# Patient Record
Sex: MALE | Race: BLACK OR AFRICAN AMERICAN | Employment: UNEMPLOYED | RURAL
[De-identification: names, ages, dates, MRNs, and addresses within clinical notes are randomized per-mention and may not be internally consistent; named-entity substitution may affect disease eponyms.]

---

## 2019-10-01 ENCOUNTER — OFFICE VISIT (OUTPATIENT)
Dept: INTERNAL MEDICINE CLINIC | Age: 49
End: 2019-10-01

## 2019-10-01 VITALS
TEMPERATURE: 98.2 F | HEIGHT: 71 IN | OXYGEN SATURATION: 98 % | DIASTOLIC BLOOD PRESSURE: 87 MMHG | SYSTOLIC BLOOD PRESSURE: 124 MMHG | RESPIRATION RATE: 16 BRPM | WEIGHT: 244 LBS | HEART RATE: 83 BPM | BODY MASS INDEX: 34.16 KG/M2

## 2019-10-01 DIAGNOSIS — I10 ESSENTIAL HYPERTENSION: Primary | ICD-10-CM

## 2019-10-01 DIAGNOSIS — F32.1 MODERATE MAJOR DEPRESSION (HCC): ICD-10-CM

## 2019-10-01 DIAGNOSIS — E78.00 HYPERCHOLESTEREMIA: ICD-10-CM

## 2019-10-01 DIAGNOSIS — M10.00 IDIOPATHIC GOUT, UNSPECIFIED CHRONICITY, UNSPECIFIED SITE: ICD-10-CM

## 2019-10-01 DIAGNOSIS — L20.82 FLEXURAL ECZEMA: ICD-10-CM

## 2019-10-01 PROBLEM — M10.9 GOUT: Status: ACTIVE | Noted: 2019-10-01

## 2019-10-01 PROBLEM — L30.9 ECZEMA: Status: ACTIVE | Noted: 2019-10-01

## 2019-10-01 RX ORDER — ALLOPURINOL 300 MG/1
300 TABLET ORAL DAILY
Qty: 30 TAB | Refills: 5 | Status: SHIPPED | OUTPATIENT
Start: 2019-10-01 | End: 2020-01-13 | Stop reason: SDUPTHER

## 2019-10-01 RX ORDER — SILDENAFIL 100 MG/1
100 TABLET, FILM COATED ORAL AS NEEDED
Qty: 3 TAB | Refills: 5 | Status: SHIPPED | OUTPATIENT
Start: 2019-10-01 | End: 2020-04-14 | Stop reason: SDUPTHER

## 2019-10-01 RX ORDER — ALLOPURINOL 300 MG/1
TABLET ORAL DAILY
COMMUNITY
End: 2019-10-01 | Stop reason: SDUPTHER

## 2019-10-01 RX ORDER — LISINOPRIL 10 MG/1
10 TABLET ORAL DAILY
Qty: 30 TAB | Refills: 5 | Status: SHIPPED | OUTPATIENT
Start: 2019-10-01 | End: 2020-03-03 | Stop reason: SDUPTHER

## 2019-10-01 RX ORDER — PRAVASTATIN SODIUM 40 MG/1
40 TABLET ORAL
Qty: 30 TAB | Refills: 5 | Status: SHIPPED | OUTPATIENT
Start: 2019-10-01 | End: 2020-03-03 | Stop reason: SDUPTHER

## 2019-10-01 RX ORDER — COLCHICINE 0.6 MG/1
0.6 TABLET ORAL DAILY
Qty: 30 TAB | Refills: 5 | Status: SHIPPED | OUTPATIENT
Start: 2019-10-01 | End: 2020-01-13 | Stop reason: SDUPTHER

## 2019-10-01 RX ORDER — TRIAMCINOLONE ACETONIDE 1 MG/G
CREAM TOPICAL 2 TIMES DAILY
Qty: 45 G | Refills: 2 | Status: SHIPPED | OUTPATIENT
Start: 2019-10-01 | End: 2020-03-10

## 2019-10-01 RX ORDER — ROSUVASTATIN CALCIUM 40 MG/1
40 TABLET, COATED ORAL
COMMUNITY
End: 2019-10-01 | Stop reason: SDUPTHER

## 2019-10-01 RX ORDER — TRIAMCINOLONE ACETONIDE 5 MG/G
CREAM TOPICAL 2 TIMES DAILY
COMMUNITY
End: 2019-10-01 | Stop reason: SDUPTHER

## 2019-10-01 RX ORDER — SERTRALINE HYDROCHLORIDE 50 MG/1
50 TABLET, FILM COATED ORAL DAILY
Qty: 30 TAB | Refills: 5 | Status: SHIPPED | OUTPATIENT
Start: 2019-10-01 | End: 2020-03-03 | Stop reason: SDUPTHER

## 2019-10-01 RX ORDER — LISINOPRIL 10 MG/1
TABLET ORAL DAILY
COMMUNITY
End: 2019-10-01 | Stop reason: SDUPTHER

## 2019-10-01 RX ORDER — COLCHICINE 0.6 MG/1
0.6 TABLET ORAL 2 TIMES DAILY
COMMUNITY
End: 2019-10-01 | Stop reason: SDUPTHER

## 2019-10-01 NOTE — PROGRESS NOTES
New patient to establish care - psychologist Dr Joe Leonardo [phone] - wants patient to start Zoloft and Wellbutrin SR - hypertension and cholesterol  Teto Dunn LPN  [id number]/0/9326  91:09 AM  128 Nubia Mitchell LPN  39/6/2283  18:64 AM

## 2019-10-01 NOTE — PROGRESS NOTES
Subjective:     Naveen Foley is a 53 y.o. male who presents for follow up of hypertension and hyperlipidemia. New concerns: seeing psych for depression and PTSD, Zahra Camacho. Never been on AD before. Patient is new to this clinic. Comes in to establish care. Previous care was with . Reason for the change is: medicaid ins.  C/o ED  Phq9=19    Current Outpatient Medications   Medication Sig Dispense Refill    allopurinol (ZYLOPRIM) 300 mg tablet Take  by mouth daily.  lisinopril (PRINIVIL, ZESTRIL) 10 mg tablet Take  by mouth daily.  colchicine (COLCRYS) 0.6 mg tablet Take 0.6 mg by mouth two (2) times a day.  rosuvastatin (CRESTOR) 40 mg tablet Take 40 mg by mouth nightly.  triamcinolone (ARISTOCORT) 0.5 % topical cream Apply  to affected area two (2) times a day. use thin layer       No Known Allergies    Diet and Lifestyle: smoker 0.5 pack per day    Cardiovascular ROS: taking medications as instructed, no medication side effects noted, no TIA's, no chest pain on exertion, no dyspnea on exertion, noting swelling of ankles. Hx gout bothers him occ    Review of Systems, additional:  Pertinent items are noted in HPI. Patient Active Problem List    Diagnosis Date Noted    Essential hypertension 10/01/2019    Hypercholesteremia 10/01/2019    Eczema 10/01/2019    Gout 10/01/2019    Moderate major depression ([address] 10/01/2019     Past Medical History:   Diagnosis Date    Depression     Hypercholesterolemia     Hypertension     Psychotic disorder ([facility] [address]      No past surgical history on file. No family history on file. Social History     Tobacco Use    Smoking status: Current Every Day Smoker     Packs/day: 0.50     Types: Cigarettes    Smokeless tobacco: Never Used   Substance Use Topics    Alcohol use: Not Currently     Frequency: Never                 Objective:     Physical exam significant for the following:      WNL    Visit Vitals  /87 (BP 1 Location: Right arm, BP Patient Position: At rest)   Pulse 83   Temp 98.2 °F (36.8 °C) (Oral)   Resp 16   Ht 5' 11\" (1.803 m)   Wt 244 lb (110.7 kg)   SpO2 98%   BMI 34.03 kg/m²     Appearance: alert, well appearing, and in no distress. General exam: CVS exam BP noted to be well controlled today in office, S1, S2 normal, no gallop, no murmur, chest clear, no JVD, no HSM, no edema. .   Assessment/Plan:     hypertension well controlled, stable, hyperlipidemia stable. ICD-10-CM ICD-9-CM    1. Essential hypertension Z40 237.6 METABOLIC PANEL, COMPREHENSIVE      CBC W/O DIFF   2. Hypercholesteremia E78.00 272.0 LIPID PANEL   3. Flexural eczema L20.82 691.8    4. Idiopathic gout, unspecified chronicity, unspecified site M10.00 274.9    5. Moderate major depression (HCC) F32.1 296.22 TSH 3RD GENERATION     Orders Placed This Encounter    LIPID PANEL     Standing Status:   Future     Standing Expiration Date:   4/2/2020    TSH 3RD GENERATION     Standing Status:   Future     Standing Expiration Date:   4/7/0295    METABOLIC PANEL, COMPREHENSIVE     Standing Status:   Future     Standing Expiration Date:   4/2/2020    CBC W/O DIFF     Standing Status:   Future     Standing Expiration Date:   4/2/2020    DISCONTD: allopurinol (ZYLOPRIM) 300 mg tablet     Sig: Take  by mouth daily.  DISCONTD: lisinopril (PRINIVIL, ZESTRIL) 10 mg tablet     Sig: Take  by mouth daily.  DISCONTD: colchicine (COLCRYS) 0.6 mg tablet     Sig: Take 0.6 mg by mouth two (2) times a day.  DISCONTD: rosuvastatin (CRESTOR) 40 mg tablet     Sig: Take 40 mg by mouth nightly.  DISCONTD: triamcinolone (ARISTOCORT) 0.5 % topical cream     Sig: Apply  to affected area two (2) times a day. use thin layer    sertraline (ZOLOFT) 50 mg tablet     Sig: Take 1 Tab by mouth daily. Start half tablet daily, usually in the evening, every few days increase as tolerated,mood.      Dispense:  30 Tab     Refill:  5    triamcinolone acetonide (KENALOG) 0.1 % topical cream     Sig: Apply  to affected area two (2) times a day. use thin layer     Dispense:  45 g     Refill:  2    colchicine (COLCRYS) 0.6 mg tablet     Sig: Take 1 Tab by mouth daily. Dispense:  30 Tab     Refill:  5    allopurinol (ZYLOPRIM) 300 mg tablet     Sig: Take 1 Tab by mouth daily. Dispense:  30 Tab     Refill:  5    lisinopril (PRINIVIL, ZESTRIL) 10 mg tablet     Sig: Take 1 Tab by mouth daily. Dispense:  30 Tab     Refill:  5    pravastatin (PRAVACHOL) 40 mg tablet     Sig: Take 1 Tab by mouth nightly. Dispense:  30 Tab     Refill:  5    sildenafil citrate (VIAGRA) 100 mg tablet     Sig: Take 1 Tab by mouth as needed (sex). Dispense:  3 Tab     Refill:  5     Discussed possible side affects, precautions, and drug interactions and possible benefits of the medication(s). See patient instructions, went over them personally with the patient. Emphasized compliance. Questions answered. Patient states that they understand the plan of action and will call if there are any issues or misunderstandings. Follow-up and Dispositions    · Return in about 6 weeks (around 11/12/2019) for routine follow up.

## 2019-10-29 ENCOUNTER — TELEPHONE (OUTPATIENT)
Dept: INTERNAL MEDICINE CLINIC | Age: 49
End: 2019-10-29

## 2019-10-29 NOTE — TELEPHONE ENCOUNTER
10/29/19 Attempted PA  For Colchicine 0.6 mg tablet with PA rep Purnima FUENTES.she state tablets not on formulary,capsules on formulary without PA. [facility] pharmacy called spoke with Mirta Bunch state he will convert to capsules. Please note Thanks

## 2019-11-22 ENCOUNTER — OFFICE VISIT (OUTPATIENT)
Dept: INTERNAL MEDICINE CLINIC | Age: 49
End: 2019-11-22

## 2019-11-22 VITALS
RESPIRATION RATE: 16 BRPM | HEIGHT: 71 IN | OXYGEN SATURATION: 97 % | HEART RATE: 94 BPM | BODY MASS INDEX: 33.88 KG/M2 | DIASTOLIC BLOOD PRESSURE: 82 MMHG | SYSTOLIC BLOOD PRESSURE: 122 MMHG | WEIGHT: 242 LBS | TEMPERATURE: 98.2 F

## 2019-11-22 DIAGNOSIS — R07.9 CHEST PAIN, UNSPECIFIED TYPE: ICD-10-CM

## 2019-11-22 DIAGNOSIS — I10 ESSENTIAL HYPERTENSION: ICD-10-CM

## 2019-11-22 DIAGNOSIS — M10.00 IDIOPATHIC GOUT, UNSPECIFIED CHRONICITY, UNSPECIFIED SITE: ICD-10-CM

## 2019-11-22 DIAGNOSIS — F32.1 MODERATE MAJOR DEPRESSION (HCC): Primary | ICD-10-CM

## 2019-11-22 RX ORDER — BUPROPION HYDROCHLORIDE 100 MG/1
100 TABLET ORAL 3 TIMES DAILY
Qty: 90 TAB | Refills: 2 | Status: SHIPPED | OUTPATIENT
Start: 2019-11-22 | End: 2020-02-10

## 2019-11-22 RX ORDER — GUAIFENESIN 100 MG/5ML
81 LIQUID (ML) ORAL DAILY
COMMUNITY
End: 2022-10-20 | Stop reason: ALTCHOICE

## 2019-11-22 RX ORDER — NAPROXEN 500 MG/1
500 TABLET ORAL 2 TIMES DAILY WITH MEALS
Qty: 60 TAB | Refills: 0 | Status: SHIPPED | OUTPATIENT
Start: 2019-11-22 | End: 2019-12-12 | Stop reason: ALTCHOICE

## 2019-11-22 RX ORDER — ZINC GLUCONATE 10 MG
LOZENGE ORAL
COMMUNITY

## 2019-11-22 NOTE — LETTER
[facility] introduces Touch[employer] patient portal. Now you can access parts of your medical record, email your doctor's office, and request medication refills online. 1. In your internet browser, go to www."[employer]" 
2. Click on the First Time User? Click Here link in the Sign In box. You will see the New Member Sign Up page. 3. Enter your Touch[employer] Access Code exactly as it appears below. You will not need to use this code after youve completed the sign-up process. If you do not sign up before the expiration date, you must request a new code. · TouchTen Access Code:[email or web address] · Expires: 1/6/2020 10:46 AM 
 
4. Enter the last four digits of your Social Security Number (xxxx) and Date of Birth (mm/dd/yyyy) as indicated and click Submit. You will be taken to the next sign-up page. 5. Create a TouchTen ID. This will be your TouchTen login ID and cannot be changed, so think of one that is secure and easy to remember. 6. Create a Touch[employer] password. You can change your password at any time. 7. Enter your Password Reset Question and Answer. This can be used at a later time if you forget your password. 8. Enter your e-mail address. You will receive e-mail notification when new information is available in [address] 9. Click Sign Up. You can now view and download portions of your medical record. 10. Click the Download Summary menu link to download a portable copy of your medical information. If you have questions, please visit the Frequently Asked Questions section of the Touch[employer] website. Remember, TouchTen is NOT to be used for urgent needs. For medical emergencies, dial 911. Now available from your iPhone and Android!

## 2019-11-22 NOTE — PROGRESS NOTES
HISTORY OF PRESENT ILLNESS  Harry Cameron is a 51 y.o. male. Panic Attack   The history is provided by the patient. This is a new problem. Episode onset: 2 mo. The problem occurs daily. The problem has been gradually worsening (lately). Associated symptoms include chest pain and shortness of breath. Associated symptoms comments: Numbness left arm and palpitations, last 10 to 15 min. The symptoms are relieved by relaxation. Treatments tried: went to ER with tingling. Agree with comments, see chief complaint. He wants to do some cardiac testing to make sure that that is not the problem. His ER physician thinks it is panic attacks. Aside from having panic attacks, he has been feeling pretty depressed. See PHQ 9  Labs from today were reviewed  no, labs done previously were reviewed  yes, Labs done in ER were reviewed  yes, Additional labs are ordered  yes,      Current Outpatient Medications   Medication Sig Dispense Refill    magnesium 250 mg tab Take  by mouth.  aspirin 81 mg chewable tablet Take 81 mg by mouth daily.  sertraline (ZOLOFT) 50 mg tablet Take 1 Tab by mouth daily. Start half tablet daily, usually in the evening, every few days increase as tolerated,mood. 30 Tab 5    triamcinolone acetonide (KENALOG) 0.1 % topical cream Apply  to affected area two (2) times a day. use thin layer 45 g 2    colchicine (COLCRYS) 0.6 mg tablet Take 1 Tab by mouth daily. 30 Tab 5    allopurinol (ZYLOPRIM) 300 mg tablet Take 1 Tab by mouth daily. 30 Tab 5    lisinopril (PRINIVIL, ZESTRIL) 10 mg tablet Take 1 Tab by mouth daily. 30 Tab 5    pravastatin (PRAVACHOL) 40 mg tablet Take 1 Tab by mouth nightly. 30 Tab 5    sildenafil citrate (VIAGRA) 100 mg tablet Take 1 Tab by mouth as needed (sex). 3 Tab 5       Review of Systems   Respiratory: Positive for shortness of breath. Cardiovascular: Positive for chest pain. Musculoskeletal: Positive for joint pain.    Psychiatric/Behavioral: Positive for depression and suicidal ideas.      Social History     Socioeconomic History    Marital status:      Spouse name: Not on file    Number of children: Not on file    Years of education: Not on file    Highest education level: Not on file   Occupational History    Occupation: unemployed   Social Needs    Financial resource strain: Not on file    Food insecurity:     Worry: Not on file     Inability: Not on file   [employer] needs:     Medical: Not on file     Non-medical: Not on file   Tobacco Use    Smoking status: Current Every Day Smoker     Packs/day: 1.00     Types: Cigarettes    Smokeless tobacco: Never Used   Substance and Sexual Activity    Alcohol use: Not Currently     Alcohol/week: 1.0 standard drinks     Types: 1 Cans of beer per week     Frequency: Never     Comment: occ    Drug use: Never    Sexual activity: Yes     Partners: Female   Lifestyle    Physical activity:     Days per week: Not on file     Minutes per session: Not on file    Stress: Not on file   Relationships    Social connections:     Talks on phone: Not on file     Gets together: Not on file     Attends Taoist service: Not on file     Active member of club or organization: Not on file     Attends meetings of clubs or organizations: Not on file     Relationship status: Not on file    Intimate partner violence:     Fear of current or ex partner: Not on file     Emotionally abused: Not on file     Physically abused: Not on file     Forced sexual activity: Not on file   Other Topics Concern    Not on file   Social History Narrative    Not on file         Physical Exam  Visit Vitals  /82 (BP 1 Location: Left arm, BP Patient Position: Sitting)   Pulse 94   Temp 98.2 °F (36.8 °C) (Oral)   Resp 16   Ht 5' 11\" (1.803 m)   Wt 242 lb (109.8 kg)   SpO2 97%   BMI 33.75 kg/m²     WD WN male NAD  Heart RRR without murmers clicks or rubs  Lungs CTA  Abdo soft nontender  Ext no edema    ASSESSMENT and PLAN  Encounter Diagnoses Name Primary?  Moderate major depression (HCC) Yes    Chest pain, unspecified type     Idiopathic gout, unspecified chronicity, unspecified site     Essential hypertension      Orders Placed This Encounter    AMB SUPPLY ORDER    magnesium 250 mg tab    aspirin 81 mg chewable tablet    buPROPion (WELLBUTRIN) 100 mg tablet    naproxen (NAPROSYN) 500 mg tablet     Discussed possible side affects, precautions, and drug interactions and possible benefits of the medication(s). The patient was counseled on the dangers of tobacco use, and was advised to quit. Reviewed strategies to maximize success, including pharmacotherapy (wellbutrin). We will see if Wellbutrin can help also send in the counseling. Stress test ordered. Hypertension stable  Follow-up and Dispositions    · Return in about 6 weeks (around 1/3/2020) for routine follow up.

## 2019-11-22 NOTE — PROGRESS NOTES
Chief Complaint   Patient presents with    Panic Attack     [facility] - ER x1 week follow up    tingling and numbness L/arm down to fingers and L/side of chest numb     I have reviewed the patient's medical history in detail and updated the computerized patient record. Health Maintenance reviewed. 1. Have you been to the ER, urgent care clinic since your last visit? Hospitalized since your last visit? yes    2. Have you seen or consulted any other health care providers outside of the [address] [place] [address] since your last visit? Include any pap smears or colon screening. [facility] ER      Encouraged pt to discuss pt's wishes with spouse/partner/family and bring them in the next appt to follow thru with the Advanced Directive    Fall Risk Assessment, last 12 mths 11/22/2019   Able to walk? Yes   Fall in past 12 months? No       3 most recent PHQ Screens 11/22/2019   Little interest or pleasure in doing things Nearly every day   Feeling down, depressed, irritable, or hopeless Nearly every day   Total Score PHQ 2 6   Trouble falling or staying asleep, or sleeping too much -   Feeling tired or having little energy -   Poor appetite, weight loss, or overeating -   Feeling bad about yourself - or that you are a failure or have let yourself or your family down -   Trouble concentrating on things such as school, work, reading, or watching TV -   Moving or speaking so slowly that other people could have noticed; or the opposite being so fidgety that others notice -   Thoughts of being better off dead, or hurting yourself in some way -   PHQ 9 Score -   How difficult have these problems made it for you to do your work, take care of your home and get along with others -       Abuse Screening Questionnaire 11/22/2019   Do you ever feel afraid of your partner? N   Are you in a relationship with someone who physically or mentally threatens you? N   Is it safe for you to go home?  Y       ADL Assessment 11/22/2019   Feeding yourself No Help Needed   Getting from bed to chair No Help Needed   Getting dressed No Help Needed   Bathing or showering No Help Needed   Walk across the room (includes cane/walker) No Help Needed   Using the telphone No Help Needed   Taking your medications No Help Needed   Preparing meals No Help Needed   Managing money (expenses/bills) No Help Needed   Moderately strenuous housework (laundry) No Help Needed   Shopping for personal items (toiletries/medicines) No Help Needed   Shopping for groceries No Help Needed   Driving No Help Needed   Climbing a flight of stairs No Help Needed   Getting to places beyond walking distances No Help Needed

## 2019-11-22 NOTE — PATIENT INSTRUCTIONS
Recovering From Depression: Care Instructions  Your Care Instructions    Taking good care of yourself is important as you recover from depression. In time, your symptoms will fade as your treatment takes hold. Do not give up. Instead, focus your energy on getting better. Your mood will improve. It just takes some time. Focus on things that can help you feel better, such as being with friends and family, eating well, and getting enough rest. But take things slowly. Do not do too much too soon. You will begin to feel better gradually. Follow-up care is a key part of your treatment and safety. Be sure to make and go to all appointments, and call your doctor if you are having problems. It's also a good idea to know your test results and keep a list of the medicines you take. How can you care for yourself at home? Be realistic  · If you have a large task to do, break it up into smaller steps you can handle, and just do what you can. · You may want to put off important decisions until your depression has lifted. If you have plans that will have a major impact on your life, such as marriage, divorce, or a job change, try to wait a bit. Talk it over with friends and loved ones who can help you look at the overall picture first.  · Reaching out to people for help is important. Do not isolate yourself. Let your family and friends help you. Find someone you can trust and confide in, and talk to that person. · Be patient, and be kind to yourself. Remember that depression is not your fault and is not something you can overcome with willpower alone. Treatment is necessary for depression, just like for any other illness. Feeling better takes time, and your mood will improve little by little. Stay active  · Stay busy and get outside. Take a walk, or try some other light exercise. · Talk with your doctor about an exercise program. Exercise can help with mild depression. · Go to a movie or concert.  Take part in a Sabian[employer] activity or other social gathering. Go to a "[employer] game. · Ask a friend to have dinner with you. Take care of yourself  · Eat a balanced diet with plenty of fresh fruits and vegetables, whole grains, and lean protein. If you have lost your appetite, eat small snacks rather than large meals. · Avoid drinking alcohol or using illegal drugs. Do not take medicines that have not been prescribed for you. They may interfere with medicines you may be taking for depression, or they may make your depression worse. · Take your medicines exactly as they are prescribed. You may start to feel better within 1 to 3 weeks of taking antidepressant medicine. But it can take as many as 6 to 8 weeks to see more improvement. If you have questions or concerns about your medicines, or if you do not notice any improvement by 3 weeks, talk to your doctor. · If you have any side effects from your medicine, tell your doctor. Antidepressants can make you feel tired, dizzy, or nervous. Some people have dry mouth, constipation, headaches, sexual problems, or diarrhea. Many of these side effects are mild and will go away on their own after you have been taking the medicine for a few weeks. Some may last longer. Talk to your doctor if side effects are bothering you too much. You might be able to try a different medicine. · Get enough sleep. If you have problems sleeping:  ? Go to bed at the same time every night, and get up at the same time every morning. ? Keep your bedroom dark and quiet. ? Do not exercise after 5:00 p.m.  ? Avoid drinks with caffeine after 5:00 p.m. · Avoid sleeping pills unless they are prescribed by the doctor treating your depression. Sleeping pills may make you groggy during the day, and they may interact with other medicine you are taking. · If you have any other illnesses, such as diabetes, heart disease, or high blood pressure, make sure to continue with your treatment.  Tell your doctor about all of the medicines you take, including those with or without a prescription. · Keep the numbers for these national suicide hotlines: 8-192-686-TALK (8-568.567.9445) and 6-682-TSOPHEP (3-225.245.4978). If you or someone you know talks about suicide or feeling hopeless, get help right away. When should you call for help? Call 911 anytime you think you may need emergency care. For example, call if:    · You feel like hurting yourself or someone else.     · Someone you know has depression and is about to attempt or is attempting suicide.   [facility] your doctor now or seek immediate medical care if:    · You hear voices.     · Someone you know has depression and:  ? Starts to give away his or her possessions. ? Uses illegal drugs or drinks alcohol heavily. ? Talks or writes about death, including writing suicide notes or talking about guns, knives, or pills. ? Starts to spend a lot of time alone. ? Acts very aggressively or suddenly appears calm.    Watch closely for changes in your health, and be sure to contact your doctor if:    · You do not get better as expected. Where can you learn more? Go to http://octavio-moraima.info/. Enter Y121 in the search box to learn more about \"Recovering From Depression: Care Instructions. \"  Current as of: May 28, 2019  Content Version: 12.2  © 8632-2422 [employer]. Care instructions adapted under license by [employer] (which disclaims liability or warranty for this information). If you have questions about a medical condition or this instruction, always ask your healthcare professional. [place] [address] any warranty or liability for your use of this information. Depression Treatment: Care Instructions  Your Care Instructions    Depression is a condition that affects the way you feel, think, and act.  It causes symptoms such as low energy, loss of interest in daily activities, and sadness or grouchiness that goes on for a long time. Depression is very common and affects men and women of all ages. Depression is a medical illness caused by changes in the natural chemicals in your brain. It is not a character flaw, and it does not mean that you are a bad or weak person. It does not mean that you are going crazy. It is important to know that depression can be treated. Medicines, counseling, and self-care can all help. Many people do not get help because they are embarrassed or think that they will get over the depression on their own. But some people do not get better without treatment. Follow-up care is a key part of your treatment and safety. Be sure to make and go to all appointments, and call your doctor if you are having problems. It's also a good idea to know your test results and keep a list of the medicines you take. How can you care for yourself at home? Learn about antidepressant medicines  Antidepressant medicines can improve or end the symptoms of depression. You may need to take the medicine for at least 6 months, and often longer. Keep taking your medicine even if you feel better. If you stop taking it too soon, your symptoms may come back or get worse. You may start to feel better within 1 to 3 weeks of taking antidepressant medicine. But it can take as many as 6 to 8 weeks to see more improvement. Talk to your doctor if you have problems with your medicine or if you do not notice any improvement after 3 weeks. Antidepressants can make you feel tired, dizzy, or nervous. Some people have dry mouth, constipation, headaches, sexual problems, an upset stomach, or diarrhea. Many of these side effects are mild and go away on their own after you take the medicine for a few weeks. Some may last longer. Talk to your doctor if side effects bother you too much. You might be able to try a different medicine. If you are pregnant or breastfeeding, talk to your doctor about what medicines you can take.   Learn about counseling  In many cases, counseling can work as well as medicines to treat mild to moderate depression. Counseling is done by licensed mental health providers, such as psychologists, social workers, and some types of nurses. It can be done in one-on-one sessions or in a group setting. Many people find group sessions helpful. Cognitive-behavioral therapy is a type of counseling. In this treatment therapy, you learn how to see and change unhelpful thinking styles that may be adding to your depression. Counseling and medicines often work well when used together. To manage depression  · Be physically active. Getting 30 minutes of exercise each day is good for your body and your mind. Begin slowly if it is hard for you to get started. If you already exercise, keep it up. · Plan something pleasant for yourself every day. Include activities that you have enjoyed in the past.  · Get enough sleep. Talk to your doctor if you have problems sleeping. · Eat a balanced diet. If you do not feel hungry, eat small snacks rather than large meals. · Do not drink alcohol, use illegal drugs, or take medicines that your doctor has not prescribed for you. They may interfere with your treatment. · Spend time with family and friends. It may help to speak openly about your depression with people you trust.  · Take your medicines exactly as prescribed. Call your doctor if you think you are having a problem with your medicine. · Do not make major life decisions while you are depressed. Depression may change the way you think. You will be able to make better decisions after you feel better. · Think positively. Challenge negative thoughts with statements such as \"I am hopeful\"; \"Things will get better\"; and \"I can ask for the help I need. \" Write down these statements and read them often, even if you don't believe them yet. · Be patient with yourself. It took time for your depression to develop, and it will take time for your symptoms to improve.  Do not take on too much or be too hard on yourself. · Learn all you can about depression from written and online materials. · Check out behavioral health classes to learn more about dealing with depression. · Keep the numbers for these national suicide hotlines: 3-151-407-TALK (8-647.724.4338) and 6-586-RRNWEMJ (2-351.583.8425). If you or someone you know talks about suicide or feeling hopeless, get help right away. When should you call for help? Call 911 anytime you think you may need emergency care. For example, call if:    · You feel you cannot stop from hurting yourself or someone else.   [facility] your doctor now or seek immediate medical care if:    · You hear voices.     · You feel much more depressed.    Watch closely for changes in your health, and be sure to contact your doctor if:    · You are having problems with your depression medicine.     · You are not getting better as expected. Where can you learn more? Go to http://octavio-moraima.info/. Enter D616 in the search box to learn more about \"Depression Treatment: Care Instructions. \"  Current as of: May 28, 2019  Content Version: 12.2  © 9207-1343 [employer]. Care instructions adapted under license by [employer] (which disclaims liability or warranty for this information). If you have questions about a medical condition or this instruction, always ask your healthcare professional. [place] [address] any warranty or liability for your use of this information. Suicidal Thoughts in a Family Member: Care Instructions  Your Care Instructions  Most people who think about suicide don't want to die. They think suicide will solve their problems and end their pain. People who consider suicide often feel hopeless, helpless, and worthless. These ideas can make a person feel that there is no other choice. If a person talks about suicide or about wanting to die or disappear, take him or her seriously. Do this even if the person says it in a joking way. If you feel that a family member may be thinking about suicide, don't be afraid to talk to him or her about it. After you know what the person is thinking, you may be able to help. Follow-up care is a key part of your family member's treatment and safety. Be sure to make and go to all appointments, and call your doctor if your family member is having problems. How can you care for your loved one at home? · Encourage your loved one not to drink alcohol. Tell your loved one's doctor if he or she needs help to quit. Counseling, support groups, and sometimes medicines can help your loved one stay sober. · Ask your loved one not to take any medicines unless his or her doctor says to take it. · Talk to the person often so you know how he or she feels. · Encourage the person to go to counseling. You could offer your help for getting to and from the sessions. You can even offer to go to the sessions if that will make him or her more likely to go. · Talk to other family members. Make a schedule so that someone is always with the person who is thinking about suicide. · Put away sharp or dangerous objects. Make sure there are no guns in the house. Also remove medicines that are not being used. · Keep the numbers for these national suicide hotlines: 2-665-954-TALK (6-775.170.9127) and 3-081-GBZWMWA (4-673.675.6846). · Check in with your family member often. Find out if he or she has made a plan for suicide or has figured out how to carry out a plan. If a person has a plan for suicide and a way to carry out that plan, follow these steps:  · Make sure you are safe. · Stay with the person (or ask someone you trust to stay with the person) until the crisis has passed. · Encourage the person to seek professional help. · Do not argue with the person (\"It is not as bad as you think\"). And don't challenge the person (\"You are not the type to attempt suicide\").   · Show understanding and compassion. Tell the person that you do not want him or her to die (or to harm another person). Talk about the situation as openly as you can. · Call 143 (or the police, if 411 is not available) to stop the person from carrying out the threat. When should you call for help? Call 911 anytime you think your loved one may need emergency care. For example, call if:    · Someone you know is about to attempt or is attempting suicide.     · Your family member feels that he or she cannot stop from hurting himself or herself or someone else.   [facility] the doctor now or seek immediate medical care if:    · Your family member has one or more warning signs of suicide. For example, call if the person:  ? Starts to give away his or her possessions. ? Uses illegal drugs or drinks alcohol heavily. ? Talks or writes about death. This may include writing suicide notes and talking about guns, knives, or pills. ? Starts to spend a lot of time alone or spends more time alone than usual.  ? Acts very aggressively or suddenly appears calm.     · Your family member hears voices.     · Your family member seems more depressed than usual.    Watch closely for changes in your family member's health, and be sure to contact the doctor if you have any questions. Where can you learn more? Go to http://octavio-moraima.info/. Enter F411 in the search box to learn more about \"Suicidal Thoughts in a Family Member: Care Instructions. \"  Current as of: May 28, 2019  Content Version: 12.2  © 2859-1094 [employer]. Care instructions adapted under license by [employer] (which disclaims liability or warranty for this information). If you have questions about a medical condition or this instruction, always ask your healthcare professional. [place] [address] any warranty or liability for your use of this information.

## 2019-12-12 ENCOUNTER — OFFICE VISIT (OUTPATIENT)
Dept: INTERNAL MEDICINE CLINIC | Age: 49
End: 2019-12-12

## 2019-12-12 VITALS
TEMPERATURE: 97.1 F | RESPIRATION RATE: 16 BRPM | SYSTOLIC BLOOD PRESSURE: 122 MMHG | DIASTOLIC BLOOD PRESSURE: 85 MMHG | BODY MASS INDEX: 32.48 KG/M2 | HEART RATE: 86 BPM | HEIGHT: 71 IN | WEIGHT: 232 LBS

## 2019-12-12 DIAGNOSIS — F32.1 MODERATE MAJOR DEPRESSION (HCC): ICD-10-CM

## 2019-12-12 DIAGNOSIS — I10 ESSENTIAL HYPERTENSION: ICD-10-CM

## 2019-12-12 DIAGNOSIS — R10.30 LOWER ABDOMINAL PAIN: ICD-10-CM

## 2019-12-12 NOTE — PROGRESS NOTES
HISTORY OF PRESENT ILLNESS  Babar Naidu is a 51 y.o. male. Abdominal Pain   The history is provided by the patient. This is a new problem. Episode onset: 4 days. The problem occurs hourly. The problem has been gradually improving. Associated symptoms include abdominal pain. Pertinent negatives include no chest pain. Treatments tried: went to ER did CT scan Rx naprosyn. The treatment provided mild relief. Ct showed a small nodule < 1 cm which requires F/u. Did not show a cause of his abdo pain which is imporoving. Mood good on zoloft and quit smoking with wellbutrin he cont. Current Outpatient Medications   Medication Sig Dispense Refill    magnesium 250 mg tab Take  by mouth.  aspirin 81 mg chewable tablet Take 81 mg by mouth daily.  buPROPion (WELLBUTRIN) 100 mg tablet Take 1 Tab by mouth three (3) times daily. Start 1 daily increase as tolerated 90 Tab 2    sertraline (ZOLOFT) 50 mg tablet Take 1 Tab by mouth daily. Start half tablet daily, usually in the evening, every few days increase as tolerated,mood. 30 Tab 5    triamcinolone acetonide (KENALOG) 0.1 % topical cream Apply  to affected area two (2) times a day. use thin layer 45 g 2    colchicine (COLCRYS) 0.6 mg tablet Take 1 Tab by mouth daily. 30 Tab 5    allopurinol (ZYLOPRIM) 300 mg tablet Take 1 Tab by mouth daily. 30 Tab 5    lisinopril (PRINIVIL, ZESTRIL) 10 mg tablet Take 1 Tab by mouth daily. 30 Tab 5    pravastatin (PRAVACHOL) 40 mg tablet Take 1 Tab by mouth nightly. 30 Tab 5    sildenafil citrate (VIAGRA) 100 mg tablet Take 1 Tab by mouth as needed (sex). 3 Tab 5       Patient Active Problem List   Diagnosis Code    Essential hypertension I10    Hypercholesteremia E78.00    Eczema L30.9    Gout M10.9    Moderate major depression ([address] F32.1     No past surgical history on file. Review of Systems   Constitutional: Negative for fever and weight loss. Respiratory: Negative for cough.     Cardiovascular: Negative for chest pain. Gastrointestinal: Positive for abdominal pain. Negative for blood in stool, constipation, diarrhea, nausea and vomiting.      Social History     Socioeconomic History    Marital status:      Spouse name: Not on file    Number of children: Not on file    Years of education: Not on file    Highest education level: Not on file   Occupational History    Occupation: unemployed   Social Needs    Financial resource strain: Not on file    Food insecurity:     Worry: Not on file     Inability: Not on file   [employer] needs:     Medical: Not on file     Non-medical: Not on file   Tobacco Use    Smoking status: Former Smoker     Packs/day: 1.00     Types: Cigarettes     Last attempt to quit: 2019     Years since quittin.0    Smokeless tobacco: Never Used   Substance and Sexual Activity    Alcohol use: Not Currently     Alcohol/week: 1.0 standard drinks     Types: 1 Cans of beer per week     Frequency: Never     Comment: occ    Drug use: Never    Sexual activity: Yes     Partners: Female   Lifestyle    Physical activity:     Days per week: Not on file     Minutes per session: Not on file    Stress: Not on file   Relationships    Social connections:     Talks on phone: Not on file     Gets together: Not on file     Attends Spiritism service: Not on file     Active member of club or organization: Not on file     Attends meetings of clubs or organizations: Not on file     Relationship status: Not on file    Intimate partner violence:     Fear of current or ex partner: Not on file     Emotionally abused: Not on file     Physically abused: Not on file     Forced sexual activity: Not on file   Other Topics Concern    Not on file   Social History Narrative    Not on file       Physical Exam  Visit Vitals  /85 (BP 1 Location: Left arm, BP Patient Position: Sitting)   Pulse 86   Temp 97.1 °F (36.2 °C) (Oral)   Resp 16   Ht 5' 11\" (1.803 m)   Wt 232 lb (105.2 kg)   BMI 32.36 kg/m²     WD WN male NAD  Heart RRR without murmers clicks or rubs  Lungs CTA  Abdo soft nontender  Ext no edema  Labs from today were reviewed  no, labs done previously were reviewed  no, Labs done in ER were reviewed  yes, Additional labs are ordered  yes,      ASSESSMENT and PLAN  Encounter Diagnoses   Name Primary?  Lung nodule < 6cm on CT Yes    Lower abdominal pain     Moderate major depression (HCC)     Essential hypertension      Repeat Ct in 3 mo, we discussed lung nodules and there f/u incidentaloma  Abdo precautions given    Orders Placed This Encounter    CT CHEST WO CONT     Follow-up and Dispositions    · Return in about 2 weeks (around 12/26/2019).

## 2020-01-03 ENCOUNTER — OFFICE VISIT (OUTPATIENT)
Dept: INTERNAL MEDICINE CLINIC | Age: 50
End: 2020-01-03

## 2020-01-03 VITALS
SYSTOLIC BLOOD PRESSURE: 124 MMHG | WEIGHT: 234 LBS | RESPIRATION RATE: 16 BRPM | DIASTOLIC BLOOD PRESSURE: 83 MMHG | OXYGEN SATURATION: 96 % | BODY MASS INDEX: 32.76 KG/M2 | HEIGHT: 71 IN | TEMPERATURE: 98.1 F | HEART RATE: 89 BPM

## 2020-01-03 DIAGNOSIS — R73.9 ELEVATED BLOOD SUGAR: ICD-10-CM

## 2020-01-03 DIAGNOSIS — I10 ESSENTIAL HYPERTENSION: ICD-10-CM

## 2020-01-03 DIAGNOSIS — R79.89 ELEVATED LIVER FUNCTION TESTS: ICD-10-CM

## 2020-01-03 DIAGNOSIS — R25.2 CRAMP IN LIMB: ICD-10-CM

## 2020-01-03 DIAGNOSIS — F32.1 MODERATE MAJOR DEPRESSION (HCC): Primary | ICD-10-CM

## 2020-01-03 DIAGNOSIS — R91.1 LUNG NODULE, SOLITARY: ICD-10-CM

## 2020-01-03 DIAGNOSIS — E78.00 HYPERCHOLESTEREMIA: ICD-10-CM

## 2020-01-03 DIAGNOSIS — I73.9 PERIPHERAL VASCULAR DISEASE (HCC): ICD-10-CM

## 2020-01-03 DIAGNOSIS — M10.00 IDIOPATHIC GOUT, UNSPECIFIED CHRONICITY, UNSPECIFIED SITE: ICD-10-CM

## 2020-01-03 NOTE — PROGRESS NOTES
Chief Complaint   Patient presents with    Results     stress test    Leg Pain     x 1 week cramping to outside of R/calf     I have reviewed the patient's medical history in detail and updated the computerized patient record. Health Maintenance reviewed. 1. Have you been to the ER, urgent care clinic since your last visit? Hospitalized since your last visit?no    2. Have you seen or consulted any other health care providers outside of the [address] [place] [address] since your last visit? Include any pap smears or colon screening. No      Encouraged pt to discuss pt's wishes with spouse/partner/family and bring them in the next appt to follow thru with the Advanced Directive    Fall Risk Assessment, last 12 mths 1/3/2020   Able to walk? Yes   Fall in past 12 months? No       3 most recent PHQ Screens 1/3/2020   Little interest or pleasure in doing things Several days   Feeling down, depressed, irritable, or hopeless Several days   Total Score PHQ 2 2   Trouble falling or staying asleep, or sleeping too much -   Feeling tired or having little energy -   Poor appetite, weight loss, or overeating -   Feeling bad about yourself - or that you are a failure or have let yourself or your family down -   Trouble concentrating on things such as school, work, reading, or watching TV -   Moving or speaking so slowly that other people could have noticed; or the opposite being so fidgety that others notice -   Thoughts of being better off dead, or hurting yourself in some way -   PHQ 9 Score -   How difficult have these problems made it for you to do your work, take care of your home and get along with others -       Abuse Screening Questionnaire 1/3/2020   Do you ever feel afraid of your partner? N   Are you in a relationship with someone who physically or mentally threatens you? N   Is it safe for you to go home?  Y       ADL Assessment 1/3/2020   Feeding yourself No Help Needed   Getting from bed to chair No Help Needed   Getting dressed No Help Needed   Bathing or showering No Help Needed   Walk across the room (includes cane/walker) No Help Needed   Using the telphone No Help Needed   Taking your medications No Help Needed   Preparing meals No Help Needed   Managing money (expenses/bills) No Help Needed   Moderately strenuous housework (laundry) No Help Needed   Shopping for personal items (toiletries/medicines) No Help Needed   Shopping for groceries No Help Needed   Driving No Help Needed   Climbing a flight of stairs No Help Needed   Getting to places beyond walking distances No Help Needed

## 2020-01-03 NOTE — PROGRESS NOTES
PROGRESS NOTE        SUBJECTIVE:  Diagnosis/Chief Complaint: Results (stress test) and Leg Pain (x 1 week cramping to outside of R/calf)  Here with wife, still depressed maybe a little better. 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 CT chest in March, still smoking but dec. Patient Active Problem List    Diagnosis Date Noted    Peripheral vascular disease ([facility] 01/03/2020    Essential hypertension 10/01/2019    Hypercholesteremia 10/01/2019    Eczema 10/01/2019    Gout 10/01/2019    Moderate major depression ([facility] 10/01/2019     Current Outpatient Medications   Medication Sig Dispense Refill    magnesium 250 mg tab Take  by mouth.  aspirin 81 mg chewable tablet Take 81 mg by mouth daily.  buPROPion (WELLBUTRIN) 100 mg tablet Take 1 Tab by mouth three (3) times daily. Start 1 daily increase as tolerated 90 Tab 2    sertraline (ZOLOFT) 50 mg tablet Take 1 Tab by mouth daily. Start half tablet daily, usually in the evening, every few days increase as tolerated,mood. 30 Tab 5    triamcinolone acetonide (KENALOG) 0.1 % topical cream Apply  to affected area two (2) times a day. use thin layer 45 g 2    colchicine (COLCRYS) 0.6 mg tablet Take 1 Tab by mouth daily. 30 Tab 5    allopurinol (ZYLOPRIM) 300 mg tablet Take 1 Tab by mouth daily. 30 Tab 5    lisinopril (PRINIVIL, ZESTRIL) 10 mg tablet Take 1 Tab by mouth daily. 30 Tab 5    pravastatin (PRAVACHOL) 40 mg tablet Take 1 Tab by mouth nightly. 30 Tab 5    sildenafil citrate (VIAGRA) 100 mg tablet Take 1 Tab by mouth as needed (sex).  3 Tab 5     No Known Allergies  Past Medical History:   Diagnosis Date    Depression     Hypercholesterolemia     Hypertension     Psychotic disorder ([facility] Social History     Tobacco Use    Smoking status: Former Smoker     Packs/day: 1.00     Types: Cigarettes     Last attempt to quit: 2019     Years since quittin.0    Smokeless tobacco: Never Used   Substance Use Topics    Alcohol use: Not Currently     Alcohol/week: 1.0 standard drinks     Types: 1 Cans of beer per week     Frequency: Never     Comment: occ        OBJECTIVE:    .  Visit Vitals  /83 (BP 1 Location: Left arm, BP Patient Position: Sitting)   Pulse 89   Temp 98.1 °F (36.7 °C) (Oral)   Resp 16   Ht 5' 11\" (1.803 m)   Wt 234 lb (106.1 kg)   SpO2 96%   BMI 32.64 kg/m²     WDWN in NAD  Heart RRR, no:C/M/R  Lungs CTA No wheezes, rales or rhonchi  Abdo: soft no tenderness, rebound or guarding  Neurological exam[de-identified] 2-12 intact  Psychiatric: Normal mood, judgement  Ext look nl    Reviewed: Medications, allergies, clinical lab test results and imaging results have been reviewed. Any abnormal findings have been addressed. ASSESSMENT:       ICD-10-CM ICD-9-CM    1. Moderate major depression (HCC) F32.1 296.22 [place] HANDLG&/OR CONVEY OF SPEC FOR TR OFFICE TO LAB      COLLECTION VENOUS BLOOD,VENIPUNCTURE   2. Idiopathic gout, unspecified chronicity, unspecified site M10.00 274.9 URIC ACID      [place] [facility]&/OR CONVEY OF SPEC FOR TR OFFICE TO LAB      COLLECTION VENOUS BLOOD,VENIPUNCTURE   3. Peripheral vascular disease (HCC) I73.9 443.9 [place] [facility]&/OR CONVEY OF SPEC FOR TR OFFICE TO LAB      COLLECTION VENOUS BLOOD,VENIPUNCTURE   4. Essential hypertension G99 184.3 METABOLIC PANEL, COMPREHENSIVE      MAGNESIUM      [place] [facility]&/OR CONVEY OF SPEC FOR TR OFFICE TO LAB      COLLECTION VENOUS BLOOD,VENIPUNCTURE   5. Hypercholesteremia E78.00 272.0 [place] HAND[facility]&/OR CONVEY OF SPEC FOR TR OFFICE TO LAB      COLLECTION VENOUS BLOOD,VENIPUNCTURE   6.  Elevated liver function tests R94.5 790.6 HEP B SURFACE AG      HEPATITIS C AB, RFLX TO QT BY PCR      [place] [facility]&/OR CONVEY OF SPEC FOR TR OFFICE TO LAB COLLECTION VENOUS BLOOD,VENIPUNCTURE   7. Cramp in limb R25.2 729.82 [place] HANDLG&/OR CONVEY OF SPEC FOR TR OFFICE TO LAB      COLLECTION VENOUS BLOOD,VENIPUNCTURE   8. Lung nodule, solitary R91.1 793.11 [place] HAND[facility]&/OR CONVEY OF SPEC FOR TR OFFICE TO LAB      COLLECTION VENOUS BLOOD,VENIPUNCTURE   9. Elevated blood sugar R73.9 790.29 HEMOGLOBIN A1C WITH EAG      [place] HAND[facility]&/OR CONVEY OF SPEC FOR TR OFFICE TO LAB      COLLECTION VENOUS BLOOD,VENIPUNCTURE       PLAN    Orders Placed This Encounter    URIC ACID     Standing Status:   Future     Standing Expiration Date:   6/4/9754    METABOLIC PANEL, COMPREHENSIVE     Standing Status:   Future     Standing Expiration Date:   7/5/2020    HEP B SURFACE AG     Standing Status:   Future     Standing Expiration Date:   7/3/2020    HEPATITIS C AB, RFLX TO QT BY PCR     Standing Status:   Future     Standing Expiration Date:   7/3/2020    HEMOGLOBIN A1C WITH EAG     Standing Status:   Future     Standing Expiration Date:   7/2/2020    MAGNESIUM    [place] HANDLG&/OR CONVEY OF SPEC FOR TR OFFICE TO LAB    COLLECTION VENOUS BLOOD,VENIPUNCTURE     May need to [facility] to [facility] LFT[facility]  For now no change in AD  F/U CT for nodule    Follow-up and Dispositions    · Return in about 2 months (around 3/3/2020) for routine follow up.

## 2020-01-04 LAB
ALBUMIN SERPL-MCNC: 4.8 G/DL (ref 3.5–5.5)
ALBUMIN/GLOB SERPL: 1.5 {RATIO} (ref 1.2–2.2)
ALP SERPL-CCNC: 49 IU/L (ref 39–117)
ALT SERPL-CCNC: 22 IU/L (ref 0–44)
AST SERPL-CCNC: 18 IU/L (ref 0–40)
BILIRUB SERPL-MCNC: 0.4 MG/DL (ref 0–1.2)
BUN SERPL-MCNC: 13 MG/DL (ref 6–24)
BUN/CREAT SERPL: 13 (ref 9–20)
CALCIUM SERPL-MCNC: 9.4 MG/DL (ref 8.7–10.2)
CHLORIDE SERPL-SCNC: 103 MMOL/L (ref 96–106)
CO2 SERPL-SCNC: 21 MMOL/L (ref 20–29)
CREAT SERPL-MCNC: 1.01 MG/DL (ref 0.76–1.27)
EST. AVERAGE GLUCOSE BLD GHB EST-MCNC: 126 MG/DL
GLOBULIN SER CALC-MCNC: 3.1 G/DL (ref 1.5–4.5)
GLUCOSE SERPL-MCNC: 103 MG/DL (ref 65–99)
HBA1C MFR BLD: 6 % (ref 4.8–5.6)
HBV SURFACE AG SERPL QL IA: NEGATIVE
HCV AB S/CO SERPL IA: <0.1 S/CO RATIO (ref 0–0.9)
HCV AB SERPL QL IA: NORMAL
MAGNESIUM SERPL-MCNC: 1.9 MG/DL (ref 1.6–2.3)
POTASSIUM SERPL-SCNC: 4.5 MMOL/L (ref 3.5–5.2)
PROT SERPL-MCNC: 7.9 G/DL (ref 6–8.5)
SODIUM SERPL-SCNC: 141 MMOL/L (ref 134–144)
URATE SERPL-MCNC: 9.8 MG/DL (ref 3.7–8.6)

## 2020-01-09 ENCOUNTER — TELEPHONE (OUTPATIENT)
Dept: INTERNAL MEDICINE CLINIC | Age: 50
End: 2020-01-09

## 2020-01-09 DIAGNOSIS — M10.00 IDIOPATHIC GOUT, UNSPECIFIED CHRONICITY, UNSPECIFIED SITE: Primary | ICD-10-CM

## 2020-01-09 NOTE — TELEPHONE ENCOUNTER
----- Message from Garett Reese sent at 1/8/2020  2:19 PM EST -----  Regarding: Dr. Elissa Tillman telephone  General Message/Vendor Calls    Caller's first and last name: Lisa banres/ wife       Reason for call: Pt advised that the medication he was prescribed for the gout pain in his knee isn't relieving the pain and requesting a different medication be sent to the [facility] on file       Callback required yes/no and why: yes       Best contact number(s): [phone]      Details to clarify the request:      Garett Reese

## 2020-01-13 RX ORDER — COLCHICINE 0.6 MG/1
0.6 TABLET ORAL 2 TIMES DAILY
Qty: 60 TAB | Refills: 3 | Status: SHIPPED | OUTPATIENT
Start: 2020-01-13 | End: 2020-04-14 | Stop reason: ALTCHOICE

## 2020-01-13 RX ORDER — ALLOPURINOL 300 MG/1
450 TABLET ORAL DAILY
Qty: 60 TAB | Refills: 5 | Status: SHIPPED | OUTPATIENT
Start: 2020-01-13 | End: 2020-03-03 | Stop reason: SDUPTHER

## 2020-01-13 RX ORDER — INDOMETHACIN 50 MG/1
50 CAPSULE ORAL 3 TIMES DAILY
Qty: 90 CAP | Refills: 2 | Status: SHIPPED | OUTPATIENT
Start: 2020-01-13 | End: 2021-03-09

## 2020-03-03 ENCOUNTER — OFFICE VISIT (OUTPATIENT)
Dept: INTERNAL MEDICINE CLINIC | Age: 50
End: 2020-03-03

## 2020-03-03 VITALS
DIASTOLIC BLOOD PRESSURE: 84 MMHG | WEIGHT: 240 LBS | HEIGHT: 71 IN | RESPIRATION RATE: 16 BRPM | SYSTOLIC BLOOD PRESSURE: 133 MMHG | OXYGEN SATURATION: 98 % | HEART RATE: 94 BPM | BODY MASS INDEX: 33.6 KG/M2 | TEMPERATURE: 97.3 F

## 2020-03-03 DIAGNOSIS — E78.00 HYPERCHOLESTEREMIA: ICD-10-CM

## 2020-03-03 DIAGNOSIS — M10.00 IDIOPATHIC GOUT, UNSPECIFIED CHRONICITY, UNSPECIFIED SITE: ICD-10-CM

## 2020-03-03 DIAGNOSIS — I10 ESSENTIAL HYPERTENSION: ICD-10-CM

## 2020-03-03 DIAGNOSIS — M47.819 ARTHRITIS OF LOW BACK: ICD-10-CM

## 2020-03-03 DIAGNOSIS — R10.13 EPIGASTRIC PAIN: Primary | ICD-10-CM

## 2020-03-03 RX ORDER — ALLOPURINOL 300 MG/1
300 TABLET ORAL 2 TIMES DAILY
Qty: 60 TAB | Refills: 5 | Status: SHIPPED | OUTPATIENT
Start: 2020-03-03 | End: 2020-05-12

## 2020-03-03 RX ORDER — LISINOPRIL 10 MG/1
10 TABLET ORAL DAILY
Qty: 30 TAB | Refills: 5 | Status: SHIPPED | OUTPATIENT
Start: 2020-03-03 | End: 2020-10-22 | Stop reason: RX

## 2020-03-03 RX ORDER — PRAVASTATIN SODIUM 40 MG/1
40 TABLET ORAL
Qty: 30 TAB | Refills: 5 | Status: SHIPPED | OUTPATIENT
Start: 2020-03-03 | End: 2021-01-26 | Stop reason: SDUPTHER

## 2020-03-03 RX ORDER — DICYCLOMINE HYDROCHLORIDE 20 MG/1
20 TABLET ORAL EVERY 6 HOURS
COMMUNITY
End: 2020-04-14 | Stop reason: ALTCHOICE

## 2020-03-03 RX ORDER — SERTRALINE HYDROCHLORIDE 100 MG/1
100 TABLET, FILM COATED ORAL DAILY
Qty: 30 TAB | Refills: 5 | Status: SHIPPED | OUTPATIENT
Start: 2020-03-03 | End: 2020-05-12

## 2020-03-03 RX ORDER — FAMOTIDINE 20 MG/1
20 TABLET, FILM COATED ORAL 2 TIMES DAILY
Qty: 60 TAB | Refills: 5 | Status: SHIPPED | OUTPATIENT
Start: 2020-03-03 | End: 2020-04-14 | Stop reason: ALTCHOICE

## 2020-03-03 NOTE — PROGRESS NOTES
Subjective:     Lloyd Tavarez is a 51 y.o. male who presents for follow up of hypertension, hyperlipidemia and depression. New concerns: back pains x years did CT scan said spine arthritis, Dx with IBS. Seeing GI. Seen in ER. Labs from today were reviewed  no, labs done previously were reviewed  yes, Labs done in ER were reviewed  yes, Additional labs are ordered  no,  UA cont high No DM    Cureently unemployed, depressed and has anger issues. Back bothering him a lot lately. Diet and Lifestyle: smoker 2 daily    Cardiovascular ROS: taking medications as instructed, no medication side effects noted, no TIA's, no chest pain on exertion, notes stable dyspnea on exertion, no change, no swelling of ankles. Review of Systems, additional:  Patient does not complain about: fever, weight loss, recent fecal or urine incontinence, known active cancer, focal paralysis, recent back orthopaedic or other procedure.         Patient Active Problem List    Diagnosis Date Noted    Peripheral vascular disease ([facility] 2020    Essential hypertension 10/01/2019    Hypercholesteremia 10/01/2019    Eczema 10/01/2019    Gout 10/01/2019    Moderate major depression ([address] 10/01/2019       No Known Allergies  Past Medical History:   Diagnosis Date    Depression     Hypercholesterolemia     Hypertension     Psychotic disorder ([facility]      Social History     Tobacco Use    Smoking status: Former Smoker     Packs/day: 1.00     Types: Cigarettes     Last attempt to quit: 2019     Years since quittin.2    Smokeless tobacco: Never Used   Substance Use Topics    Alcohol use: Not Currently     Alcohol/week: 1.0 standard drinks     Types: 1 Cans of beer per week     Frequency: Never     Comment: occ        No results found for: WBC, WBCT, WBCPOC, HGB, HGBPOC, HCT, HCTPOC, PLT, PLTPOC, MCV, MCVPOC, HGBEXT, HCTEXT, PLTEXT  Lab Results   Component Value Date/Time    Hemoglobin A1c 6.0 (H) 2020 12:07 PM    Glucose 103 (H) 01/03/2020 12:07 PM    Creatinine 1.01 01/03/2020 12:07 PM      No results found for: CHOL, CHOLPOCT, HDL, LDL, LDLC, LDLCPOC, LDLCEXT, TRIGL, TGLPOCT, CHHD, CHHDX  Lab Results   Component Value Date/Time    ALT (SGPT) 22 01/03/2020 12:07 PM    AST (SGOT) 18 01/03/2020 12:07 PM    Alk. phosphatase 49 01/03/2020 12:07 PM    Bilirubin, total 0.4 01/03/2020 12:07 PM    Albumin 4.8 01/03/2020 12:07 PM    Protein, total 7.9 01/03/2020 12:07 PM     Lab Results   Component Value Date/Time    GFR est non-AA 88 01/03/2020 12:07 PM    GFR est  01/03/2020 12:07 PM    Creatinine 1.01 01/03/2020 12:07 PM    BUN 13 01/03/2020 12:07 PM    Sodium 141 01/03/2020 12:07 PM    Potassium 4.5 01/03/2020 12:07 PM    Chloride 103 01/03/2020 12:07 PM    CO2 21 01/03/2020 12:07 PM    Magnesium 1.9 01/03/2020 12:07 PM     Lab Results   Component Value Date/Time    Glucose 103 (H) 01/03/2020 12:07 PM             Objective:     Physical exam significant for the following: WNL    Visit Vitals  /84 (BP 1 Location: Left arm, BP Patient Position: Sitting)   Pulse 94   Temp 97.3 °F (36.3 °C) (Temporal)   Resp 16   Ht 5' 11\" (1.803 m)   Wt 240 lb (108.9 kg)   SpO2 98%   BMI 33.47 kg/m²     Appearance: alert, well appearing, and in no distress. General exam: CVS exam BP noted to be well controlled today in office, S1, S2 normal, no gallop, no murmur, chest clear, no JVD, no HSM, no edema. Ximena Art soft no guarding no rebound min tenderness to palp    Assessment/Plan:     hypertension well controlled, stable, hyperlipidemia on max statin  ?/Gerd      ICD-10-CM ICD-9-CM    1. Epigastric pain R10.13 789.06 REFERRAL TO GENERAL SURGERY      famotidine (PEPCID) 20 mg tablet   2. Idiopathic gout, unspecified chronicity, unspecified site M10.00 274.9 allopurinoL (ZYLOPRIM) 300 mg tablet   3. Hypercholesteremia E78.00 272.0 pravastatin (PRAVACHOL) 40 mg tablet   4.  Essential hypertension I10 401.9 lisinopril (PRINIVIL, ZESTRIL) 10 mg tablet 5. Arthritis of low back M47.819 721.90      He prob should be scoped, will send to [address] [place] [address] for that  Other issues stable  Inc allopurinol  Inc zoloft    Orders Placed This Encounter    REFERRAL TO GENERAL SURGERY     Referral Priority:   Routine     Referral Type:   Consultation     Referral Reason:   Specialty Services Required     Referred to Provider:   Stan Holley MD     Number of Visits Requested:   1    dicyclomine (BENTYL) 20 mg tablet     Sig: Take 20 mg by mouth every six (6) hours.  allopurinoL (ZYLOPRIM) 300 mg tablet     Sig: Take 1 Tab by mouth two (2) times a day. Dispense:  60 Tab     Refill:  5    famotidine (PEPCID) 20 mg tablet     Sig: Take 1 Tab by mouth two (2) times a day. Dispense:  60 Tab     Refill:  5    sertraline (ZOLOFT) 100 mg tablet     Sig: Take 1 Tab by mouth daily. Dispense:  30 Tab     Refill:  5    lisinopril (PRINIVIL, ZESTRIL) 10 mg tablet     Sig: Take 1 Tab by mouth daily. Dispense:  30 Tab     Refill:  5    pravastatin (PRAVACHOL) 40 mg tablet     Sig: Take 1 Tab by mouth nightly. Dispense:  30 Tab     Refill:  5       Follow-up and Dispositions    · Return in about 6 weeks (around 4/14/2020) for routine follow up.

## 2020-03-03 NOTE — PROGRESS NOTES
Chief Complaint   Patient presents with    Abdominal Pain     follow up     I have reviewed the patient's medical history in detail and updated the computerized patient record. Health Maintenance reviewed. 1. Have you been to the ER, urgent care clinic since your last visit? Hospitalized since your last visit? yes    2. Have you seen or consulted any other health care providers outside of the [address] [place] [address] since your last visit? Include any pap smears or colon screening. [facility] - ER      Encouraged pt to discuss pt's wishes with spouse/partner/family and bring them in the next appt to follow thru with the Advanced Directive    Fall Risk Assessment, last 12 mths 1/3/2020   Able to walk? Yes   Fall in past 12 months? No       3 most recent PHQ Screens 1/3/2020   Little interest or pleasure in doing things Several days   Feeling down, depressed, irritable, or hopeless Several days   Total Score PHQ 2 2   Trouble falling or staying asleep, or sleeping too much Nearly every day   Feeling tired or having little energy Nearly every day   Poor appetite, weight loss, or overeating More than half the days   Feeling bad about yourself - or that you are a failure or have let yourself or your family down Nearly every day   Trouble concentrating on things such as school, work, reading, or watching TV Nearly every day   Moving or speaking so slowly that other people could have noticed; or the opposite being so fidgety that others notice Several days   Thoughts of being better off dead, or hurting yourself in some way Nearly every day   PHQ 9 Score 20   How difficult have these problems made it for you to do your work, take care of your home and get along with others Extremely difficult       Abuse Screening Questionnaire 1/3/2020   Do you ever feel afraid of your partner? N   Are you in a relationship with someone who physically or mentally threatens you? N   Is it safe for you to go home?  Y       ADL Assessment 1/3/2020   Feeding yourself No Help Needed   Getting from bed to chair No Help Needed   Getting dressed No Help Needed   Bathing or showering No Help Needed   Walk across the room (includes cane/walker) No Help Needed   Using the telphone No Help Needed   Taking your medications No Help Needed   Preparing meals No Help Needed   Managing money (expenses/bills) No Help Needed   Moderately strenuous housework (laundry) No Help Needed   Shopping for personal items (toiletries/medicines) No Help Needed   Shopping for groceries No Help Needed   Driving No Help Needed   Climbing a flight of stairs No Help Needed   Getting to places beyond walking distances No Help Needed

## 2020-03-10 ENCOUNTER — TELEPHONE (OUTPATIENT)
Dept: INTERNAL MEDICINE CLINIC | Age: 50
End: 2020-03-10

## 2020-03-10 DIAGNOSIS — R10.10 PAIN OF UPPER ABDOMEN: Primary | ICD-10-CM

## 2020-03-10 RX ORDER — TRIAMCINOLONE ACETONIDE 1 MG/G
CREAM TOPICAL 2 TIMES DAILY
Qty: 45 G | Refills: 2 | Status: SHIPPED | OUTPATIENT
Start: 2020-03-10 | End: 2020-04-07

## 2020-03-10 NOTE — TELEPHONE ENCOUNTER
Simon Diana, from [facility], called in reference to pt needing auth for ct scan. Please call her at [phone].

## 2020-03-13 ENCOUNTER — TELEPHONE (OUTPATIENT)
Dept: INTERNAL MEDICINE CLINIC | Age: 50
End: 2020-03-13

## 2020-03-13 NOTE — TELEPHONE ENCOUNTER
Please call pt in reference to ct scan being cancelled per yodit. He said that pt needs an endoscopy.

## 2020-03-18 NOTE — TELEPHONE ENCOUNTER
Patients wife states that patient already has an appointment scheduled with Dr Bertis Blizzard for next week for huis endoscopy - CT has been cancelled  Mariposa Uriostegui LPN  5/92/8011  6:49 PM

## 2020-04-14 ENCOUNTER — VIRTUAL VISIT (OUTPATIENT)
Dept: INTERNAL MEDICINE CLINIC | Age: 50
End: 2020-04-14

## 2020-04-14 DIAGNOSIS — F31.9 BIPOLAR DEPRESSION (HCC): Primary | ICD-10-CM

## 2020-04-14 DIAGNOSIS — A04.8 HELICOBACTER PYLORI (H. PYLORI) INFECTION: ICD-10-CM

## 2020-04-14 DIAGNOSIS — N52.2 DRUG-INDUCED ERECTILE DYSFUNCTION: ICD-10-CM

## 2020-04-14 RX ORDER — AMOXICILLIN 500 MG/1
1000 TABLET, FILM COATED ORAL 2 TIMES DAILY
COMMUNITY
Start: 2020-04-07 | End: 2020-04-17

## 2020-04-14 RX ORDER — QUETIAPINE FUMARATE 25 MG/1
75 TABLET, FILM COATED ORAL
Qty: 90 TAB | Refills: 1 | Status: SHIPPED | OUTPATIENT
Start: 2020-04-14 | End: 2020-06-09 | Stop reason: SDUPTHER

## 2020-04-14 RX ORDER — SILDENAFIL 100 MG/1
100 TABLET, FILM COATED ORAL AS NEEDED
Qty: 5 TAB | Refills: 5 | Status: SHIPPED | OUTPATIENT
Start: 2020-04-14 | End: 2021-07-12

## 2020-04-14 RX ORDER — LANSOPRAZOLE 30 MG/1
30 CAPSULE, DELAYED RELEASE ORAL 2 TIMES DAILY
COMMUNITY
Start: 2020-04-07 | End: 2020-04-17

## 2020-04-14 RX ORDER — CLARITHROMYCIN 500 MG/1
500 TABLET, FILM COATED ORAL 2 TIMES DAILY
COMMUNITY
Start: 2020-04-07 | End: 2020-04-17

## 2020-04-14 NOTE — PROGRESS NOTES
Consent: Ev Fleming, who was seen by synchronous (real-time) audio-video technology, and/or his healthcare decision maker, is aware that this patient-initiated, Telehealth encounter on 2020 is a billable service, with coverage as determined by his insurance carrier. He is aware that he may receive a bill and has provided verbal consent to proceed: Yes. PROGRESS NOTE        SUBJECTIVE:  Diagnosis/Chief Complaint: Abdominal Pain (feels much better / follow up after endoscopy/ colonoscopy - Dr Tanisha Srinivasan)  Endoscopist took some samples said he had a bacterial infection. Currently finishing a course of antibiotics and a PPI. Some of his usual medicines have been held due to the above treatment. Supposed to have a breath test later  Doing well with mood yes - better sans pain, but he still has issues with sleep and finds that he is still pretty irritable. See PHQ 9. Symptoms back abdo pain, depression  Relates difficulties with erections, insurance would not pay for the Viagra. Suicidal: no, not homicidal.  Side affects: no  States taking medications per medicine list.yes - some changes, see current med list    Patient Active Problem List    Diagnosis Date Noted    Helicobacter pylori infection 04/15/2020    Peripheral vascular disease ([facility] 2020    Essential hypertension 10/01/2019    Hypercholesteremia 10/01/2019    Eczema 10/01/2019    Gout 10/01/2019    Moderate major depression ([facility] 10/01/2019     No Known Allergies  Social History     Tobacco Use    Smoking status: Former Smoker     Packs/day: 1.00     Types: Cigarettes     Last attempt to quit: 2019     Years since quittin.3    Smokeless tobacco: Never Used   Substance Use Topics    Alcohol use: Not Currently     Alcohol/week: 1.0 standard drinks     Types: 1 Cans of beer per week     Frequency: Never     Comment: occ        OBJECTIVE:    . There were no vitals taken for this visit.   WDWN in NAD  HPsychiatric: Normal mood, judgement    Reviewed: Medications, allergies, clinical lab test results and imaging results have been reviewed. Any abnormal findings have been addressed. ASSESSMENT:       ICD-10-CM ICD-9-CM    1. Bipolar depression ([facility] F31.9 296.50    2. Drug-induced erectile dysfunction N52.2 607.84 sildenafil citrate (VIAGRA) 100 mg tablet     E980.5    3. Helicobacter pylori (H. pylori) infection A04.8 041.86        PLAN    Orders Placed This Encounter    amoxicillin 500 mg tab     Sig: Take 1,000 mg by mouth two (2) times a day.  clarithromycin (BIAXIN) 500 mg tablet     Sig: Take 500 mg by mouth two (2) times a day.  lansoprazole (PREVACID) 30 mg capsule     Sig: Take 30 mg by mouth two (2) times a day.  sildenafil citrate (VIAGRA) 100 mg tablet     Sig: Take 1 Tab by mouth as needed for Erectile Dysfunction. Dispense:  5 Tab     Refill:  5    QUEtiapine (SEROquel) 25 mg tablet     Sig: Take 3 Tabs by mouth nightly. Start 1 tablet daily, usually in the evening, every few days increase as tolerated, mood. Dispense:  90 Tab     Refill:  1     Current Outpatient Medications   Medication Sig Dispense Refill    amoxicillin 500 mg tab Take 1,000 mg by mouth two (2) times a day.  clarithromycin (BIAXIN) 500 mg tablet Take 500 mg by mouth two (2) times a day.  lansoprazole (PREVACID) 30 mg capsule Take 30 mg by mouth two (2) times a day.  sildenafil citrate (VIAGRA) 100 mg tablet Take 1 Tab by mouth as needed for Erectile Dysfunction. 5 Tab 5    QUEtiapine (SEROquel) 25 mg tablet Take 3 Tabs by mouth nightly. Start 1 tablet daily, usually in the evening, every few days increase as tolerated, mood. 90 Tab 1    triamcinolone acetonide (KENALOG) 0.1 % topical cream APPLY  CREAM EXTERNALLY TO AFFECTED AREA TWICE DAILY . USE  THIN  LAYER 45 g 2    allopurinoL (ZYLOPRIM) 300 mg tablet Take 1 Tab by mouth two (2) times a day.  60 Tab 5    sertraline (ZOLOFT) 100 mg tablet Take 1 Tab by mouth daily. 30 Tab 5    lisinopril (PRINIVIL, ZESTRIL) 10 mg tablet Take 1 Tab by mouth daily. 30 Tab 5    pravastatin (PRAVACHOL) 40 mg tablet Take 1 Tab by mouth nightly. 30 Tab 5    aspirin 81 mg chewable tablet Take 81 mg by mouth daily.  magnesium 250 mg tab Take  by mouth. Since sleep seems to be a significant issue and it does not seem as if the bupropion seems to be helping that much he will wean off that and start Seroquel. Discussed possible side affects, precautions, and drug interactions and possible benefits of the medication(s). Continue follow-up with surgeons, start medicines after done with antibiotics. For ED discussed paying for generic Viagra which is relatively inexpensive. Hypertension stable, hypercholesterolemia stable. Follow-up and Dispositions    · Return in about 6 weeks (around 5/26/2020).

## 2020-04-14 NOTE — PROGRESS NOTES
Chief Complaint   Patient presents with    Abdominal Pain     feels much better / follow up after endoscopy/ colonoscopy - Dr Carlos Luevano, last 12 mths 4/14/2020   Able to walk? Yes   Fall in past 12 months? No       3 most recent PHQ Screens 4/14/2020   Little interest or pleasure in doing things Several days   Feeling down, depressed, irritable, or hopeless Several days   Total Score PHQ 2 2   Trouble falling or staying asleep, or sleeping too much -   Feeling tired or having little energy -   Poor appetite, weight loss, or overeating -   Feeling bad about yourself - or that you are a failure or have let yourself or your family down -   Trouble concentrating on things such as school, work, reading, or watching TV -   Moving or speaking so slowly that other people could have noticed; or the opposite being so fidgety that others notice -   Thoughts of being better off dead, or hurting yourself in some way -   PHQ 9 Score -   How difficult have these problems made it for you to do your work, take care of your home and get along with others -       Abuse Screening Questionnaire 4/14/2020   Do you ever feel afraid of your partner? N   Are you in a relationship with someone who physically or mentally threatens you? N   Is it safe for you to go home?  Y       ADL Assessment 4/14/2020   Feeding yourself No Help Needed   Getting from bed to chair No Help Needed   Getting dressed No Help Needed   Bathing or showering No Help Needed   Walk across the room (includes cane/walker) No Help Needed   Using the telphone No Help Needed   Taking your medications No Help Needed   Preparing meals No Help Needed   Managing money (expenses/bills) No Help Needed   Moderately strenuous housework (laundry) No Help Needed   Shopping for personal items (toiletries/medicines) No Help Needed   Shopping for groceries No Help Needed   Driving No Help Needed   Climbing a flight of stairs No Help Needed   Getting to places beyond walking distances No Help Needed

## 2020-04-15 PROBLEM — A04.8 HELICOBACTER PYLORI INFECTION: Status: ACTIVE | Noted: 2020-04-15

## 2020-05-11 ENCOUNTER — TELEPHONE (OUTPATIENT)
Dept: INTERNAL MEDICINE CLINIC | Age: 50
End: 2020-05-11

## 2020-05-11 NOTE — TELEPHONE ENCOUNTER
----- Message from Carole Wilson sent at 5/11/2020  1:03 PM EDT -----  Regarding: Dr. Javid Schmid Message/Vendor Calls    Caller's first and last name:  Louis Thomson, Pt's wife      Reason for call:  Ms. Reyes Quan requesting Dr. Yessi Bassett to call in a prescription for a smoking cessation patch to [facility] in [address] [place] [address] for patient. Also, Ms. Marcella Cerda is requesting Dr. Yessi Bassett to schedule another scan of patient's lungs. Callback required yes/no and why:  Yes. Ms. Reyes Quan would like to know if Dr. Yessi Bassett kaila call in prescription and schedule a lung scan for patient.       Best contact number(s):  [phone]      Details to clarify the request:      Carole Wilson

## 2020-05-12 ENCOUNTER — VIRTUAL VISIT (OUTPATIENT)
Dept: INTERNAL MEDICINE CLINIC | Age: 50
End: 2020-05-12

## 2020-05-12 ENCOUNTER — TELEPHONE (OUTPATIENT)
Dept: INTERNAL MEDICINE CLINIC | Age: 50
End: 2020-05-12

## 2020-05-12 DIAGNOSIS — F32.2 CURRENT SEVERE EPISODE OF MAJOR DEPRESSIVE DISORDER WITHOUT PSYCHOTIC FEATURES, UNSPECIFIED WHETHER RECURRENT (HCC): ICD-10-CM

## 2020-05-12 DIAGNOSIS — Z72.0 TOBACCO ABUSE: ICD-10-CM

## 2020-05-12 DIAGNOSIS — R07.89 LEFT-SIDED CHEST WALL PAIN: ICD-10-CM

## 2020-05-12 RX ORDER — IBUPROFEN 200 MG
1 TABLET ORAL EVERY 24 HOURS
Qty: 30 PATCH | Refills: 1 | Status: SHIPPED | OUTPATIENT
Start: 2020-05-12 | End: 2020-05-29 | Stop reason: ALTCHOICE

## 2020-05-12 RX ORDER — SERTRALINE HYDROCHLORIDE 50 MG/1
50 TABLET, FILM COATED ORAL DAILY
Qty: 30 TAB | Refills: 5 | OUTPATIENT
Start: 2020-05-12

## 2020-05-12 RX ORDER — VENLAFAXINE 25 MG/1
25 TABLET ORAL 3 TIMES DAILY
Qty: 90 TAB | Refills: 1 | Status: SHIPPED | OUTPATIENT
Start: 2020-05-12 | End: 2020-07-06 | Stop reason: SDUPTHER

## 2020-05-12 RX ORDER — FAMOTIDINE 40 MG/1
40 TABLET, FILM COATED ORAL DAILY
Qty: 30 TAB | Refills: 5 | Status: SHIPPED | OUTPATIENT
Start: 2020-05-12 | End: 2020-05-29 | Stop reason: ALTCHOICE

## 2020-05-12 NOTE — TELEPHONE ENCOUNTER
Didier, pts wife, called in reference to pts ct scan being on May 19, 2020 at 10:30am. This will need a PA. He is going to [facility].

## 2020-05-12 NOTE — PROGRESS NOTES
Chief Complaint   Patient presents with    Hypertension     med refill     Patient has not been out of the country in 45-90 days, NO diarrhea, NO cough, NO chest conjestion, NO temp. Pt has not been around anyone with these symptoms. Health Maintenance reviewed. I have reviewed the patient's medical history in detail and updated the computerized patient record. 1. Have you been to the ER, urgent care clinic since your last visit? Hospitalized since your last visit?no    2. Have you seen or consulted any other health care providers outside of the [address] [place] [address] since your last visit? Include any pap smears or colon screening. No    Encouraged pt to discuss pt's wishes with spouse/partner/family and bring them in the next appt to follow thru with the Advanced Directive    Fall Risk Assessment, last 12 mths 4/14/2020   Able to walk? Yes   Fall in past 12 months? No       3 most recent PHQ Screens 4/14/2020   Little interest or pleasure in doing things Several days   Feeling down, depressed, irritable, or hopeless Several days   Total Score PHQ 2 2   Trouble falling or staying asleep, or sleeping too much Nearly every day   Feeling tired or having little energy Nearly every day   Poor appetite, weight loss, or overeating Not at all   Feeling bad about yourself - or that you are a failure or have let yourself or your family down Nearly every day   Trouble concentrating on things such as school, work, reading, or watching TV Nearly every day   Moving or speaking so slowly that other people could have noticed; or the opposite being so fidgety that others notice Nearly every day   Thoughts of being better off dead, or hurting yourself in some way Several days   PHQ 9 Score 18   How difficult have these problems made it for you to do your work, take care of your home and get along with others Very difficult       Abuse Screening Questionnaire 4/14/2020   Do you ever feel afraid of your partner?  Preet Tripathi Are you in a relationship with someone who physically or mentally threatens you? N   Is it safe for you to go home?  Y       ADL Assessment 4/14/2020   Feeding yourself No Help Needed   Getting from bed to chair No Help Needed   Getting dressed No Help Needed   Bathing or showering No Help Needed   Walk across the room (includes cane/walker) No Help Needed   Using the telphone No Help Needed   Taking your medications No Help Needed   Preparing meals No Help Needed   Managing money (expenses/bills) No Help Needed   Moderately strenuous housework (laundry) No Help Needed   Shopping for personal items (toiletries/medicines) No Help Needed   Shopping for groceries No Help Needed   Driving No Help Needed   Climbing a flight of stairs No Help Needed   Getting to places beyond walking distances No Help Needed

## 2020-05-12 NOTE — TELEPHONE ENCOUNTER
Requested Prescriptions     Pending Prescriptions Disp Refills    sertraline (ZOLOFT) 50 mg tablet 30 Tab 5     Sig: Take 1 Tab by mouth daily. Start half tablet daily, usually in the evening, every few days increase as tolerated,mood.

## 2020-05-12 NOTE — PROGRESS NOTES
HISTORY OF PRESENT ILLNESS  Jaqui Hughes is a 51 y.o. male. Hypertension    The history is provided by the patient. This is a new problem. Episode onset: 1 mo. The problem has not changed since onset. Associated symptoms include chest pain, anxiety, shortness of breath and nausea. Pertinent negatives include no peripheral edema. Associated symptoms comments: Side pain left. Risk factors include smoking/tobacco exposure, obesity, male gender and hypertension. Interested in quitting smoking. Quit for a little while but has restarted. Smoking a pack a day. Asks about smoking cessation medications. So far no luck in finding a psychiatrist, he continues pretty depressed. Sleeping better with Seroquel but mood no better see PHQ 9  Had a chest CT scan done 2019 in the emergency room. Has some abdominal pain. Chest CT showed 8.3 mm nodule with recommended follow-up in a few months. Review of Systems   Constitutional: Negative for fever. Respiratory: Positive for shortness of breath. Negative for cough. Cardiovascular: Positive for chest pain. Gastrointestinal: Positive for nausea. Negative for abdominal pain and blood in stool. Musculoskeletal: Negative for falls. Psychiatric/Behavioral: Positive for depression. Negative for suicidal ideas.      No Known Allergies    Social History     Socioeconomic History    Marital status:      Spouse name: Not on file    Number of children: Not on file    Years of education: Not on file    Highest education level: Not on file   Occupational History    Occupation: unemployed   Social Needs    Financial resource strain: Not on file    Food insecurity     Worry: Not on file     Inability: Not on file   [employer] needs     Medical: Not on file     Non-medical: Not on file   Tobacco Use    Smoking status: Current Every Day Smoker     Packs/day: 1.00     Types: Cigarettes     Last attempt to quit: 2019     Years since quittin.4    Smokeless tobacco: Never Used   Substance and Sexual Activity    Alcohol use: Not Currently     Alcohol/week: 1.0 standard drinks     Types: 1 Cans of beer per week     Frequency: Never     Comment: occ    Drug use: Never    Sexual activity: Yes     Partners: Female   Lifestyle    Physical activity     Days per week: Not on file     Minutes per session: Not on file    Stress: Not on file   Relationships    Social connections     Talks on phone: Not on file     Gets together: Not on file     Attends Jewish service: Not on file     Active member of club or organization: Not on file     Attends meetings of clubs or organizations: Not on file     Relationship status: Not on file    Intimate partner violence     Fear of current or ex partner: Not on file     Emotionally abused: Not on file     Physically abused: Not on file     Forced sexual activity: Not on file   Other Topics Concern    Not on file   Social History Narrative    Not on file       Physical Exam  No acute distress  ASSESSMENT and PLAN  Encounter Diagnoses   Name Primary?  Lung nodule < 6cm on CT Yes    Left-sided chest wall pain     Tobacco abuse     Current severe episode of major depressive disorder without psychotic features, unspecified whether [facility]      Orders Placed This Encounter    CT CHEST WO CONT    REFERRAL TO PSYCHIATRY    nicotine (NICODERM CQ) 21 mg/24 hr    venlafaxine (EFFEXOR) 25 mg tablet    famotidine (PEPCID) 40 mg tablet     Treatment resistant depression, switch sertraline to venlafaxine. Wean off sertraline. Consider GERD, treatment as above. Will get CT scan to evaluate further. Chest pain precautions given. Cause of chest pain not entirely clear. Seems somewhat musculoskeletal.    The patient was counseled on the dangers of tobacco use, and was advised to quit. Reviewed strategies to maximize success, including pharmacotherapy (nicoderm patch).         F/u 1 mo

## 2020-05-12 NOTE — LETTER
5/12/2020 1:52 PM 
 
Mr. Katie Small [address] [address] RE:  REFERRAL TO PSYCHIATRY Dear  Ne Castro: 
 
Mortimer Heath you will find the above named referral.  Please do not hesitate to contact this office if you have any questions. Sincerely, Anthony Gayle MD

## 2020-05-12 NOTE — TELEPHONE ENCOUNTER
Returned call to Daljit Man and changed the CT chest scan wo cont to [facility] with their approval and notified Dr. Harmony Miramontes by link.

## 2020-05-20 ENCOUNTER — DOCUMENTATION ONLY (OUTPATIENT)
Dept: INTERNAL MEDICINE CLINIC | Age: 50
End: 2020-05-20

## 2020-05-21 ENCOUNTER — TELEPHONE (OUTPATIENT)
Dept: INTERNAL MEDICINE CLINIC | Age: 50
End: 2020-05-21

## 2020-05-21 NOTE — TELEPHONE ENCOUNTER
Madelyn Howell, from [facility], called in reference to wanting to speak with the nurse about a recommendation from his ct of thorax. Please call her at [phone].

## 2020-05-21 NOTE — TELEPHONE ENCOUNTER
LM sorry to have missed your call, please call us back and leave a message on what exactly the order should say.

## 2020-05-28 ENCOUNTER — TELEPHONE (OUTPATIENT)
Dept: INTERNAL MEDICINE CLINIC | Age: 50
End: 2020-05-28

## 2020-05-29 ENCOUNTER — VIRTUAL VISIT (OUTPATIENT)
Dept: INTERNAL MEDICINE CLINIC | Age: 50
End: 2020-05-29

## 2020-05-29 DIAGNOSIS — K21.9 GASTROESOPHAGEAL REFLUX DISEASE, ESOPHAGITIS PRESENCE NOT SPECIFIED: Primary | ICD-10-CM

## 2020-05-29 DIAGNOSIS — Z72.0 TOBACCO ABUSE: ICD-10-CM

## 2020-05-29 DIAGNOSIS — M10.00 IDIOPATHIC GOUT, UNSPECIFIED CHRONICITY, UNSPECIFIED SITE: ICD-10-CM

## 2020-05-29 DIAGNOSIS — I10 ESSENTIAL HYPERTENSION: ICD-10-CM

## 2020-05-29 RX ORDER — VARENICLINE TARTRATE 25 MG
KIT ORAL
Qty: 1 DOSE PACK | Refills: 0 | Status: SHIPPED | OUTPATIENT
Start: 2020-05-29 | End: 2020-07-07 | Stop reason: DRUGHIGH

## 2020-05-29 RX ORDER — OMEPRAZOLE 40 MG/1
40 CAPSULE, DELAYED RELEASE ORAL DAILY
Qty: 30 CAP | Refills: 5 | Status: SHIPPED | OUTPATIENT
Start: 2020-05-29 | End: 2021-01-26 | Stop reason: SDUPTHER

## 2020-05-29 RX ORDER — COLCHICINE 0.6 MG/1
0.6 TABLET ORAL DAILY
Qty: 30 TAB | Refills: 3 | Status: SHIPPED | OUTPATIENT
Start: 2020-05-29 | End: 2020-10-22 | Stop reason: RX

## 2020-05-29 RX ORDER — METOCLOPRAMIDE 5 MG/1
5 TABLET ORAL
Qty: 30 TAB | Refills: 1 | Status: SHIPPED | OUTPATIENT
Start: 2020-05-29 | End: 2020-06-08

## 2020-05-29 RX ORDER — METRONIDAZOLE 250 MG/1
TABLET ORAL 3 TIMES DAILY
COMMUNITY
End: 2020-05-29 | Stop reason: ALTCHOICE

## 2020-05-29 RX ORDER — ALLOPURINOL 300 MG/1
TABLET ORAL
Qty: 30 TAB | Refills: 5 | Status: SHIPPED | OUTPATIENT
Start: 2020-05-29 | End: 2020-05-29 | Stop reason: DRUGHIGH

## 2020-05-29 RX ORDER — ALLOPURINOL 100 MG/1
100 TABLET ORAL DAILY
Qty: 30 TAB | Refills: 5 | Status: SHIPPED | OUTPATIENT
Start: 2020-05-29 | End: 2020-08-04 | Stop reason: DRUGHIGH

## 2020-05-29 NOTE — PROGRESS NOTES
HISTORY OF PRESENT ILLNESS  Rocky Galaviz is a 51 y.o. male. Abdominal Pain   The history is provided by the patient. This is a recurrent problem. The problem occurs every several days. Associated symptoms include abdominal pain. Treatments tried: metoclopramide. The treatment provided moderate relief. Prescribed by the emergency room. Interested in quitting smoking. Still having some gout symptoms not too severe. Takes allopurinol as needed, instructed needs to take daily to prevent gout attacks. Mood is been stable. Review of Systems   Constitutional: Negative for fever. Respiratory: Negative for cough. Gastrointestinal: Positive for abdominal pain, heartburn and nausea. Negative for blood in stool and vomiting. Psychiatric/Behavioral: Negative for suicidal ideas.      Patient Active Problem List   Diagnosis Code    Essential hypertension I10    Hypercholesteremia E78.00    Eczema L30.9    Gout M10.9    Moderate major depression ([facility] F32.1    Peripheral vascular disease (HCC) V81.9    Helicobacter pylori infection A04.8     Social History     Socioeconomic History    Marital status:      Spouse name: Not on file    Number of children: Not on file    Years of education: Not on file    Highest education level: Not on file   Occupational History    Occupation: unemployed   Social Needs    Financial resource strain: Not on file    Food insecurity     Worry: Not on file     Inability: Not on file   [employer] needs     Medical: Not on file     Non-medical: Not on file   Tobacco Use    Smoking status: Current Every Day Smoker     Packs/day: 1.00     Types: Cigarettes     Last attempt to quit: 2019     Years since quittin.4    Smokeless tobacco: Never Used   Substance and Sexual Activity    Alcohol use: Not Currently     Alcohol/week: 1.0 standard drinks     Types: 1 Cans of beer per week     Frequency: Never     Comment: occ    Drug use: Never    Sexual activity: Yes     Partners: Female   Lifestyle    Physical activity     Days per week: Not on file     Minutes per session: Not on file    Stress: Not on file   Relationships    Social connections     Talks on phone: Not on file     Gets together: Not on file     Attends Nondenominational service: Not on file     Active member of club or organization: Not on file     Attends meetings of clubs or organizations: Not on file     Relationship status: Not on file    Intimate partner violence     Fear of current or ex partner: Not on file     Emotionally abused: Not on file     Physically abused: Not on file     Forced sexual activity: Not on file   Other Topics Concern    Not on file   Social History Narrative    Not on file       Physical Exam  Appears no acute distress  ASSESSMENT and PLAN  Encounter Diagnoses   Name Primary?  Gastroesophageal reflux disease, esophagitis presence not specified Yes    Idiopathic gout, unspecified chronicity, unspecified site     Essential hypertension     Tobacco abuse      Orders Placed This Encounter    DISCONTD: metroNIDAZOLE (FlagyL) 250 mg tablet    DISCONTD: allopurinoL (ZYLOPRIM) 300 mg tablet    colchicine 0.6 mg tablet    allopurinoL (ZYLOPRIM) 100 mg tablet    omeprazole (PRILOSEC) 40 mg capsule    varenicline (CHANTIX STARTER BARBER) 0.5 mg (11)- 1 mg (42) DsPk    metoclopramide HCl (REGLAN) 5 mg tablet     Okay Reglan. Discussed possible side affects, precautions, and drug interactions and possible benefits of the medication(s). Switch from H2 blocker to PPI. General gout instructions given. Follow-up and Dispositions    · Return in about 4 weeks (around 6/26/2020) for routine follow up.

## 2020-05-29 NOTE — PROGRESS NOTES
Consent: Cleo Huntley, who was seen by synchronous (real-time) audio-video technology, and/or his healthcare decision maker, is aware that this patient-initiated, Telehealth encounter on 5/29/2020 is a billable service, with coverage as determined by his insurance carrier. He is aware that he may receive a bill and has provided verbal consent to proceed: Yes. Patient was instructed on the limits of making a diagnosis at this visit. Was instructed to call us or go to the emergency room if the symptoms increased or if new symptoms appeared.

## 2020-05-29 NOTE — PROGRESS NOTES
Chief Complaint   Patient presents with    Abdominal Pain     ER follow up - abd pain     Health Maintenance reviewed. I have reviewed the patient's medical history in detail and updated the computerized patient record. Patient has not been out of the country in 45-90 days, NO diarrhea, NO cough, NO chest conjestion, NO temp. Pt has not been around anyone with these symptoms. 1. Have you been to the ER, urgent care clinic since your last visit? Hospitalized since your last visit? yes    2. Have you seen or consulted any other health care providers outside of the [address] [place] [address] since your last visit? Include any pap smears or colon screening. Yes      Encouraged pt to discuss pt's wishes with spouse/partner/family and bring them in the next appt to follow thru with the Advanced Directive    Fall Risk Assessment, last 12 mths 4/14/2020   Able to walk? Yes   Fall in past 12 months?  No       3 most recent PHQ Screens 5/12/2020   Little interest or pleasure in doing things Nearly every day   Feeling down, depressed, irritable, or hopeless Nearly every day   Total Score PHQ 2 6   Trouble falling or staying asleep, or sleeping too much Nearly every day   Feeling tired or having little energy Nearly every day   Poor appetite, weight loss, or overeating Not at all   Feeling bad about yourself - or that you are a failure or have let yourself or your family down Nearly every day   Trouble concentrating on things such as school, work, reading, or watching TV Nearly every day   Moving or speaking so slowly that other people could have noticed; or the opposite being so fidgety that others notice Not at all   Thoughts of being better off dead, or hurting yourself in some way Not at all   PHQ 9 Score 18   How difficult have these problems made it for you to do your work, take care of your home and get along with others Very difficult       Abuse Screening Questionnaire 4/14/2020   Do you ever feel afraid of your partner? N   Are you in a relationship with someone who physically or mentally threatens you? N   Is it safe for you to go home?  Y       ADL Assessment 4/14/2020   Feeding yourself No Help Needed   Getting from bed to chair No Help Needed   Getting dressed No Help Needed   Bathing or showering No Help Needed   Walk across the room (includes cane/walker) No Help Needed   Using the telphone No Help Needed   Taking your medications No Help Needed   Preparing meals No Help Needed   Managing money (expenses/bills) No Help Needed   Moderately strenuous housework (laundry) No Help Needed   Shopping for personal items (toiletries/medicines) No Help Needed   Shopping for groceries No Help Needed   Driving No Help Needed   Climbing a flight of stairs No Help Needed   Getting to places beyond walking distances No Help Needed

## 2020-06-02 ENCOUNTER — TELEPHONE (OUTPATIENT)
Dept: INTERNAL MEDICINE CLINIC | Age: 50
End: 2020-06-02

## 2020-06-02 NOTE — TELEPHONE ENCOUNTER
6/2/20 Plan called @ 3 [phone] PA for Colchicine 0.6 mg capsules completed with PA rep,Purnima OLIVIER.she state capsules is covered by plan without a PA. Claim pending outcome,decision will be faxted to office within 24 hours. Case ID: [id number]

## 2020-06-02 NOTE — TELEPHONE ENCOUNTER
20 [employer] called @ [phone],ON approval for Colchicine 0.6 mg Tablets was approved [id number] Duration:6 months Effective 2020 - 2020. Tara state it went thru.

## 2020-06-09 ENCOUNTER — VIRTUAL VISIT (OUTPATIENT)
Dept: INTERNAL MEDICINE CLINIC | Age: 50
End: 2020-06-09

## 2020-06-09 DIAGNOSIS — I10 ESSENTIAL HYPERTENSION: ICD-10-CM

## 2020-06-09 DIAGNOSIS — M10.00 IDIOPATHIC GOUT, UNSPECIFIED CHRONICITY, UNSPECIFIED SITE: ICD-10-CM

## 2020-06-09 DIAGNOSIS — F31.9 BIPOLAR DEPRESSION (HCC): ICD-10-CM

## 2020-06-09 DIAGNOSIS — K21.9 GASTROESOPHAGEAL REFLUX DISEASE, ESOPHAGITIS PRESENCE NOT SPECIFIED: ICD-10-CM

## 2020-06-09 DIAGNOSIS — F32.1 MODERATE MAJOR DEPRESSION (HCC): Primary | ICD-10-CM

## 2020-06-09 RX ORDER — QUETIAPINE FUMARATE 25 MG/1
TABLET, FILM COATED ORAL
Qty: 90 TAB | Refills: 5 | Status: SHIPPED | OUTPATIENT
Start: 2020-06-09 | End: 2021-01-26 | Stop reason: DRUGHIGH

## 2020-06-09 NOTE — PROGRESS NOTES
Payal Hauser is a 51 y.o. male who was phone evaluated on 2020. Consent:  He and/or his healthcare decision maker is aware that this patient-initiated Telehealth encounter is a billable service, with coverage as determined by his insurance carrier. He is aware that he may receive a bill and has provided verbal consent to proceed: Yes    I was in the office while conducting this encounter. More than 25 minutes spent with him. Assessment & Plan:       ICD-10-CM ICD-9-CM    1. Moderate major depression ([facility]) F32.1 296.22 REFERRAL TO BEHAVIORAL HEALTH      QUEtiapine (SEROquel) 25 mg tablet   2. Idiopathic gout, unspecified chronicity, unspecified site M10.00 274.9    3. Essential hypertension I10 401.9    4. Gastroesophageal reflux disease, esophagitis presence not specified K21.9 530.81    5. Bipolar depression ([facility] F31.9 296.50      Orders Placed This Encounter    REFERRAL TO BEHAVIORAL HEALTH     Referral Priority:   Routine     Referral Type:   Behavioral Health     Referral Reason:   Specialty Services Required     Referred to Provider:   Coni Zhang LCSW     Number of Visits Requested:   1    QUEtiapine (SEROquel) 25 mg tablet     Si in AM 2 in the evening     Dispense:  90 Tab     Refill:  5     Increase Seroquel as above, will get him into counseling. Continue venlafaxine. GERD stable continue PPI  Hypertension stable. Current Outpatient Medications   Medication Sig Dispense Refill    QUEtiapine (SEROquel) 25 mg tablet 1 in AM 2 in the evening 90 Tab 5    colchicine 0.6 mg tablet Take 1 Tab by mouth daily. 30 Tab 3    allopurinoL (ZYLOPRIM) 100 mg tablet Take 1 Tab by mouth daily. 30 Tab 5    omeprazole (PRILOSEC) 40 mg capsule Take 1 Cap by mouth daily. 30 Cap 5    varenicline (CHANTIX STARTER BARBER) 0.5 mg (11)- 1 mg (42) DsPk Use as directed 1 Dose Pack 0    sildenafil citrate (VIAGRA) 100 mg tablet Take 1 Tab by mouth as needed for Erectile Dysfunction.  5 Tab 5    triamcinolone acetonide (KENALOG) 0.1 % topical cream APPLY  CREAM EXTERNALLY TO AFFECTED AREA TWICE DAILY . USE  THIN  LAYER 45 g 2    lisinopril (PRINIVIL, ZESTRIL) 10 mg tablet Take 1 Tab by mouth daily. 30 Tab 5    pravastatin (PRAVACHOL) 40 mg tablet Take 1 Tab by mouth nightly. 30 Tab 5    aspirin 81 mg chewable tablet Take 81 mg by mouth daily.  venlafaxine (EFFEXOR) 25 mg tablet Take 1 Tab by mouth three (3) times daily. Start 1 tablet daily, usually in the evening, every few days increase as tolerated, mood. 90 Tab 1    magnesium 250 mg tab Take  by mouth. 712  Subjective: On new med for stomach and mood swings. Min c/o abdo pain. Min GERD. Starting seroquel half tab BID min change in sleep noted. still trouble getting to sleep and early a.m. awakenings. No phone service so not tried to call. He has been referred to psychiatric services  Seen in past, psych said PTSD. Not been able to keep a job due to some of his behavioral and inability to pay attention.   WEllbutrin zoloft did not help    Patient Active Problem List   Diagnosis Code    Essential hypertension I10    Hypercholesteremia E78.00    Eczema L30.9    Gout M10.9    Moderate major depression ([address] F32.1    Peripheral vascular disease (HCC) J84.0    Helicobacter pylori infection A04.8     Social History     Socioeconomic History    Marital status:      Spouse name: Not on file    Number of children: Not on file    Years of education: Not on file    Highest education level: Not on file   Occupational History    Occupation: unemployed   Social Needs    Financial resource strain: Not on file    Food insecurity     Worry: Not on file     Inability: Not on file   [employer] needs     Medical: Not on file     Non-medical: Not on file   Tobacco Use    Smoking status: Current Every Day Smoker     Packs/day: 1.00     Types: Cigarettes     Last attempt to quit: 2019     Years since quittin.5    Smokeless tobacco: Never Used   Substance and Sexual Activity    Alcohol use: Not Currently     Alcohol/week: 1.0 standard drinks     Types: 1 Cans of beer per week     Frequency: Never     Comment: occ    Drug use: Never    Sexual activity: Yes     Partners: Female   Lifestyle    Physical activity     Days per week: Not on file     Minutes per session: Not on file    Stress: Not on file   Relationships    Social connections     Talks on phone: Not on file     Gets together: Not on file     Attends Zoroastrianism service: Not on file     Active member of club or organization: Not on file     Attends meetings of clubs or organizations: Not on file     Relationship status: Not on file    Intimate partner violence     Fear of current or ex partner: Not on file     Emotionally abused: Not on file     Physically abused: Not on file     Forced sexual activity: Not on file   Other Topics Concern    Not on file   Social History Narrative    Not on file       We discussed the expected course, resolution and complications of the diagnosis(es) in detail. Medication risks, benefits, costs, interactions, and alternatives were discussed as indicated. I advised him to contact the office if his condition worsens, changes or fails to improve as anticipated. He expressed understanding with the diagnosis(es) and plan. Pursuant to the emergency declaration under the [facility] waiver authority and the [employer] and [employer] Act, this Virtual  Visit was conducted, with patient's consent, to reduce the patient's risk of exposure to COVID-19 and provide continuity of care for an established patient. Services were provided through a video synchronous discussion virtually to substitute for in-person clinic visit.     Luis Alberto De La Cruz MD    PROGRESS NOTE        SUBJECTIVE:  Diagnosis/Chief Complaint: Follow-up      Doing well with mood no  Symptoms see PHQ 9  Suicidal: no  Side affects: no  States taking medications per medicine list.yes -as prescribed    OBJECTIVE:    . There were no vitals taken for this visit. WDWN in NAD  Psychiatric: Depressed, judgement normal    Reviewed: Medications, allergies, clinical lab test results and imaging results have been reviewed. Any abnormal findings have been addressed.      ASSESSMENT:     See above  PLAN    See above

## 2020-07-07 ENCOUNTER — VIRTUAL VISIT (OUTPATIENT)
Dept: INTERNAL MEDICINE CLINIC | Age: 50
End: 2020-07-07

## 2020-07-07 DIAGNOSIS — R51.9 HEADACHE, UNSPECIFIED HEADACHE TYPE: ICD-10-CM

## 2020-07-07 DIAGNOSIS — Z72.0 TOBACCO ABUSE: ICD-10-CM

## 2020-07-07 DIAGNOSIS — R10.13 EPIGASTRIC PAIN: Primary | ICD-10-CM

## 2020-07-07 DIAGNOSIS — F32.1 MODERATE MAJOR DEPRESSION (HCC): ICD-10-CM

## 2020-07-07 RX ORDER — BUTALBITAL, ACETAMINOPHEN AND CAFFEINE 50; 325; 40 MG/1; MG/1; MG/1
1 TABLET ORAL
Qty: 12 TAB | Refills: 0 | Status: SHIPPED | OUTPATIENT
Start: 2020-07-07 | End: 2020-08-23

## 2020-07-07 RX ORDER — VARENICLINE TARTRATE 1 MG/1
1 TABLET, FILM COATED ORAL 2 TIMES DAILY
Qty: 60 TAB | Refills: 3 | Status: SHIPPED | OUTPATIENT
Start: 2020-07-07 | End: 2020-10-05

## 2020-07-07 NOTE — LETTER
7/7/2020 1:11 PM 
 
Mr. Kori Garg [address] [address] RE:  US ABD COMP AND REFERRAL TO GASTROENTEROLOGY Dear Mr. Tiffanie Sloan: 
 
 
Sarah Thomas you will find the above named [facility] and  referral.  Please do not hesitate to contact this office if you have any questions. Sincerely, Amada Hilliard MD

## 2020-07-07 NOTE — PROGRESS NOTES
Chief Complaint   Patient presents with    Depression     follow up     Health Maintenance reviewed. I have reviewed the patient's medical history in detail and updated the computerized patient record. Patient has not been out of the country in (3-4 months) 90 -120 days, NO diarrhea, NO cough, NO chest conjestion, NO temp. Pt has not been around anyone with these symptoms. 1. Have you been to the ER, urgent care clinic since your last visit? Hospitalized since your last visit?no    2. Have you seen or consulted any other health care providers outside of the [address] [place] [address] since your last visit? Include any pap smears or colon screening. No    Encouraged pt to discuss pt's wishes with spouse/partner/family and bring them in the next appt to follow thru with the Advanced Directive    Fall Risk Assessment, last 12 mths 7/7/2020   Able to walk? Yes   Fall in past 12 months? No       3 most recent PHQ Screens 7/7/2020   Little interest or pleasure in doing things More than half the days   Feeling down, depressed, irritable, or hopeless More than half the days   Total Score PHQ 2 4   Trouble falling or staying asleep, or sleeping too much -   Feeling tired or having little energy -   Poor appetite, weight loss, or overeating -   Feeling bad about yourself - or that you are a failure or have let yourself or your family down -   Trouble concentrating on things such as school, work, reading, or watching TV -   Moving or speaking so slowly that other people could have noticed; or the opposite being so fidgety that others notice -   Thoughts of being better off dead, or hurting yourself in some way -   PHQ 9 Score -   How difficult have these problems made it for you to do your work, take care of your home and get along with others -       Abuse Screening Questionnaire 7/7/2020   Do you ever feel afraid of your partner?  N   Are you in a relationship with someone who physically or mentally threatens you? N   Is it safe for you to go home?  Y       ADL Assessment 7/7/2020   Feeding yourself No Help Needed   Getting from bed to chair No Help Needed   Getting dressed No Help Needed   Bathing or showering No Help Needed   Walk across the room (includes cane/walker) No Help Needed   Using the telphone No Help Needed   Taking your medications No Help Needed   Preparing meals No Help Needed   Managing money (expenses/bills) No Help Needed   Moderately strenuous housework (laundry) No Help Needed   Shopping for personal items (toiletries/medicines) No Help Needed   Shopping for groceries No Help Needed   Driving No Help Needed   Climbing a flight of stairs No Help Needed   Getting to places beyond walking distances No Help Needed

## 2020-07-07 NOTE — PROGRESS NOTES
Brett Ayala, who was evaluated through a synchronous (real-time) audio-video encounter, and/or his healthcare decision maker, is aware that it is a billable service, with coverage as determined by his insurance carrier. He provided verbal consent to proceed: Yes, and patient identification was verified. It was conducted pursuant to the emergency declaration under the [facility], [facility] authority and the [employer] Act. A caregiver was present when appropriate. Ability to conduct physical exam was limited. I was in the office. The patient was at home. PROGRESS NOTE        SUBJECTIVE:  Diagnosis/Chief Complaint: Depression (follow up)      Doing well with mood no, bad day upset angry, C/o HA, only taking seroquel, very angry no specific trigger. Spoke with counselor. Has appt on Fri. Rhonda Boo  Symptoms worse abdo pain sides and top  Suicidal: no  Side affects: yes - ? HA  States taking medications per medicine list.yes - as rx  Abdo pain since DEc 2019. [place] found ulcer Rx him for infection with AB, but pain returned. Patient Active Problem List    Diagnosis Date Noted    Helicobacter pylori infection 04/15/2020    Peripheral vascular disease ([facility] 01/03/2020    Essential hypertension 10/01/2019    Hypercholesteremia 10/01/2019    Eczema 10/01/2019    Gout 10/01/2019    Moderate major depression ([facility] 10/01/2019     Current Outpatient Medications   Medication Sig Dispense Refill    butalbital-acetaminophen-caffeine (FIORICET, ESGIC) -40 mg per tablet Take 1 Tab by mouth every six (6) hours as needed for Headache. 12 Tab 0    varenicline (CHANTIX) 1 mg tablet Take 1 Tab by mouth two (2) times a day for 90 days. 60 Tab 3    venlafaxine (EFFEXOR) 25 mg tablet Take 1 Tab by mouth three (3) times daily. 90 Tab 2    famotidine (PEPCID) 20 mg tablet Take 1 Tab by mouth two (2) times a day.  60 Tab 5    triamcinolone acetonide (KENALOG) 0.1 % topical cream APPLY  CREAM EXTERNALLY TO AFFECTED AREA TWICE DAILY . USE  THIN  LAYER 45 g 3    QUEtiapine (SEROquel) 25 mg tablet 1 in AM 2 in the evening 90 Tab 5    colchicine 0.6 mg tablet Take 1 Tab by mouth daily. 30 Tab 3    allopurinoL (ZYLOPRIM) 100 mg tablet Take 1 Tab by mouth daily. 30 Tab 5    omeprazole (PRILOSEC) 40 mg capsule Take 1 Cap by mouth daily. 30 Cap 5    sildenafil citrate (VIAGRA) 100 mg tablet Take 1 Tab by mouth as needed for Erectile Dysfunction. 5 Tab 5    lisinopril (PRINIVIL, ZESTRIL) 10 mg tablet Take 1 Tab by mouth daily. 30 Tab 5    pravastatin (PRAVACHOL) 40 mg tablet Take 1 Tab by mouth nightly. 30 Tab 5    magnesium 250 mg tab Take  by mouth.  aspirin 81 mg chewable tablet Take 81 mg by mouth daily. No Known Allergies  Past Medical History:   Diagnosis Date    Depression     Hypercholesterolemia     Hypertension     Psychotic disorder ([facility]      History reviewed. No pertinent surgical history. Social History     Tobacco Use    Smoking status: Current Every Day Smoker     Packs/day: 1.00     Types: Cigarettes     Last attempt to quit: 2019     Years since quittin.5    Smokeless tobacco: Never Used   Substance Use Topics    Alcohol use: Not Currently     Alcohol/week: 1.0 standard drinks     Types: 1 Cans of beer per week     Frequency: Never     Comment: occ        OBJECTIVE:    . There were no vitals taken for this visit. WDWN in NAD  Psychiatric: Normal mood, judgement    Reviewed: Medications, allergies, clinical lab test results and imaging results have been reviewed. Any abnormal findings have been addressed. ASSESSMENT:       ICD-10-CM ICD-9-CM    1. Epigastric pain  R10.13 789.06 US ABD COMP      REFERRAL TO GASTROENTEROLOGY   2. Headache, unspecified headache type  R51 784.0 butalbital-acetaminophen-caffeine (FIORICET, ESGIC) -40 mg per tablet   3.  Moderate major depression (HCC)  F32.1 296.22    4. Tobacco abuse  Z72.0 305.1 varenicline (CHANTIX) 1 mg tablet       PLAN    Orders Placed This Encounter    US ABD COMP     Standing Status:   Future     Standing Expiration Date:   8/7/2021    REFERRAL TO GASTROENTEROLOGY     Referral Priority:   Routine     Referral Type:   Consultation     Referral Reason:   Specialty Services Required     Referral Location:   [facility]     Referred to Provider:   Magnolia Bedolla MD    butalbital-acetaminophen-caffeine (FIORICET, ESGIC) -40 mg per tablet     Sig: Take 1 Tab by mouth every six (6) hours as needed for Headache. Dispense:  12 Tab     Refill:  0    varenicline (CHANTIX) 1 mg tablet     Sig: Take 1 Tab by mouth two (2) times a day for 90 days. Dispense:  60 Tab     Refill:  3       Discussed possible side affects, precautions, and drug interactions and possible benefits of the medication(s). Patient was instructed on the limits of making a diagnosis at this visit. Was instructed to call us or go to the emergency room if the symptoms increased or if new symptoms appeared. Follow-up and Dispositions    · Return in about 4 weeks (around 8/4/2020) for routine follow up.

## 2020-07-16 DIAGNOSIS — R10.13 EPIGASTRIC PAIN: ICD-10-CM

## 2020-07-16 NOTE — TELEPHONE ENCOUNTER
Requested Prescriptions     Pending Prescriptions Disp Refills    famotidine (PEPCID) 20 mg tablet 60 Tab 5     Sig: Take 1 Tab by mouth two (2) times a day.

## 2020-07-17 RX ORDER — FAMOTIDINE 20 MG/1
20 TABLET, FILM COATED ORAL 2 TIMES DAILY
Qty: 60 TAB | Refills: 5 | Status: SHIPPED | OUTPATIENT
Start: 2020-07-17 | End: 2021-01-26 | Stop reason: ALTCHOICE

## 2020-07-23 ENCOUNTER — HOSPITAL ENCOUNTER (OUTPATIENT)
Dept: ULTRASOUND IMAGING | Age: 50
Discharge: HOME | End: 2020-07-23
Attending: FAMILY MEDICINE
Payer: COMMERCIAL

## 2020-07-23 DIAGNOSIS — R10.13 EPIGASTRIC PAIN: ICD-10-CM

## 2020-07-23 PROCEDURE — 76700 US EXAM ABDOM COMPLETE: CPT

## 2020-08-04 ENCOUNTER — VIRTUAL VISIT (OUTPATIENT)
Dept: INTERNAL MEDICINE CLINIC | Age: 50
End: 2020-08-04
Payer: COMMERCIAL

## 2020-08-04 DIAGNOSIS — F32.1 MODERATE MAJOR DEPRESSION (HCC): ICD-10-CM

## 2020-08-04 DIAGNOSIS — M10.00 IDIOPATHIC GOUT, UNSPECIFIED CHRONICITY, UNSPECIFIED SITE: ICD-10-CM

## 2020-08-04 DIAGNOSIS — F31.9 BIPOLAR DEPRESSION (HCC): ICD-10-CM

## 2020-08-04 DIAGNOSIS — I10 ESSENTIAL HYPERTENSION: ICD-10-CM

## 2020-08-04 DIAGNOSIS — R10.13 EPIGASTRIC PAIN: Primary | ICD-10-CM

## 2020-08-04 PROCEDURE — 99214 OFFICE O/P EST MOD 30 MIN: CPT | Performed by: FAMILY MEDICINE

## 2020-08-04 RX ORDER — ALLOPURINOL 300 MG/1
300 TABLET ORAL DAILY
Qty: 90 TAB | Refills: 1 | Status: SHIPPED | OUTPATIENT
Start: 2020-08-04 | End: 2021-01-26 | Stop reason: SDUPTHER

## 2020-08-04 RX ORDER — VENLAFAXINE HYDROCHLORIDE 75 MG/1
75 CAPSULE, EXTENDED RELEASE ORAL DAILY
Qty: 30 CAP | Refills: 5
Start: 2020-08-04 | End: 2021-01-26 | Stop reason: ALTCHOICE

## 2020-08-04 NOTE — PROGRESS NOTES
PROGRESS NOTE        SUBJECTIVE:  Diagnosis/Chief Complaint: Depression  Abdominal pain slightly improved. Had US done. Pain better come and goes. Not seen GI. Doing well with mood - managing sl  better  Symptoms makes him tired, dec anger, seeing counseling  Suicidal: no  Side affects: yes - some tiredness  States taking medications per medicine list.yes - as Rx  Parthiz = Psychiatry, has adjusted medications. US showed some fattyness. Continues to have some gout symptoms come and go. Patient Active Problem List    Diagnosis Date Noted    Helicobacter pylori infection 04/15/2020    Peripheral vascular disease ([facility] 01/03/2020    Essential hypertension 10/01/2019    Hypercholesteremia 10/01/2019    Eczema 10/01/2019    Gout 10/01/2019    Moderate major depression ([facility] 10/01/2019     Current Outpatient Medications   Medication Sig Dispense Refill    venlafaxine-SR (EFFEXOR-XR) 75 mg capsule Take 1 Cap by mouth daily. 30 Cap 5    famotidine (PEPCID) 20 mg tablet Take 1 Tab by mouth two (2) times a day. 60 Tab 5    butalbital-acetaminophen-caffeine (FIORICET, ESGIC) -40 mg per tablet Take 1 Tab by mouth every six (6) hours as needed for Headache. 12 Tab 0    varenicline (CHANTIX) 1 mg tablet Take 1 Tab by mouth two (2) times a day for 90 days. 60 Tab 3    triamcinolone acetonide (KENALOG) 0.1 % topical cream APPLY  CREAM EXTERNALLY TO AFFECTED AREA TWICE DAILY . USE  THIN  LAYER 45 g 3    colchicine 0.6 mg tablet Take 1 Tab by mouth daily. 30 Tab 3    allopurinoL (ZYLOPRIM) 100 mg tablet Take 1 Tab by mouth daily. 30 Tab 5    omeprazole (PRILOSEC) 40 mg capsule Take 1 Cap by mouth daily. 30 Cap 5    sildenafil citrate (VIAGRA) 100 mg tablet Take 1 Tab by mouth as needed for Erectile Dysfunction. 5 Tab 5    lisinopril (PRINIVIL, ZESTRIL) 10 mg tablet Take 1 Tab by mouth daily. 30 Tab 5    pravastatin (PRAVACHOL) 40 mg tablet Take 1 Tab by mouth nightly.  30 Tab 5    magnesium 250 mg tab Take  by mouth.  aspirin 81 mg chewable tablet Take 81 mg by mouth daily.  QUEtiapine (SEROquel) 25 mg tablet 1 in AM 2 in the evening 90 Tab 5     No Known Allergies  Social History     Tobacco Use    Smoking status: Current Every Day Smoker     Packs/day: 1.00     Years: 32.00     Pack years: 32.00     Types: Cigarettes     Last attempt to quit: 2019     Years since quittin.6    Smokeless tobacco: Never Used   Substance Use Topics    Alcohol use: Not Currently     Alcohol/week: 1.0 standard drinks     Types: 1 Cans of beer per week     Frequency: Never     Comment: occ        OBJECTIVE:    . There were no vitals taken for this visit. WDWN in NAD  Psychiatric: Normal mood, judgement    Reviewed: Medications, allergies, clinical lab test results and imaging results have been reviewed. Any abnormal findings have been addressed. ASSESSMENT:       ICD-10-CM ICD-9-CM    1. Epigastric pain  R10.13 789.06    2. Idiopathic gout, unspecified chronicity, unspecified site  M10.00 274.9    3. Moderate major depression ([facility])  F32.1 296.22    4. Essential hypertension  I10 401.9    5. Bipolar depression ([facility] [address]  F31.9 296.50    Mood seems to be improving a little continue current medications and counseling    PLAN  See below    Patito Ghosh is a 51 y.o. male who was phone evaluated on 2020. Consent:  He and/or his healthcare decision maker is aware that this patient-initiated Telehealth encounter is a billable service, with coverage as determined by his insurance carrier. He is aware that he may receive a bill and has provided verbal consent to proceed: Yes    I was in the office while conducting this encounter. Assessment & Plan:   Diagnoses and all orders for this visit:    1. Epigastric pain    2. Idiopathic gout, unspecified chronicity, unspecified site    3. Moderate major depression ([address]    4. Essential hypertension    5.  Bipolar depression ([address]    Other orders  -     venlafaxine-SR (EFFEXOR-XR) 75 mg capsule; Take 1 Cap by mouth daily. -     allopurinoL (ZYLOPRIM) 300 mg tablet; Take 1 Tab by mouth daily. Increase allopurinol as above    Current Outpatient Medications   Medication Sig Dispense Refill    venlafaxine-SR (EFFEXOR-XR) 75 mg capsule Take 1 Cap by mouth daily. 30 Cap 5    allopurinoL (ZYLOPRIM) 300 mg tablet Take 1 Tab by mouth daily. 90 Tab 1    famotidine (PEPCID) 20 mg tablet Take 1 Tab by mouth two (2) times a day. 60 Tab 5    butalbital-acetaminophen-caffeine (FIORICET, ESGIC) -40 mg per tablet Take 1 Tab by mouth every six (6) hours as needed for Headache. 12 Tab 0    varenicline (CHANTIX) 1 mg tablet Take 1 Tab by mouth two (2) times a day for 90 days. 60 Tab 3    triamcinolone acetonide (KENALOG) 0.1 % topical cream APPLY  CREAM EXTERNALLY TO AFFECTED AREA TWICE DAILY . USE  THIN  LAYER 45 g 3    colchicine 0.6 mg tablet Take 1 Tab by mouth daily. 30 Tab 3    omeprazole (PRILOSEC) 40 mg capsule Take 1 Cap by mouth daily. 30 Cap 5    sildenafil citrate (VIAGRA) 100 mg tablet Take 1 Tab by mouth as needed for Erectile Dysfunction. 5 Tab 5    lisinopril (PRINIVIL, ZESTRIL) 10 mg tablet Take 1 Tab by mouth daily. 30 Tab 5    pravastatin (PRAVACHOL) 40 mg tablet Take 1 Tab by mouth nightly. 30 Tab 5    magnesium 250 mg tab Take  by mouth.  aspirin 81 mg chewable tablet Take 81 mg by mouth daily.  QUEtiapine (SEROquel) 25 mg tablet 1 in AM 2 in the evening 90 Tab 5         Needs to follow-up with a gastroenterologist.    712  Subjective:      See above  Video not working, phone call only  About 25 minutes spent with him. We discussed the expected course, resolution and complications of the diagnosis(es) in detail. Medication risks, benefits, costs, interactions, and alternatives were discussed as indicated. I advised him to contact the office if his condition worsens, changes or fails to improve as anticipated.  He expressed understanding with the diagnosis(es) and plan. Pursuant to the emergency declaration under the [facility] waiver authority and the [employer] Act, this Virtual  Visit was conducted, with patient's consent, to reduce the patient's risk of exposure to COVID-19 and provide continuity of care for an established patient. Services were provided through a video synchronous discussion virtually to substitute for in-person clinic visit.     Kasey Mcrae MD

## 2020-08-04 NOTE — PROGRESS NOTES
1. Have you been to the ER, urgent care clinic since your last visit? Hospitalized since your last visit? No    2. Have you seen or consulted any other health care providers outside of the [address] [place] [address] since your last visit? Include any pap smears or colon screening.  No     Health Maintenance Due   Topic Date Due    Pneumococcal 0-64 years (1 of 1 - PPSV23) 11/21/1977    Lipid Screen  11/21/1981    DTaP/Tdap/Td series (1 - Tdap) 11/21/1992    Influenza Age 9 to Adult  08/01/2020

## 2020-09-15 ENCOUNTER — HOSPITAL ENCOUNTER (EMERGENCY)
Age: 50
Discharge: HOME | End: 2020-09-15
Attending: EMERGENCY MEDICINE
Payer: COMMERCIAL

## 2020-09-15 ENCOUNTER — HOSPITAL ENCOUNTER (OUTPATIENT)
Dept: CT IMAGING | Age: 50
Discharge: HOME | End: 2020-09-15
Attending: INTERNAL MEDICINE
Payer: COMMERCIAL

## 2020-09-15 VITALS
DIASTOLIC BLOOD PRESSURE: 109 MMHG | HEART RATE: 106 BPM | WEIGHT: 249.56 LBS | SYSTOLIC BLOOD PRESSURE: 175 MMHG | OXYGEN SATURATION: 99 % | BODY MASS INDEX: 34.94 KG/M2 | TEMPERATURE: 98.8 F | HEIGHT: 71 IN | RESPIRATION RATE: 18 BRPM

## 2020-09-15 DIAGNOSIS — R10.13 EPIGASTRIC PAIN: ICD-10-CM

## 2020-09-15 DIAGNOSIS — I10 ESSENTIAL HYPERTENSION: ICD-10-CM

## 2020-09-15 DIAGNOSIS — L02.91 ABSCESS: Primary | ICD-10-CM

## 2020-09-15 LAB — CREAT BLD-MCNC: 1 MG/DL (ref 0.6–1.3)

## 2020-09-15 PROCEDURE — 99282 EMERGENCY DEPT VISIT SF MDM: CPT

## 2020-09-15 PROCEDURE — 74011000636 HC RX REV CODE- 636: Performed by: INTERNAL MEDICINE

## 2020-09-15 PROCEDURE — 82565 ASSAY OF CREATININE: CPT

## 2020-09-15 PROCEDURE — 74011000250 HC RX REV CODE- 250: Performed by: EMERGENCY MEDICINE

## 2020-09-15 PROCEDURE — 74170 CT ABD WO CNTRST FLWD CNTRST: CPT

## 2020-09-15 PROCEDURE — 75810000289 HC I&D ABSCESS SIMP/COMP/MULT

## 2020-09-15 RX ORDER — LIDOCAINE HYDROCHLORIDE AND EPINEPHRINE 20; 10 MG/ML; UG/ML
1.5 INJECTION, SOLUTION INFILTRATION; PERINEURAL
Status: COMPLETED | OUTPATIENT
Start: 2020-09-15 | End: 2020-09-15

## 2020-09-15 RX ORDER — DOXYCYCLINE HYCLATE 100 MG
100 TABLET ORAL 2 TIMES DAILY
Qty: 14 TAB | Refills: 0 | Status: SHIPPED | OUTPATIENT
Start: 2020-09-15 | End: 2020-09-22

## 2020-09-15 RX ORDER — SODIUM CHLORIDE 0.9 % (FLUSH) 0.9 %
10 SYRINGE (ML) INJECTION
Status: COMPLETED | OUTPATIENT
Start: 2020-09-15 | End: 2020-09-15

## 2020-09-15 RX ADMIN — LIDOCAINE HYDROCHLORIDE,EPINEPHRINE BITARTRATE 30 MG: 20; .01 INJECTION, SOLUTION INFILTRATION; PERINEURAL at 14:16

## 2020-09-15 RX ADMIN — IOPAMIDOL 100 ML: 755 INJECTION, SOLUTION INTRAVENOUS at 12:50

## 2020-09-15 RX ADMIN — Medication 10 ML: at 12:50

## 2020-09-15 NOTE — ED PROVIDER NOTES
EMERGENCY DEPARTMENT HISTORY AND PHYSICAL EXAM      Date: 9/15/2020  Patient Name: Abiodun Washington    Please note that this dictation was completed with [email or web address], the computer voice recognition software. Quite often unanticipated grammatical, syntax, homophones, and other interpretive errors are inadvertently transcribed by the computer software. Please disregard these errors. Please excuse any errors that have escaped final proofreading. History of Presenting Illness     Chief Complaint   Patient presents with    Skin Problem     Patient reports he has a boil on his back that he needs lanced. History Provided By: Patient     HPI: Abiodun Washington, 51 y.o. male, presenting the emergency department complaining of abscess. Patient has a sebaceous cyst on his back which he knows about.'s been drained before when it became infected. He states is been going on for several days. Rates the pain is severe, limits him from being able to sit back. Denies any fevers or chills. No nausea vomiting or diarrhea. No other exacerbating relieving factors or associated symptoms at this time. PCP: Paulette Oshea MD    Current Facility-Administered Medications on File Prior to Encounter   Medication Dose Route Frequency Provider Last Rate Last Dose    [COMPLETED] sodium chloride (NS) flush 10 mL  10 mL IntraVENous RAD ONCE Kika Chávez MD   10 mL at 09/15/20 1250    [COMPLETED] iopamidoL (ISOVUE-370) 76 % injection 100 mL  100 mL IntraVENous RAD Brock Orona MD   100 mL at 09/15/20 1250     Current Outpatient Medications on File Prior to Encounter   Medication Sig Dispense Refill    butalbital-acetaminophen-caffeine (FIORICET, ESGIC) -40 mg per tablet TAKE 1 TABLET BY MOUTH EVERY 6 HOURS AS NEEDED FOR HEADACHE 12 Tab 1    triamcinolone acetonide (KENALOG) 0.1 % topical cream APPLY  CREAM EXTERNALLY TO AFFECTED AREA TWICE DAILY . USE  THIN  LAYER 45 g 0    venlafaxine-SR (EFFEXOR-XR) 75 mg capsule Take 1 Cap by mouth daily. 30 Cap 5    allopurinoL (ZYLOPRIM) 300 mg tablet Take 1 Tab by mouth daily. 90 Tab 1    famotidine (PEPCID) 20 mg tablet Take 1 Tab by mouth two (2) times a day. 60 Tab 5    varenicline (CHANTIX) 1 mg tablet Take 1 Tab by mouth two (2) times a day for 90 days. 60 Tab 3    QUEtiapine (SEROquel) 25 mg tablet 1 in AM 2 in the evening 90 Tab 5    colchicine 0.6 mg tablet Take 1 Tab by mouth daily. 30 Tab 3    omeprazole (PRILOSEC) 40 mg capsule Take 1 Cap by mouth daily. 30 Cap 5    sildenafil citrate (VIAGRA) 100 mg tablet Take 1 Tab by mouth as needed for Erectile Dysfunction. 5 Tab 5    lisinopril (PRINIVIL, ZESTRIL) 10 mg tablet Take 1 Tab by mouth daily. 30 Tab 5    pravastatin (PRAVACHOL) 40 mg tablet Take 1 Tab by mouth nightly. 30 Tab 5    magnesium 250 mg tab Take  by mouth.  aspirin 81 mg chewable tablet Take 81 mg by mouth daily. Past History     Past Medical History:  Past Medical History:   Diagnosis Date    Depression     Hypercholesterolemia     Hypertension     Psychotic disorder ([facility] [address]        Past Surgical History:  No past surgical history on file. Family History:  No family history on file. Social History:  Social History     Tobacco Use    Smoking status: Current Every Day Smoker     Packs/day: 1.00     Years: 32.00     Pack years: 32.00     Types: Cigarettes     Last attempt to quit: 2019     Years since quittin.7    Smokeless tobacco: Never Used   Substance Use Topics    Alcohol use: Not Currently     Alcohol/week: 1.0 standard drinks     Types: 1 Cans of beer per week     Frequency: Never     Comment: occ    Drug use: Never       Allergies:  No Known Allergies      Review of Systems   Review of Systems   Constitutional: Negative for chills and fever. Musculoskeletal: Positive for back pain (secondary to abscess). Negative for arthralgias. Skin: Positive for rash and wound. All other systems reviewed and are negative.       Physical Exam   Physical Exam  Vitals signs and nursing note reviewed. Constitutional:       Appearance: He is well-developed. HENT:      Head: Normocephalic and atraumatic. Eyes:      General:         Right eye: No discharge. Left eye: No discharge. Conjunctiva/sclera: Conjunctivae normal.      Pupils: Pupils are equal, round, and reactive to light. Neck:      Musculoskeletal: Normal range of motion and neck supple. Trachea: No tracheal deviation. Cardiovascular:      Rate and Rhythm: Normal rate and regular rhythm. Heart sounds: Normal heart sounds. No murmur. Pulmonary:      Effort: Pulmonary effort is normal. No respiratory distress. Breath sounds: Normal breath sounds. No wheezing or rales. Abdominal:      General: Bowel sounds are normal.      Palpations: Abdomen is soft. Tenderness: There is no abdominal tenderness. There is no guarding or rebound. Musculoskeletal: Normal range of motion. General: No tenderness or deformity. Skin:     General: Skin is warm and dry. Comments: Fluctuant indurated area on the patient's back with some erythema overlying. Area measures about 4 cm in total.  Consistent with infected sebaceous cyst.   Neurological:      Mental Status: He is alert and oriented to person, place, and time. Psychiatric:         Behavior: Behavior normal.         Diagnostic Study Results     Labs -     Recent Results (from the past 12 hour(s))   CREATININE, POC    Collection Time: 09/15/20 12:30 PM   Result Value Ref Range    Creatinine (POC) 1.0 0.6 - 1.3 mg/dL    GFRAA, POC >60 >60 ml/min/1.73m2    GFRNA, POC >60 >60 ml/min/1.73m2       Radiologic Studies -   No orders to display     CT Results  (Last 48 hours)               09/15/20 1250  CT ABD W WO CONT Final result    Impression:  IMPRESSION:        1. No pancreatic lesion or other acute findings identified. 2. Hepatic steatosis.    3. Inflamed appearing sebaceous cyst of the lower back just to the right of   midline. Narrative:  EXAM: CT ABD W WO CONT       INDICATION: Epigastric pain. Evaluate for pancreatic lesion. COMPARISON: Ultrasound 6/23/2020. Yogi Rein CONTRAST: 100 mL of Isovue-370. TECHNIQUE:     Multislice helical CT was performed from the diaphragm to the iliac crest prior   to and during rapid bolus intravenous contrast administration. Oral contrast   administered. Contiguous 5 mm axial images were reconstructed and lung and soft   tissue windows were generated. Coronal and sagittal reformations were generated. CT dose reduction was achieved through use of a standardized protocol tailored   for this examination and automatic exposure control for dose modulation. FINDINGS:       LOWER THORAX: Calcified granuloma left lower lobe with no other significant   abnormality in the incidentally imaged lower chest.       LIVER: Diffusely hypodense with sparing about the gallbladder fossa. No focal   lesion. Hepatic vascular structures opacify normally. BILIARY TREE: Gallbladder is within normal limits. CBD is not dilated. SPLEEN: within normal limits. PANCREAS: No mass or ductal dilatation. ADRENALS: Unremarkable. KIDNEYS: No mass, calculus, or hydronephrosis. STOMACH: Unremarkable. SMALL BOWEL: No dilatation or wall thickening. COLON: No dilatation or wall thickening. APPENDIX: Unremarkable. PERITONEUM: No ascites or pneumoperitoneum. RETROPERITONEUM: No lymphadenopathy or aortic aneurysm. BONES: No destructive bone lesion. ABDOMINAL WALL: Subcutaneous cystic lesion of the lower back just to the right   of midline with surrounding mild inflammation. Tiny fat-containing umbilical   hernia. Otherwise unremarkable. ADDITIONAL COMMENTS: N/A               CXR Results  (Last 48 hours)    None            Medical Decision Making   I am the first provider for this patient.     I reviewed the vital signs, available nursing notes, past medical history, past surgical history, family history and social history. Vital Signs-Reviewed the patient's vital signs. Patient Vitals for the past 12 hrs:   Temp Pulse Resp BP SpO2   09/15/20 1328 98.8 °F (37.1 °C) (!) 106 18 (!) 175/109 99 %         Records Reviewed:   Nursing notes, Prior visits     Provider Notes (Medical Decision Making):   Consistent with sebaceous cyst that is likely infected, will perform I&D. ED Course:   Initial assessment performed. The patients presenting problems have been discussed, and they are in agreement with the care plan formulated and outlined with them. I have encouraged them to ask questions as they arise throughout their visit. Procedure Note - Incision and Drainage:   2:37 PM  Performed by: Jamison Reid DO  Complexity: Complex  Skin prepped with Chlorprep. Sterile field established. Anesthesia achieved using a local infiltration of 10 mL lidocaine 1% with epinephrine. Abscess to back was incised with # 11 blade, and 5mLs of purulent drainage was expressed. Wound probed and irrigated. Area was packed using 0.5 inch iodoform gauze. Sterile dressing applied. Estimated blood loss: < 10cc  The procedure took 1-15 minutes, and pt tolerated well. Hypertensive Education  Patient was hypertensive here in the emergency department. Their symptoms do no appear to be cause by the hypertension. The abnormal vital sign is likely related to the patient's pain, stress, discomfort, or missed dose of medicine. It could also be caused by untreated or undiagnosed hypertension. Patient educated on hypertension including the disease course, complications and common treatments. Patient educated to check blood pressure at home once daily, but do not be alarmed if pressure is high without any symptoms.  Patient educated that no matter their blood pressure if they are having signs of stroke, chest pain, shortness of breath or any other concerning symptom they should return to the ED regardless of their blood pressure. If they are concerned about their blood pressure they are welcome back at anytime for further evaluation. Patient educated to follow up with their primary care provider at the next available appointment to discuss their blood pressure and for a blood pressure recheck. Critical Care Time:   none    Disposition:  DISCHARGE NOTE  Patients results have been reviewed with them. Patient and/or family have verbally conveyed their understanding and agreement of the patient's signs, symptoms, diagnosis, treatment and prognosis and additionally agree to follow up as recommended or return to the Emergency Room should their condition change or have any new concerns prior to their follow-up appointment. Patient verbally agrees with the care-plan and verbally conveys that all of their questions have been answered. Discharge instructions have also been provided to the patient with some educational information regarding their diagnosis as well a list of reasons why they would want to return to the ER prior to their follow-up appointment should their condition change. PLAN:  1. Discharge Medication List as of 9/15/2020  3:17 PM      START taking these medications    Details   doxycycline (VIBRA-TABS) 100 mg tablet Take 1 Tab by mouth two (2) times a day for 7 days. , Normal, Disp-14 Tab,R-0         CONTINUE these medications which have NOT CHANGED    Details   butalbital-acetaminophen-caffeine (FIORICET, ESGIC) -40 mg per tablet TAKE 1 TABLET BY MOUTH EVERY 6 HOURS AS NEEDED FOR HEADACHE, Normal, Disp-12 Tab,R-1      triamcinolone acetonide (KENALOG) 0.1 % topical cream APPLY  CREAM EXTERNALLY TO AFFECTED AREA TWICE DAILY . USE  THIN  LAYER, Normal, Disp-45 g,R-0      venlafaxine-SR (EFFEXOR-XR) 75 mg capsule Take 1 Cap by mouth daily. , No Print, Disp-30 Cap,R-5      allopurinoL (ZYLOPRIM) 300 mg tablet Take 1 Tab by mouth daily. , Normal, Disp-90 Tab,R-1      famotidine (PEPCID) 20 mg tablet Take 1 Tab by mouth two (2) times a day., Normal, Disp-60 Tab,R-5      varenicline (CHANTIX) 1 mg tablet Take 1 Tab by mouth two (2) times a day for 90 days. , Normal, Disp-60 Tab, R-3      QUEtiapine (SEROquel) 25 mg tablet 1 in AM 2 in the evening, Normal, Disp-90 Tab, R-5      colchicine 0.6 mg tablet Take 1 Tab by mouth daily. , Normal, Disp-30 Tab, R-3      omeprazole (PRILOSEC) 40 mg capsule Take 1 Cap by mouth daily. , Normal, Disp-30 Cap, R-5      sildenafil citrate (VIAGRA) 100 mg tablet Take 1 Tab by mouth as needed for Erectile Dysfunction. , Normal, Disp-5 Tab, R-5      lisinopril (PRINIVIL, ZESTRIL) 10 mg tablet Take 1 Tab by mouth daily. , Normal, Disp-30 Tab, R-5      pravastatin (PRAVACHOL) 40 mg tablet Take 1 Tab by mouth nightly., Normal, Disp-30 Tab, R-5      magnesium 250 mg tab Take  by mouth., Historical Med      aspirin 81 mg chewable tablet Take 81 mg by mouth daily. , Historical Med           2. Follow-up Information     Follow up With Specialties Details Why Contact Info    Breezy Santillan MD Family Medicine In 2 weeks regarding your blood pressure [address] [facility] [address] [facility],[address] [address] [phone]      [facility] EMERGENCY DEPT Emergency Medicine  For wound re-check in days [address]  [address] [facility]  [phone]          Return to ED if worse     Diagnosis     Clinical Impression:   1. Abscess    2. Essential hypertension        Attestations:   This note was completed by Mady Carroll DO

## 2020-09-15 NOTE — ED NOTES
[facility] Advised of patient's chief complaint, orders that are completed, any outstanding orders that still need to be completed, and the current treatment plan. All questions addressed prior to assumption of patient care at this time.

## 2020-09-15 NOTE — DISCHARGE INSTRUCTIONS

## 2020-09-17 ENCOUNTER — HOSPITAL ENCOUNTER (EMERGENCY)
Age: 50
Discharge: HOME | End: 2020-09-17
Attending: EMERGENCY MEDICINE
Payer: COMMERCIAL

## 2020-09-17 VITALS
RESPIRATION RATE: 12 BRPM | OXYGEN SATURATION: 100 % | SYSTOLIC BLOOD PRESSURE: 179 MMHG | WEIGHT: 230 LBS | DIASTOLIC BLOOD PRESSURE: 100 MMHG | HEART RATE: 95 BPM | BODY MASS INDEX: 32.2 KG/M2 | HEIGHT: 71 IN | TEMPERATURE: 98.4 F

## 2020-09-17 DIAGNOSIS — Z51.89 WOUND CHECK, ABSCESS: ICD-10-CM

## 2020-09-17 DIAGNOSIS — L72.3 INFECTED SEBACEOUS CYST: Primary | ICD-10-CM

## 2020-09-17 DIAGNOSIS — L08.9 INFECTED SEBACEOUS CYST: Primary | ICD-10-CM

## 2020-09-17 DIAGNOSIS — I10 ESSENTIAL HYPERTENSION: ICD-10-CM

## 2020-09-17 DIAGNOSIS — F17.200 SMOKING ADDICTION: ICD-10-CM

## 2020-09-17 PROCEDURE — 99281 EMR DPT VST MAYX REQ PHY/QHP: CPT

## 2020-09-17 NOTE — ED PROVIDER NOTES
EMERGENCY DEPARTMENT HISTORY AND PHYSICAL EXAM      Date: 9/17/2020  Patient Name: Marilu Bethea    History of Presenting Illness     Chief Complaint   Patient presents with    Wound Check     had radha lanced tuesday came for wound check       History Provided By: Patient    HPI: Marilu Bethea, 51 y.o. male presents ambulatory with his wife to the ED with cc of several days of constant but improving tenderness to the skin of the back that is worse with palpation but for which she has seen improvement following an I&D that was done 2 days ago in this facility. He tells me he has been compliant with his antibiotics. He tells me there has been no fever. He is here for a wound check of an abscess following I&D. There are no other complaints, changes, or physical findings at this time. PCP: Vance Hurtado MD    Current Outpatient Medications   Medication Sig Dispense Refill    doxycycline (VIBRA-TABS) 100 mg tablet Take 1 Tab by mouth two (2) times a day for 7 days. 14 Tab 0    butalbital-acetaminophen-caffeine (FIORICET, ESGIC) -40 mg per tablet TAKE 1 TABLET BY MOUTH EVERY 6 HOURS AS NEEDED FOR HEADACHE 12 Tab 1    triamcinolone acetonide (KENALOG) 0.1 % topical cream APPLY  CREAM EXTERNALLY TO AFFECTED AREA TWICE DAILY . USE  THIN  LAYER 45 g 0    venlafaxine-SR (EFFEXOR-XR) 75 mg capsule Take 1 Cap by mouth daily. 30 Cap 5    allopurinoL (ZYLOPRIM) 300 mg tablet Take 1 Tab by mouth daily. 90 Tab 1    famotidine (PEPCID) 20 mg tablet Take 1 Tab by mouth two (2) times a day. 60 Tab 5    varenicline (CHANTIX) 1 mg tablet Take 1 Tab by mouth two (2) times a day for 90 days. 60 Tab 3    QUEtiapine (SEROquel) 25 mg tablet 1 in AM 2 in the evening 90 Tab 5    colchicine 0.6 mg tablet Take 1 Tab by mouth daily. 30 Tab 3    omeprazole (PRILOSEC) 40 mg capsule Take 1 Cap by mouth daily. 30 Cap 5    sildenafil citrate (VIAGRA) 100 mg tablet Take 1 Tab by mouth as needed for Erectile Dysfunction.  5 Tab 5  lisinopril (PRINIVIL, ZESTRIL) 10 mg tablet Take 1 Tab by mouth daily. 30 Tab 5    pravastatin (PRAVACHOL) 40 mg tablet Take 1 Tab by mouth nightly. 30 Tab 5    magnesium 250 mg tab Take  by mouth.  aspirin 81 mg chewable tablet Take 81 mg by mouth daily. Past History     Past Medical History:  Past Medical History:   Diagnosis Date    Depression     Hypercholesterolemia     Hypertension     Psychotic disorder ([facility] [address]        Past Surgical History:  No past surgical history on file. Family History:  No family history on file. Social History:  Social History     Tobacco Use    Smoking status: Current Every Day Smoker     Packs/day: 1.00     Years: 32.00     Pack years: 32.00     Types: Cigarettes     Last attempt to quit: 2019     Years since quittin.7    Smokeless tobacco: Never Used   Substance Use Topics    Alcohol use: Not Currently     Alcohol/week: 1.0 standard drinks     Types: 1 Cans of beer per week     Frequency: Never     Comment: occ    Drug use: Never       Allergies:  No Known Allergies  Review of Systems   Review of Systems   Constitutional: Negative for fatigue and fever. HENT: Negative for congestion, ear pain and rhinorrhea. Eyes: Negative for pain and redness. Respiratory: Negative for cough and wheezing. Cardiovascular: Negative for chest pain and palpitations. Gastrointestinal: Negative for abdominal pain, nausea and vomiting. Genitourinary: Negative for dysuria, frequency and urgency. Musculoskeletal: Negative for back pain, neck pain and neck stiffness. Skin: Positive for wound (Wound check for abscess of the back). Negative for rash. Neurological: Negative for weakness, light-headedness, numbness and headaches. Physical Exam   Physical Exam  Vitals signs and nursing note reviewed. Constitutional:       General: He is not in acute distress. Appearance: He is well-developed. He is not toxic-appearing.    HENT:      Head: Normocephalic and atraumatic. No right periorbital erythema or left periorbital erythema. Jaw: No trismus. Right Ear: External ear normal.      Left Ear: External ear normal.      Nose: Nose normal.      Mouth/Throat:      Pharynx: Uvula midline. Eyes:      General: No scleral icterus. Conjunctiva/sclera: Conjunctivae normal.      Pupils: Pupils are equal, round, and reactive to light. Neck:      Musculoskeletal: Full passive range of motion without pain and normal range of motion. Cardiovascular:      Rate and Rhythm: Normal rate and regular rhythm. Heart sounds: Normal heart sounds. Pulmonary:      Effort: Pulmonary effort is normal. No tachypnea, accessory muscle usage or respiratory distress. Breath sounds: Normal breath sounds. No decreased breath sounds or wheezing. Abdominal:      Palpations: Abdomen is soft. Abdomen is not rigid. Tenderness: There is no abdominal tenderness. There is no guarding. Musculoskeletal: Normal range of motion. Skin:     Findings: No rash. Comments: Clean dressing removed  Packing removed  Gentle pressure causes a small amount of purulent drainage from an adequate incision  Wound is irrigated with some dislodgment of caseous material  Wound is covered without being packed   Neurological:      Mental Status: He is alert and oriented to person, place, and time. He is not disoriented. GCS: GCS eye subscore is 4. GCS verbal subscore is 5. GCS motor subscore is 6. Cranial Nerves: No cranial nerve deficit. Sensory: No sensory deficit. Psychiatric:         Speech: Speech normal.       Diagnostic Study Results     Labs -   No results found for this or any previous visit (from the past 12 hour(s)). Radiologic Studies -   No orders to display     CT Results  (Last 48 hours)    None        CXR Results  (Last 48 hours)    None        Medical Decision Making   I am the first provider for this patient.     I reviewed the vital signs, available nursing notes, past medical history, past surgical history, family history and social history. Vital Signs-Reviewed the patient's vital signs. Patient Vitals for the past 12 hrs:   Temp Pulse Resp BP SpO2   09/17/20 1112 98.4 °F (36.9 °C) 95 12 (!) 179/100 100 %       Pulse Oximetry Analysis - 100% on RA    Records Reviewed: Nursing Notes, Old Medical Records, Previous Radiology Studies and Previous Laboratory Studies    Provider Notes (Medical Decision Making):   DDx: Abscess, cellulitis, infected sebaceous cyst, wound check    TOBACCO COUNSELING:  Spent 1-5 minutes discussing the risks of smoking and the benefits of smoking cessation as well as the long term sequelae of smoking with the pt who verbalized his understanding. Reviewed strategies for success, including gradually decreasing the number of cigarettes smoked a day. HYPERTENSION COUNSELING:  Patient denies chest pain, headache, shortness of breath. Patient is made aware of their elevated blood pressure and is instructed to follow up this week with their Primary Care for a recheck. Patient is counseled regarding consequences of chronic, uncontrolled hypertension including kidney disease, heart disease, stroke or even death. Patient states their understanding. ED Course:   Initial assessment performed. The patients presenting problems have been discussed, and they are in agreement with the care plan formulated and outlined with them. I have encouraged them to ask questions as they arise throughout their visit. Disposition:  Discharge    PLAN:  1. Discharge Medication List as of 9/17/2020 11:51 AM        2. Follow-up Information     Follow up With Specialties Details Why Contact Info    Douglas Pickett MD Family Medicine Schedule an appointment as soon as possible for a visit As needed [address] [place] [address],[address] [phone]          Return to ED if worse     Diagnosis     Clinical Impression:   1. Infected sebaceous cyst    2. Wound check, abscess    3. Essential hypertension    4. Smoking addiction    2:34 PM  I was personally available for consultation in the emergency department. I have reviewed the chart and agree with the documentation recorded by the SHWETA, including the assessment, treatment plan, and disposition.   Porfirio Robertson MD

## 2020-10-19 ENCOUNTER — TELEPHONE (OUTPATIENT)
Dept: INTERNAL MEDICINE CLINIC | Age: 50
End: 2020-10-19

## 2020-10-19 NOTE — TELEPHONE ENCOUNTER
Pts wife called in reference to needing a refill for her 's losartan. She said that he went to the ER and Dr. Katerina Weiss changed his meds for his bp. He needs losartan 100 mg (take 1 pill a day) to sent to [facility]. Please call her at [phone]. Also, pts wife said that his colchicine was on back order and wanted to know if there was anything else he could get in place of it.

## 2020-10-21 NOTE — TELEPHONE ENCOUNTER
Pt needs a RX for losartan 100mg every day   Pt stated that colchicine is on backorder, is there anything else that can be called in?

## 2020-10-22 RX ORDER — COLCHICINE 0.6 MG/1
0.6 TABLET ORAL DAILY
Qty: 10 TAB | Refills: 5 | Status: SHIPPED | OUTPATIENT
Start: 2020-10-22 | End: 2021-06-25 | Stop reason: SDUPTHER

## 2020-10-22 RX ORDER — LOSARTAN POTASSIUM 100 MG/1
100 TABLET ORAL DAILY
Qty: 30 TAB | Refills: 5 | Status: SHIPPED | OUTPATIENT
Start: 2020-10-22 | End: 2020-10-23 | Stop reason: SDUPTHER

## 2020-10-23 RX ORDER — LOSARTAN POTASSIUM 100 MG/1
100 TABLET ORAL DAILY
Qty: 30 TAB | Refills: 5 | Status: SHIPPED | OUTPATIENT
Start: 2020-10-23 | End: 2021-01-26 | Stop reason: SDUPTHER

## 2020-11-15 ENCOUNTER — HOSPITAL ENCOUNTER (EMERGENCY)
Age: 50
Discharge: HOME | End: 2020-11-16
Attending: EMERGENCY MEDICINE
Payer: COMMERCIAL

## 2020-11-15 ENCOUNTER — APPOINTMENT (OUTPATIENT)
Dept: CT IMAGING | Age: 50
End: 2020-11-15
Attending: EMERGENCY MEDICINE
Payer: COMMERCIAL

## 2020-11-15 DIAGNOSIS — I10 HYPERTENSION, UNSPECIFIED TYPE: ICD-10-CM

## 2020-11-15 DIAGNOSIS — R51.9 NONINTRACTABLE HEADACHE, UNSPECIFIED CHRONICITY PATTERN, UNSPECIFIED HEADACHE TYPE: Primary | ICD-10-CM

## 2020-11-15 DIAGNOSIS — M47.812 OSTEOARTHRITIS OF CERVICAL SPINE, UNSPECIFIED SPINAL OSTEOARTHRITIS COMPLICATION STATUS: ICD-10-CM

## 2020-11-15 PROCEDURE — 96374 THER/PROPH/DIAG INJ IV PUSH: CPT

## 2020-11-15 PROCEDURE — 80053 COMPREHEN METABOLIC PANEL: CPT

## 2020-11-15 PROCEDURE — 96375 TX/PRO/DX INJ NEW DRUG ADDON: CPT

## 2020-11-15 PROCEDURE — 70450 CT HEAD/BRAIN W/O DYE: CPT

## 2020-11-15 PROCEDURE — 72125 CT NECK SPINE W/O DYE: CPT

## 2020-11-15 PROCEDURE — 36415 COLL VENOUS BLD VENIPUNCTURE: CPT

## 2020-11-15 PROCEDURE — 85025 COMPLETE CBC W/AUTO DIFF WBC: CPT

## 2020-11-15 PROCEDURE — 99285 EMERGENCY DEPT VISIT HI MDM: CPT

## 2020-11-15 PROCEDURE — 74011250637 HC RX REV CODE- 250/637: Performed by: EMERGENCY MEDICINE

## 2020-11-15 RX ORDER — BUTALBITAL, ACETAMINOPHEN AND CAFFEINE 50; 325; 40 MG/1; MG/1; MG/1
2 TABLET ORAL
Status: COMPLETED | OUTPATIENT
Start: 2020-11-15 | End: 2020-11-15

## 2020-11-15 RX ORDER — CLONIDINE HYDROCHLORIDE 0.1 MG/1
0.2 TABLET ORAL
Status: COMPLETED | OUTPATIENT
Start: 2020-11-15 | End: 2020-11-15

## 2020-11-15 RX ADMIN — CLONIDINE HYDROCHLORIDE 0.2 MG: 0.1 TABLET ORAL at 23:33

## 2020-11-15 RX ADMIN — BUTALBITAL, ACETAMINOPHEN, AND CAFFEINE 2 TABLET: 50; 325; 40 TABLET ORAL at 23:34

## 2020-11-16 VITALS
SYSTOLIC BLOOD PRESSURE: 106 MMHG | TEMPERATURE: 98.3 F | HEART RATE: 93 BPM | BODY MASS INDEX: 34.44 KG/M2 | DIASTOLIC BLOOD PRESSURE: 82 MMHG | OXYGEN SATURATION: 95 % | WEIGHT: 246.03 LBS | HEIGHT: 71 IN | RESPIRATION RATE: 16 BRPM

## 2020-11-16 LAB
ALBUMIN SERPL-MCNC: 4.2 G/DL (ref 3.5–5)
ALBUMIN/GLOB SERPL: 0.9 {RATIO} (ref 1.1–2.2)
ALP SERPL-CCNC: 74 U/L (ref 45–117)
ALT SERPL-CCNC: 82 U/L (ref 12–78)
ANION GAP SERPL CALC-SCNC: 7 MMOL/L (ref 5–15)
AST SERPL-CCNC: 68 U/L (ref 15–37)
BASOPHILS # BLD: 0 K/UL (ref 0–0.1)
BASOPHILS NFR BLD: 1 % (ref 0–1)
BILIRUB SERPL-MCNC: 0.4 MG/DL (ref 0.2–1)
BUN SERPL-MCNC: 13 MG/DL (ref 6–20)
BUN/CREAT SERPL: 12 (ref 12–20)
CALCIUM SERPL-MCNC: 9.5 MG/DL (ref 8.5–10.1)
CHLORIDE SERPL-SCNC: 104 MMOL/L (ref 97–108)
CO2 SERPL-SCNC: 25 MMOL/L (ref 21–32)
CREAT SERPL-MCNC: 1.12 MG/DL (ref 0.7–1.3)
DIFFERENTIAL METHOD BLD: ABNORMAL
EOSINOPHIL # BLD: 0.1 K/UL (ref 0–0.4)
EOSINOPHIL NFR BLD: 2 % (ref 0–7)
ERYTHROCYTE [DISTWIDTH] IN BLOOD BY AUTOMATED COUNT: 14.6 % (ref 11.5–14.5)
GLOBULIN SER CALC-MCNC: 4.8 G/DL (ref 2–4)
GLUCOSE SERPL-MCNC: 116 MG/DL (ref 65–100)
HCT VFR BLD AUTO: 45 % (ref 36.6–50.3)
HGB BLD-MCNC: 14.9 G/DL (ref 12.1–17)
IMM GRANULOCYTES # BLD AUTO: 0 K/UL (ref 0–0.04)
IMM GRANULOCYTES NFR BLD AUTO: 0 % (ref 0–0.5)
LYMPHOCYTES # BLD: 1.9 K/UL (ref 0.8–3.5)
LYMPHOCYTES NFR BLD: 34 % (ref 12–49)
MCH RBC QN AUTO: 27.7 PG (ref 26–34)
MCHC RBC AUTO-ENTMCNC: 33.1 G/DL (ref 30–36.5)
MCV RBC AUTO: 83.8 FL (ref 80–99)
MONOCYTES # BLD: 0.5 K/UL (ref 0–1)
MONOCYTES NFR BLD: 9 % (ref 5–13)
NEUTS SEG # BLD: 3.1 K/UL (ref 1.8–8)
NEUTS SEG NFR BLD: 54 % (ref 32–75)
NRBC # BLD: 0 K/UL (ref 0–0.01)
NRBC BLD-RTO: 0 PER 100 WBC
PLATELET # BLD AUTO: 278 K/UL (ref 150–400)
PMV BLD AUTO: 11.9 FL (ref 8.9–12.9)
POTASSIUM SERPL-SCNC: 3.7 MMOL/L (ref 3.5–5.1)
PROT SERPL-MCNC: 9 G/DL (ref 6.4–8.2)
RBC # BLD AUTO: 5.37 M/UL (ref 4.1–5.7)
SODIUM SERPL-SCNC: 136 MMOL/L (ref 136–145)
WBC # BLD AUTO: 5.6 K/UL (ref 4.1–11.1)

## 2020-11-16 PROCEDURE — 74011250636 HC RX REV CODE- 250/636: Performed by: EMERGENCY MEDICINE

## 2020-11-16 RX ORDER — NAPROXEN 500 MG/1
500 TABLET ORAL 2 TIMES DAILY WITH MEALS
Qty: 20 TAB | Refills: 0 | Status: SHIPPED | OUTPATIENT
Start: 2020-11-16 | End: 2020-11-26

## 2020-11-16 RX ORDER — DIAZEPAM 10 MG/2ML
2 INJECTION INTRAMUSCULAR
Status: COMPLETED | OUTPATIENT
Start: 2020-11-16 | End: 2020-11-16

## 2020-11-16 RX ORDER — KETOROLAC TROMETHAMINE 30 MG/ML
15 INJECTION, SOLUTION INTRAMUSCULAR; INTRAVENOUS
Status: COMPLETED | OUTPATIENT
Start: 2020-11-16 | End: 2020-11-16

## 2020-11-16 RX ORDER — CYCLOBENZAPRINE HCL 10 MG
10 TABLET ORAL
Qty: 15 TAB | Refills: 0 | Status: SHIPPED | OUTPATIENT
Start: 2020-11-16 | End: 2021-07-14 | Stop reason: ALTCHOICE

## 2020-11-16 RX ADMIN — DIAZEPAM 2 MG: 5 INJECTION, SOLUTION INTRAMUSCULAR; INTRAVENOUS at 01:27

## 2020-11-16 RX ADMIN — KETOROLAC TROMETHAMINE 15 MG: 30 INJECTION, SOLUTION INTRAMUSCULAR at 01:27

## 2020-11-16 RX ADMIN — SODIUM CHLORIDE 500 ML: 900 INJECTION, SOLUTION INTRAVENOUS at 01:29

## 2020-11-16 NOTE — ED PROVIDER NOTES
EMERGENCY DEPARTMENT HISTORY AND PHYSICAL EXAM      Date: 11/15/2020  Patient Name: Gosia Coburn    History of Presenting Illness     Chief Complaint   Patient presents with    Headache     reports headache for several weeks, recently had BP meds by PCP       History Provided By: Patient    HPI: Gosia Coburn, 51 y.o. male with PMHx significant for hypertension, hyperlipidemia, depression and smoking addiction presents to the ED with chief complaint of a headache that has been intermittent since August 2020. This particular headache for which she is being evaluated tonight has been going on constantly for a week. He states it is a aching, 8 out of 10 pain that radiates from the top of his head down to the back of his neck specifically on the left. Pain is worse when he raises his left arm. He denies any recent trauma. His pain is moderate in intensity. He denies nausea, vomiting, fever, chills, vision changes. Been seen by his primary care doctor for this type of headache and was prescribed Fioricet. He was seen at [facility] yesterday and started on hydrochlorothiazide for elevated blood pressure. He was also given an ointment for presumed hordeolum of his right lower eyelid which has been swollen since yesterday. He denies any chest pain, shortness of breath, dysuria, hematuria, numbness, tingling, weakness. PCP: Jasiel Green MD    No current facility-administered medications on file prior to encounter. Current Outpatient Medications on File Prior to Encounter   Medication Sig Dispense Refill    losartan (COZAAR) 100 mg tablet Take 1 Tab by mouth daily. 30 Tab 5    colchicine (Colcrys) 0.6 mg tablet Take 1 Tab by mouth daily.  As needed for gout 10 Tab 5    butalbital-acetaminophen-caffeine (FIORICET, ESGIC) -40 mg per tablet TAKE 1 TABLET BY MOUTH EVERY 6 HOURS AS NEEDED FOR HEADACHE 12 Tab 1    triamcinolone acetonide (KENALOG) 0.1 % topical cream APPLY  CREAM EXTERNALLY TO AFFECTED AREA TWICE DAILY . USE  THIN  LAYER 45 g 0    venlafaxine-SR (EFFEXOR-XR) 75 mg capsule Take 1 Cap by mouth daily. 30 Cap 5    allopurinoL (ZYLOPRIM) 300 mg tablet Take 1 Tab by mouth daily. 90 Tab 1    famotidine (PEPCID) 20 mg tablet Take 1 Tab by mouth two (2) times a day. 60 Tab 5    QUEtiapine (SEROquel) 25 mg tablet 1 in AM 2 in the evening 90 Tab 5    omeprazole (PRILOSEC) 40 mg capsule Take 1 Cap by mouth daily. 30 Cap 5    sildenafil citrate (VIAGRA) 100 mg tablet Take 1 Tab by mouth as needed for Erectile Dysfunction. 5 Tab 5    pravastatin (PRAVACHOL) 40 mg tablet Take 1 Tab by mouth nightly. 30 Tab 5    magnesium 250 mg tab Take  by mouth.  aspirin 81 mg chewable tablet Take 81 mg by mouth daily. Past History     Past Medical History:  Past Medical History:   Diagnosis Date    Depression     Hypercholesterolemia     Hypertension     Psychotic disorder ([facility] [address]        Past Surgical History:  No past surgical history on file. Family History:  No family history on file. Social History:  Social History     Tobacco Use    Smoking status: Current Every Day Smoker     Packs/day: 1.00     Years: 32.00     Pack years: 32.00     Types: Cigarettes     Last attempt to quit: 2019     Years since quittin.9    Smokeless tobacco: Never Used   Substance Use Topics    Alcohol use: Not Currently     Alcohol/week: 1.0 standard drinks     Types: 1 Cans of beer per week     Frequency: Never     Comment: occ    Drug use: Never       Allergies:  No Known Allergies      Review of Systems   Review of Systems   Constitutional: Negative for chills and fever. HENT: Negative for congestion, ear pain, rhinorrhea and sore throat. Eyes: Negative. Respiratory: Negative for cough, chest tightness, shortness of breath and wheezing. Cardiovascular: Negative for chest pain and palpitations. Gastrointestinal: Negative for abdominal pain, diarrhea, nausea and vomiting. Genitourinary: Negative for dysuria, flank pain and hematuria. Musculoskeletal: Positive for neck pain. Negative for back pain and myalgias. Skin: Negative for rash and wound. Neurological: Positive for headaches. Negative for dizziness, syncope, weakness and light-headedness. Psychiatric/Behavioral: Negative for confusion. The patient is not nervous/anxious. All other systems reviewed and are negative. Physical Exam    General appearance - well nourished, well appearing, and in no distress  Eyes - pupils equal and reactive, extraocular eye movements intact  ENT - mucous membranes moist, pharynx normal without lesions  Neck - supple, no significant adenopathy; tender to palpation left paracervical musculature  Chest - clear to auscultation, no wheezes, rales or rhonchi; non-tender to palpation  Heart - normal rate and regular rhythm, S1 and S2 normal, no murmurs noted  Abdomen - soft, nontender, nondistended, no masses or organomegaly  Musculoskeletal - no joint tenderness, deformity or swelling; normal ROM  Extremities - peripheral pulses normal, no pedal edema  Skin - normal coloration and turgor, no rashes  Neurological - alert, oriented x3, normal speech, no focal findings or movement disorder noted    Diagnostic Study Results     Labs -     Recent Results (from the past 100 hour(s))   METABOLIC PANEL, COMPREHENSIVE    Collection Time: 11/15/20 11:55 PM   Result Value Ref Range    Sodium 136 136 - 145 mmol/L    Potassium 3.7 3.5 - 5.1 mmol/L    Chloride 104 97 - 108 mmol/L    CO2 25 21 - 32 mmol/L    Anion gap 7 5 - 15 mmol/L    Glucose 116 (H) 65 - 100 mg/dL    BUN 13 6 - 20 MG/DL    Creatinine 1.12 0.70 - 1.30 MG/DL    BUN/Creatinine ratio 12 12 - 20      GFR est AA >60 >60 ml/min/1.73m2    GFR est non-AA >60 >60 ml/min/1.73m2    Calcium 9.5 8.5 - 10.1 MG/DL    Bilirubin, total 0.4 0.2 - 1.0 MG/DL    ALT (SGPT) 82 (H) 12 - 78 U/L    AST (SGOT) 68 (H) 15 - 37 U/L    Alk.  phosphatase 74 45 - 117 U/L    Protein, total 9.0 (H) 6.4 - 8.2 g/dL    Albumin 4.2 3.5 - 5.0 g/dL    Globulin 4.8 (H) 2.0 - 4.0 g/dL    A-G Ratio 0.9 (L) 1.1 - 2.2     CBC WITH AUTOMATED DIFF    Collection Time: 11/15/20 11:55 PM   Result Value Ref Range    WBC 5.6 4.1 - 11.1 K/uL    RBC 5.37 4.10 - 5.70 M/uL    HGB 14.9 12.1 - 17.0 g/dL    HCT 45.0 36.6 - 50.3 %    MCV 83.8 80.0 - 99.0 FL    MCH 27.7 26.0 - 34.0 PG    MCHC 33.1 30.0 - 36.5 g/dL    RDW 14.6 (H) 11.5 - 14.5 %    PLATELET 057 014 - 695 K/uL    MPV 11.9 8.9 - 12.9 FL    NRBC 0.0 0  WBC    ABSOLUTE NRBC 0.00 0.00 - 0.01 K/uL    NEUTROPHILS 54 32 - 75 %    LYMPHOCYTES 34 12 - 49 %    MONOCYTES 9 5 - 13 %    EOSINOPHILS 2 0 - 7 %    BASOPHILS 1 0 - 1 %    IMMATURE GRANULOCYTES 0 0.0 - 0.5 %    ABS. NEUTROPHILS 3.1 1.8 - 8.0 K/UL    ABS. LYMPHOCYTES 1.9 0.8 - 3.5 K/UL    ABS. MONOCYTES 0.5 0.0 - 1.0 K/UL    ABS. EOSINOPHILS 0.1 0.0 - 0.4 K/UL    ABS. BASOPHILS 0.0 0.0 - 0.1 K/UL    ABS. IMM. GRANS. 0.0 0.00 - 0.04 K/UL    DF AUTOMATED         Radiologic Studies -   CT HEAD WO CONT   Final Result   IMPRESSION:       No acute intracranial abnormality. CT SPINE CERV WO CONT   Final Result   IMPRESSION:   No acute abnormality. Degenerative disc change at C6-7 with mild to moderate   spinal canal stenosis. CT Results  (Last 48 hours)    None        CXR Results  (Last 48 hours)    None            Medical Decision Making   I am the first provider for this patient. I reviewed the vital signs, available nursing notes, past medical history, past surgical history, family history and social history. Vital Signs-Reviewed the patient's vital signs.   Patient Vitals for the past 12 hrs:   Temp Pulse Resp BP SpO2   11/15/20 2322 98.7 °F (37.1 °C) (!) 105 18 (!) 138/100 98 %   11/15/20 2307 98.1 °F (36.7 °C) (!) 108 16 (!) 147/100 100 %           Records Reviewed: Nursing Notes and Old Medical Records    Provider Notes (Medical Decision Making):   Differential diagnosis: Hypertension, tension, cluster, migraine headache  Patient has been having intermittent headaches for the past 2 to 3 months. As no imaging has yet been performed, will obtain head CT and cervical spine CT. Will treat with Fioricet and check basic labs. Clonidine was ordered for patient by different provider for initial triage blood pressure of 147/100 prior to my evaluation. Will monitor blood pressure closely. ED Course:   Initial assessment performed. The patients presenting problems have been discussed, and they are in agreement with the care plan formulated and outlined with them. I have encouraged them to ask questions as they arise throughout their visit. Progress Notes:  ED Course as of Nov 19 1209   Mon Nov 16, 2020   0117 He does not show any evidence of acute abnormality. There is no mass or bleeding. CT of cervical spine shows degenerative changes at C6-C7. Patient still complaining of a headache. Will treat with Toradol and Valium. Patient still mildly tachycardic. Will give 500 cc bolus of normal saline. Rechecked temperature. It is 98.7.    [AO]   0240 Patient is feeling much better. Blood pressure has improved and heart rate is down to low 90s. Headache has almost completely resolved. Will discharge with Valium, Naprosyn, and encourage follow-up with PCP for blood pressure management, neurology for chronic headaches, and spine specialist for possible MRI of cervical spine given degenerative changes seen on CT and and left cervical neck tenderness.    [AO]      ED Course User Index  [AO] Jennifer Astudillo MD       Disposition:  AR home      PLAN:  1. Discharge Medication List as of 11/16/2020  2:47 AM      START taking these medications    Details   naproxen (Naprosyn) 500 mg tablet Take 1 Tab by mouth two (2) times daily (with meals) for 10 days. , Normal, Disp-20 Tab,R-0      cyclobenzaprine (FLEXERIL) 10 mg tablet Take 1 Tab by mouth three (3) times daily as needed for Muscle Spasm(s). , Normal, Disp-15 Tab,R-0         CONTINUE these medications which have NOT CHANGED    Details   losartan (COZAAR) 100 mg tablet Take 1 Tab by mouth daily. , Normal, Disp-30 Tab,R-5      colchicine (Colcrys) 0.6 mg tablet Take 1 Tab by mouth daily. As needed for gout, Normal, Disp-10 Tab,R-5      butalbital-acetaminophen-caffeine (FIORICET, ESGIC) -40 mg per tablet TAKE 1 TABLET BY MOUTH EVERY 6 HOURS AS NEEDED FOR HEADACHE, Normal, Disp-12 Tab,R-1      triamcinolone acetonide (KENALOG) 0.1 % topical cream APPLY  CREAM EXTERNALLY TO AFFECTED AREA TWICE DAILY . USE  THIN  LAYER, Normal, Disp-45 g,R-0      venlafaxine-SR (EFFEXOR-XR) 75 mg capsule Take 1 Cap by mouth daily. , No Print, Disp-30 Cap,R-5      allopurinoL (ZYLOPRIM) 300 mg tablet Take 1 Tab by mouth daily. , Normal, Disp-90 Tab,R-1      famotidine (PEPCID) 20 mg tablet Take 1 Tab by mouth two (2) times a day., Normal, Disp-60 Tab,R-5      QUEtiapine (SEROquel) 25 mg tablet 1 in AM 2 in the evening, Normal, Disp-90 Tab, R-5      omeprazole (PRILOSEC) 40 mg capsule Take 1 Cap by mouth daily. , Normal, Disp-30 Cap, R-5      sildenafil citrate (VIAGRA) 100 mg tablet Take 1 Tab by mouth as needed for Erectile Dysfunction. , Normal, Disp-5 Tab, R-5      pravastatin (PRAVACHOL) 40 mg tablet Take 1 Tab by mouth nightly., Normal, Disp-30 Tab, R-5      magnesium 250 mg tab Take  by mouth., Historical Med      aspirin 81 mg chewable tablet Take 81 mg by mouth daily. , Historical Med           2.    Follow-up Information     Follow up With Specialties Details Why Contact Info    Mikel Sabillon MD Family Medicine Schedule an appointment as soon as possible for a visit   [facility]  [phone]      Mary Anne Calabrese MD Orthopedic Surgery Schedule an appointment as soon as possible for a visit  As needed [address]  [address]  [address] [address] [phone]      Jordan Angelucci, MD Neurology Schedule an appointment as soon as possible for a visit  If symptoms worsen [address] [facility] [address]  [address] [phone]          Return to ED if worse     Diagnosis     Clinical Impression:   1. Nonintractable headache, unspecified chronicity pattern, unspecified headache type    2. Hypertension, unspecified type    3.  Osteoarthritis of cervical spine, unspecified spinal osteoarthritis complication status

## 2020-11-16 NOTE — ED NOTES
Patient was provided with discharge instructions. Instructions and any medications were reviewed with the patient &/or family by Dr. Lizbeth Kumar. Questions and concerns addressed by the provider. Patient discharged in stable condition via ambulation and was unaccompanied.

## 2020-12-17 ENCOUNTER — DOCUMENTATION ONLY (OUTPATIENT)
Dept: INTERNAL MEDICINE CLINIC | Age: 50
End: 2020-12-17

## 2020-12-17 ENCOUNTER — TELEPHONE (OUTPATIENT)
Dept: INTERNAL MEDICINE CLINIC | Age: 50
End: 2020-12-17

## 2020-12-17 NOTE — TELEPHONE ENCOUNTER
12/17/20 Plan called @ [phone] PA completed with Purnima OLIVIER for Omeprazole 40 mg dr capsules. Case ID: [id number]. Approved 12/17/20 thru 12/17/21. Approval called to [facility] ,,OLIMPIA state it went thru. OLIMPIA and Bhupendra Munguia was advised to process colchicine capsules as on formulary,both verify understanding.

## 2020-12-17 NOTE — PROGRESS NOTES
Received approval for request for PA of Omeprazole 40mg caps - 12/17/2020 - 12/17/2021  Clau Ramos, LPN  60/77/2794  0:41 PM

## 2021-01-11 ENCOUNTER — TELEPHONE (OUTPATIENT)
Dept: INTERNAL MEDICINE CLINIC | Age: 51
End: 2021-01-11

## 2021-01-15 RX ORDER — HYDROCHLOROTHIAZIDE 25 MG/1
12.5 TABLET ORAL DAILY
Qty: 60 TAB | Refills: 2 | Status: SHIPPED | OUTPATIENT
Start: 2021-01-15 | End: 2021-07-14 | Stop reason: SDUPTHER

## 2021-01-15 NOTE — TELEPHONE ENCOUNTER
Patient states that he takes Hydrochlorothiazide 25mg 1/2 tablet daily - prescribed by Dr Prosper Sloan from the ER 2 months ago - needs this sent in to [address] [facility] if he is to continue taking this medication  Teresa Rust LPN  9/39/7558  0:68 PM

## 2021-01-15 NOTE — TELEPHONE ENCOUNTER
Patient's wife calling to get hydrocholorothiazide called in to [address] [place] [address],[address]

## 2021-01-26 ENCOUNTER — OFFICE VISIT (OUTPATIENT)
Dept: INTERNAL MEDICINE CLINIC | Age: 51
End: 2021-01-26
Payer: COMMERCIAL

## 2021-01-26 VITALS
OXYGEN SATURATION: 98 % | WEIGHT: 249 LBS | RESPIRATION RATE: 16 BRPM | BODY MASS INDEX: 34.86 KG/M2 | DIASTOLIC BLOOD PRESSURE: 85 MMHG | TEMPERATURE: 97.6 F | HEIGHT: 71 IN | SYSTOLIC BLOOD PRESSURE: 134 MMHG | HEART RATE: 88 BPM

## 2021-01-26 DIAGNOSIS — K21.9 GASTROESOPHAGEAL REFLUX DISEASE, UNSPECIFIED WHETHER ESOPHAGITIS PRESENT: ICD-10-CM

## 2021-01-26 DIAGNOSIS — E78.00 HYPERCHOLESTEREMIA: ICD-10-CM

## 2021-01-26 DIAGNOSIS — Z72.0 TOBACCO ABUSE: ICD-10-CM

## 2021-01-26 DIAGNOSIS — R91.8 ABNORMAL CT SCAN OF LUNG: ICD-10-CM

## 2021-01-26 DIAGNOSIS — M10.071 ACUTE IDIOPATHIC GOUT OF RIGHT FOOT: ICD-10-CM

## 2021-01-26 DIAGNOSIS — R10.13 EPIGASTRIC PAIN: ICD-10-CM

## 2021-01-26 DIAGNOSIS — M79.671 FOOT PAIN, RIGHT: ICD-10-CM

## 2021-01-26 DIAGNOSIS — I10 ESSENTIAL HYPERTENSION: Primary | ICD-10-CM

## 2021-01-26 PROBLEM — K76.0 FATTY LIVER: Status: ACTIVE | Noted: 2021-01-26

## 2021-01-26 PROCEDURE — 99214 OFFICE O/P EST MOD 30 MIN: CPT | Performed by: FAMILY MEDICINE

## 2021-01-26 RX ORDER — PRAVASTATIN SODIUM 40 MG/1
40 TABLET ORAL
Qty: 30 TAB | Refills: 11 | Status: SHIPPED | OUTPATIENT
Start: 2021-01-26 | End: 2021-03-09

## 2021-01-26 RX ORDER — DULOXETIN HYDROCHLORIDE 20 MG/1
20 CAPSULE, DELAYED RELEASE ORAL DAILY
COMMUNITY

## 2021-01-26 RX ORDER — QUETIAPINE FUMARATE 50 MG/1
100 TABLET, EXTENDED RELEASE ORAL
COMMUNITY
Start: 2020-12-28 | End: 2021-12-30 | Stop reason: ALTCHOICE

## 2021-01-26 RX ORDER — OMEPRAZOLE 40 MG/1
40 CAPSULE, DELAYED RELEASE ORAL DAILY
Qty: 30 CAP | Refills: 5 | Status: SHIPPED | OUTPATIENT
Start: 2021-01-26 | End: 2021-07-14 | Stop reason: SDUPTHER

## 2021-01-26 RX ORDER — VARENICLINE TARTRATE 25 MG
KIT ORAL
Qty: 1 DOSE PACK | Refills: 0 | Status: SHIPPED | OUTPATIENT
Start: 2021-01-26 | End: 2021-06-21 | Stop reason: ALTCHOICE

## 2021-01-26 RX ORDER — VARENICLINE TARTRATE 1 MG/1
1 TABLET, FILM COATED ORAL 2 TIMES DAILY
Qty: 60 TAB | Refills: 3 | Status: SHIPPED | OUTPATIENT
Start: 2021-01-26 | End: 2021-04-26

## 2021-01-26 RX ORDER — ALLOPURINOL 300 MG/1
300 TABLET ORAL DAILY
Qty: 90 TAB | Refills: 1 | Status: SHIPPED | OUTPATIENT
Start: 2021-01-26 | End: 2021-06-22

## 2021-01-26 RX ORDER — LOSARTAN POTASSIUM 100 MG/1
100 TABLET ORAL DAILY
Qty: 30 TAB | Refills: 11 | Status: SHIPPED | OUTPATIENT
Start: 2021-01-26 | End: 2021-07-14 | Stop reason: SDUPTHER

## 2021-01-26 RX ORDER — DICLOFENAC SODIUM 50 MG/1
50 TABLET, DELAYED RELEASE ORAL 2 TIMES DAILY
Qty: 60 TAB | Refills: 5 | Status: SHIPPED | OUTPATIENT
Start: 2021-01-26 | End: 2021-07-14 | Stop reason: ALTCHOICE

## 2021-01-26 NOTE — PROGRESS NOTES
Chief Complaint   Patient presents with    Foot Pain     top outside of L/R foot pain    Hypertension     after taking BP meds pt is dizzy       Health Maintenance reviewed. I have reviewed the patient's medical history in detail and updated the computerized patient record. Patient has not been out of the country in (10-11 months), NO diarrhea, NO cough, NO chest conjestion, NO temp. Pt has not been around anyone with these symptoms. 1. Have you been to the ER, urgent care clinic since your last visit? Hospitalized since your last visit? yes    2. Have you seen or consulted any other health care providers outside of the [address] [place] [address] since your last visit? Include any pap smears or colon screening. yes      Encouraged pt to discuss pt's wishes with spouse/partner/family and bring them in the next appt to follow thru with the Advanced Directive    Fall Risk Assessment, last 12 mths 1/26/2021   Able to walk? Yes   Fall in past 12 months? 0   Do you feel unsteady?  0   Are you worried about falling 0       3 most recent PHQ Screens 1/26/2021   Little interest or pleasure in doing things More than half the days   Feeling down, depressed, irritable, or hopeless More than half the days   Total Score PHQ 2 4   Trouble falling or staying asleep, or sleeping too much -   Feeling tired or having little energy -   Poor appetite, weight loss, or overeating -   Feeling bad about yourself - or that you are a failure or have let yourself or your family down -   Trouble concentrating on things such as school, work, reading, or watching TV -   Moving or speaking so slowly that other people could have noticed; or the opposite being so fidgety that others notice -   Thoughts of being better off dead, or hurting yourself in some way -   PHQ 9 Score -   How difficult have these problems made it for you to do your work, take care of your home and get along with others -       Abuse Screening Questionnaire 1/26/2021   Do you ever feel afraid of your partner? N   Are you in a relationship with someone who physically or mentally threatens you? N   Is it safe for you to go home?  Y       ADL Assessment 1/26/2021   Feeding yourself No Help Needed   Getting from bed to chair No Help Needed   Getting dressed No Help Needed   Bathing or showering No Help Needed   Walk across the room (includes cane/walker) No Help Needed   Using the telphone No Help Needed   Taking your medications No Help Needed   Preparing meals No Help Needed   Managing money (expenses/bills) No Help Needed   Moderately strenuous housework (laundry) Help Needed   Shopping for personal items (toiletries/medicines) Help Needed   Shopping for groceries Help Needed   Driving Help Needed   Climbing a flight of stairs No Help Needed   Getting to places beyond walking distances Help Needed

## 2021-01-26 NOTE — PROGRESS NOTES
Subjective:     Helder Bender is a 53 y.o. male who presents for follow up of hypertension, hyperlipidemia and depression. Seeing psych, angry all the time Sx worsen. Meds adjusted. New concerns: takes BP meds in AM and they make him dizzy  Right foot pain mid foot x yrs come and go getting worse. Both feet can be affected. Lateral edges hurt more. Hx of gout  Taking gout meds but ran out of statin. Diet and Lifestyle: smoker 3 cigs daily    Cardiovascular ROS:   Reports taking blood pressure medications without side affects. No complaints of exertional chest pain, excessive shortness of breath or focal weakness. Minimal swelling in lower legs + dizziness with standing after takes BP meds      Review of Systems, additional:  Pertinent items are noted in HPI.       Patient Active Problem List    Diagnosis Date Noted    Fatty liver     Helicobacter pylori infection 04/15/2020    Peripheral vascular disease ([facility] [address] 2020    Essential hypertension 10/01/2019    Hypercholesteremia 10/01/2019    Eczema 10/01/2019    Gout 10/01/2019    Moderate major depression ([facility] [address] 10/01/2019     No Known Allergies  Past Medical History:   Diagnosis Date    Depression     Hypercholesterolemia     Hypertension     Psychotic disorder ([facility] [address]      Social History     Tobacco Use    Smoking status: Current Every Day Smoker     Packs/day: 1.00     Years: 32.00     Pack years: 32.00     Types: Cigarettes     Last attempt to quit: 2019     Years since quittin.1    Smokeless tobacco: Never Used   Substance Use Topics    Alcohol use: Not Currently     Alcohol/week: 1.0 standard drinks     Types: 1 Cans of beer per week     Frequency: Never     Comment: occ        Lab Results   Component Value Date/Time    WBC 5.6 11/15/2020 11:55 PM    HGB 14.9 11/15/2020 11:55 PM    HCT 45.0 11/15/2020 11:55 PM    PLATELET 063  11:55 PM    MCV 83.8 11/15/2020 11:55 PM     No results found for: CHOL, CHOLPOCT, HDL, LDL, LDLC, LDLCPOC, LDLCEXT, TRIGL, TGLPOCT, CHHD, CHHDX  Lab Results   Component Value Date/Time    ALT (SGPT) 82 (H) 11/15/2020 11:55 PM    Alk. phosphatase 74 11/15/2020 11:55 PM    Bilirubin, total 0.4 11/15/2020 11:55 PM    Albumin 4.2 11/15/2020 11:55 PM    Protein, total 9.0 (H) 11/15/2020 11:55 PM    PLATELET 427 40/48/3049 11:55 PM     Lab Results   Component Value Date/Time    GFR est non-AA >60 11/15/2020 11:55 PM    GFRNA, POC >60 09/15/2020 12:30 PM    GFR est AA >60 11/15/2020 11:55 PM    GFRAA, POC >60 09/15/2020 12:30 PM    Creatinine 1.12 11/15/2020 11:55 PM    Creatinine (POC) 1.0 09/15/2020 12:30 PM    BUN 13 11/15/2020 11:55 PM    Sodium 136 11/15/2020 11:55 PM    Potassium 3.7 11/15/2020 11:55 PM    Chloride 104 11/15/2020 11:55 PM    CO2 25 11/15/2020 11:55 PM    Magnesium 1.9 01/03/2020 12:07 PM     Lab Results   Component Value Date/Time    Glucose 116 (H) 11/15/2020 11:55 PM             Objective:     Physical exam significant for the following:     WDWNB male NAD    Visit Vitals  /85 (BP 1 Location: Left arm, BP Patient Position: Sitting)   Pulse 88   Temp 97.6 °F (36.4 °C) (Temporal)   Resp 16   Ht 5' 11\" (1.803 m)   Wt 249 lb (112.9 kg)   SpO2 98%   BMI 34.73 kg/m²     WD WN male NAD  Heart RRR without murmers clicks or rubs  Lungs CTA  Abdo soft nontender  Ext no edema  Feet No lesion N/V intact  . Assessment/Plan:     hypertension well controlled, stable, take BP meds hs, hyperlipidemia control uncertain, patient poorly compliant, needs to quit smoking. ICD-10-CM ICD-9-CM    1. Essential hypertension  R42 472.9 METABOLIC PANEL, COMPREHENSIVE   2. Foot pain, right  M79.671 729.5 REFERRAL TO PODIATRY      DUPLEX LOW EXT ARTERIES WITH JAVED      diclofenac EC (VOLTAREN) 50 mg EC tablet   3. Hypercholesteremia  E78.00 272.0 pravastatin (PRAVACHOL) 40 mg tablet      LIPID PANEL   4.  Tobacco abuse  Z72.0 305.1 varenicline (CHANTIX STARTER BARBER) 0.5 mg (11)- 1 mg (42) DsPk varenicline (CHANTIX) 1 mg tablet   5. Acute idiopathic gout of right foot  M10.071 274.01 URIC ACID   6. Abnormal CT scan of lung  R91.8 793.19 CT CHEST WO CONT   7. Epigastric pain  R10.13 789.06    8. Gastroesophageal reflux disease, unspecified whether esophagitis present  K21.9 530.81 omeprazole (PRILOSEC) 40 mg capsule     The patient was counseled on the dangers of tobacco use, and was advised to quit. Reviewed strategies to maximize success, including pharmacotherapy (Chantix). Current Outpatient Medications   Medication Sig Dispense Refill    pravastatin (PRAVACHOL) 40 mg tablet Take 1 Tab by mouth nightly. 30 Tab 11    varenicline (CHANTIX STARTER BARBER) 0.5 mg (11)- 1 mg (42) DsPk Use as directed 1 Dose Pack 0    varenicline (CHANTIX) 1 mg tablet Take 1 Tab by mouth two (2) times a day for 90 days. 60 Tab 3    diclofenac EC (VOLTAREN) 50 mg EC tablet Take 1 Tab by mouth two (2) times a day. As needed 60 Tab 5    losartan (COZAAR) 100 mg tablet Take 1 Tab by mouth daily. 30 Tab 11    allopurinoL (ZYLOPRIM) 300 mg tablet Take 1 Tab by mouth daily. 90 Tab 1    omeprazole (PRILOSEC) 40 mg capsule Take 1 Cap by mouth daily. 30 Cap 5    hydroCHLOROthiazide (HYDRODIURIL) 25 mg tablet Take 0.5 Tabs by mouth daily. 60 Tab 2    cyclobenzaprine (FLEXERIL) 10 mg tablet Take 1 Tab by mouth three (3) times daily as needed for Muscle Spasm(s). 15 Tab 0    colchicine (Colcrys) 0.6 mg tablet Take 1 Tab by mouth daily. As needed for gout 10 Tab 5    butalbital-acetaminophen-caffeine (FIORICET, ESGIC) -40 mg per tablet TAKE 1 TABLET BY MOUTH EVERY 6 HOURS AS NEEDED FOR HEADACHE 12 Tab 1    triamcinolone acetonide (KENALOG) 0.1 % topical cream APPLY  CREAM EXTERNALLY TO AFFECTED AREA TWICE DAILY . USE  THIN  LAYER 45 g 0    sildenafil citrate (VIAGRA) 100 mg tablet Take 1 Tab by mouth as needed for Erectile Dysfunction. 5 Tab 5    magnesium 250 mg tab Take  by mouth.       aspirin 81 mg chewable tablet Take 81 mg by mouth daily.  DULoxetine (CYMBALTA) 20 mg capsule Take 20 mg by mouth daily.  QUEtiapine SR (SEROquel XR) 50 mg sr tablet Take 50 mg by mouth nightly. Discussed possible side affects, precautions, and drug interactions and possible benefits of the medication(s). Follow-up and Dispositions    · Return in about 10 weeks (around 4/6/2021) for routine follow up.

## 2021-01-27 ENCOUNTER — OFFICE VISIT (OUTPATIENT)
Dept: NEUROLOGY | Age: 51
End: 2021-01-27
Payer: COMMERCIAL

## 2021-01-27 VITALS
RESPIRATION RATE: 20 BRPM | OXYGEN SATURATION: 92 % | BODY MASS INDEX: 34.8 KG/M2 | TEMPERATURE: 97.3 F | DIASTOLIC BLOOD PRESSURE: 102 MMHG | SYSTOLIC BLOOD PRESSURE: 138 MMHG | HEART RATE: 94 BPM | WEIGHT: 248.6 LBS | HEIGHT: 71 IN

## 2021-01-27 DIAGNOSIS — M47.816 LUMBAR SPONDYLOSIS: ICD-10-CM

## 2021-01-27 DIAGNOSIS — M1A.0710 IDIOPATHIC CHRONIC GOUT OF RIGHT FOOT WITHOUT TOPHUS: ICD-10-CM

## 2021-01-27 DIAGNOSIS — M48.02 CERVICAL STENOSIS OF SPINAL CANAL: Primary | ICD-10-CM

## 2021-01-27 PROCEDURE — 99204 OFFICE O/P NEW MOD 45 MIN: CPT | Performed by: PSYCHIATRY & NEUROLOGY

## 2021-01-27 NOTE — LETTER
1/27/2021 Patient: Nelly Corrales YOB: 1970 Date of Visit: 1/27/2021 Catherine Ji MD 
[address] [place] [id number] [address] [employer] Dear Catherine Ji MD, Thank you for referring Mr. Jillian Aquino to [facility] for evaluation. My notes for this consultation are attached. If you have questions, please do not hesitate to call me. I look forward to following your patient along with you. Sincerely, Vidhi Chavez MD

## 2021-01-27 NOTE — PROGRESS NOTES
NEUROLOGY HISTORY AND PHYSICAL    Name Latoya Chang Age 53 y.o. MRN [id number]  1970   `  Referring Physician: Dawna Anguiano MD      Chief Complaint:  Neck pain     This is a 53 y.o. Right handed male with a medical history hypertension and gout he is here with complaint of neck pain which started it started several years ago. It has been progressively getting worse. He was seen in the ER 11/15/2020 as well as [facility] on the day previous. CT scan from November shows some arthritic changes and possible canal stenosis. Assessment and Plan  1. Neck pain and back pain  MRI of the neck   Continue duloxetine and vloaren    2. Hypertension  Continue hydrochlorthiazide       3. Hyperlipidemia  Continue pravastatin        No Known Allergies        Current Outpatient Medications   Medication Sig    DULoxetine (CYMBALTA) 20 mg capsule Take 20 mg by mouth daily.  pravastatin (PRAVACHOL) 40 mg tablet Take 1 Tab by mouth nightly.  varenicline (CHANTIX STARTER BARBER) 0.5 mg (11)- 1 mg (42) DsPk Use as directed    varenicline (CHANTIX) 1 mg tablet Take 1 Tab by mouth two (2) times a day for 90 days.  diclofenac EC (VOLTAREN) 50 mg EC tablet Take 1 Tab by mouth two (2) times a day. As needed    losartan (COZAAR) 100 mg tablet Take 1 Tab by mouth daily.  allopurinoL (ZYLOPRIM) 300 mg tablet Take 1 Tab by mouth daily.  QUEtiapine SR (SEROquel XR) 50 mg sr tablet Take 50 mg by mouth nightly.  omeprazole (PRILOSEC) 40 mg capsule Take 1 Cap by mouth daily.  hydroCHLOROthiazide (HYDRODIURIL) 25 mg tablet Take 0.5 Tabs by mouth daily.  cyclobenzaprine (FLEXERIL) 10 mg tablet Take 1 Tab by mouth three (3) times daily as needed for Muscle Spasm(s).  colchicine (Colcrys) 0.6 mg tablet Take 1 Tab by mouth daily.  As needed for gout    butalbital-acetaminophen-caffeine (FIORICET, ESGIC) -40 mg per tablet TAKE 1 TABLET BY MOUTH EVERY 6 HOURS AS NEEDED FOR HEADACHE    triamcinolone acetonide (KENALOG) 0.1 % topical cream APPLY  CREAM EXTERNALLY TO AFFECTED AREA TWICE DAILY . USE  THIN  LAYER    sildenafil citrate (VIAGRA) 100 mg tablet Take 1 Tab by mouth as needed for Erectile Dysfunction.  magnesium 250 mg tab Take  by mouth.  aspirin 81 mg chewable tablet Take 81 mg by mouth daily. No current facility-administered medications for this visit. Past Medical History:   Diagnosis Date    Depression     Hypercholesterolemia     Hypertension     Psychotic disorder ([facility] [address]         Social History     Tobacco Use    Smoking status: Current Every Day Smoker     Packs/day: 1.00     Years: 32.00     Pack years: 32.00     Types: Cigarettes     Last attempt to quit: 2019     Years since quittin.1    Smokeless tobacco: Never Used   Substance Use Topics    Alcohol use: Not Currently     Alcohol/week: 1.0 standard drinks     Types: 1 Cans of beer per week     Frequency: Never     Comment: occ    Drug use: Never        Review of Systems   Constitutional: Negative for chills and fever. HENT: Negative for ear pain. Eyes: Negative for pain and discharge. Respiratory: Negative for cough and hemoptysis. Cardiovascular: Negative for chest pain and claudication. Gastrointestinal: Negative for constipation and diarrhea. Genitourinary: Negative for flank pain and hematuria. Musculoskeletal: Positive for back pain, joint pain, myalgias and neck pain. Skin: Negative for itching and rash. Neurological: Negative for headaches. Endo/Heme/Allergies: Negative for environmental allergies. Does not bruise/bleed easily. Psychiatric/Behavioral: Positive for depression. Negative for hallucinations. Exam  Visit Vitals  Temp 97.3 °F (36.3 °C)   Resp 20   Ht 5' 11\" (1.803 m)   Wt 248 lb 9.6 oz (112.8 kg)   BMI 34.67 kg/m²         General: Well developed, well nourished.  Patient in obvious pain   Head: Normocephalic, atraumatic, anicteric sclera   Neck Normal ROM, No thyromegally   Lungs:  Clear to auscultation    Cardiac: Regular rate and rhythm with no murmurs. Abd: Bowel sounds were audible   Ext: No pedal edema   Skin: Supple no rash     NeurologicExam:  Mental Status: Alert and oriented to person place and time   Speech: Fluent no aphasia or dysarthria. Cranial Nerves:   II-XII Intact    Motor:  Full and symmetric strength of upper and lower ext . Normal bulk and tone. Reflexes:   Deep tendon reflexes 2+/4 and symmetric. Sensory:   Symmetric and intact    Gait:  Gait is slow   Tremor:   No tremor noted. Cerebellar:  Coordination intact. Neurovascular: No carotid bruits.  No JVD      Lab Review  Lab Results   Component Value Date/Time    WBC 5.6 11/15/2020 11:55 PM    HCT 45.0 11/15/2020 11:55 PM    HGB 14.9 11/15/2020 11:55 PM    PLATELET 697 12/42/9995 11:55 PM     Lab Results   Component Value Date/Time    Sodium 136 11/15/2020 11:55 PM    Potassium 3.7 11/15/2020 11:55 PM    Chloride 104 11/15/2020 11:55 PM    CO2 25 11/15/2020 11:55 PM    Glucose 116 (H) 11/15/2020 11:55 PM    BUN 13 11/15/2020 11:55 PM    Creatinine 1.12 11/15/2020 11:55 PM    Calcium 9.5 11/15/2020 11:55 PM       Lab Results   Component Value Date/Time    Hemoglobin A1c 6.0 (H) 01/03/2020 12:07 PM

## 2021-02-09 ENCOUNTER — HOSPITAL ENCOUNTER (OUTPATIENT)
Dept: MRI IMAGING | Age: 51
Discharge: HOME | End: 2021-02-09
Attending: PSYCHIATRY & NEUROLOGY
Payer: COMMERCIAL

## 2021-02-09 DIAGNOSIS — M48.02 CERVICAL STENOSIS OF SPINAL CANAL: ICD-10-CM

## 2021-02-09 PROCEDURE — 72141 MRI NECK SPINE W/O DYE: CPT

## 2021-02-11 ENCOUNTER — TELEPHONE (OUTPATIENT)
Dept: INTERNAL MEDICINE CLINIC | Age: 51
End: 2021-02-11

## 2021-02-18 ENCOUNTER — VIRTUAL VISIT (OUTPATIENT)
Dept: NEUROLOGY | Age: 51
End: 2021-02-18
Payer: COMMERCIAL

## 2021-02-18 DIAGNOSIS — M47.816 LUMBAR SPONDYLOSIS: ICD-10-CM

## 2021-02-18 DIAGNOSIS — M62.838 CERVICAL PARASPINAL MUSCLE SPASM: ICD-10-CM

## 2021-02-18 DIAGNOSIS — M48.02 CERVICAL STENOSIS OF SPINAL CANAL: Primary | ICD-10-CM

## 2021-02-18 DIAGNOSIS — M1A.0710 IDIOPATHIC CHRONIC GOUT OF RIGHT FOOT WITHOUT TOPHUS: ICD-10-CM

## 2021-02-18 PROCEDURE — 99443 PR PHYS/QHP TELEPHONE EVALUATION 21-30 MIN: CPT | Performed by: PSYCHIATRY & NEUROLOGY

## 2021-02-18 RX ORDER — CYCLOBENZAPRINE HCL 10 MG
10 TABLET ORAL
Qty: 90 TAB | Refills: 3 | Status: SHIPPED | OUTPATIENT
Start: 2021-02-18 | End: 2021-07-14 | Stop reason: ALTCHOICE

## 2021-02-18 NOTE — PROGRESS NOTES
Follow-up Visit    Name Marcy Ruelas Age 53 y.o. MRN [id number]  1970       Chief Complaint: neck pain    Mr. Miguel Galloway returns for follow-up visit. He has no new weakness or sensory loss. He continues to suffer from neck pain. Remarks that the cyclobenzaprine was effective for him but he has ran out of this medication. We discussed the results of the MRI of the cervical spine I discussed in general stenosis. I also discussed straightening of the cervical cord and how this may allude to muscle spasms of the neck. He expressed that he wishes to explore his lower back pain which starts approximately L4. He has tightening of both legs and at times he cannot walk. He spends much of his time in bed. We discussed an EMG and possibly imaging studies of the lower back and he was amenable to this. Assesment and Plan  1. Cervical stenosis of spinal canal  Start cyclobenzaprine 10 mg 3 times a day  - EMG NCV MOTOR WITH F/WAVE PER NERVE; Future    2. Lumbar spondylosis    - EMG NCV MOTOR WITH F/WAVE PER NERVE; Future    3. Idiopathic chronic gout of right foot without tophus  Continue allopurinol      Allergies  Patient has no known allergies. Medications  Current Outpatient Medications   Medication Sig    DULoxetine (CYMBALTA) 20 mg capsule Take 20 mg by mouth daily.  pravastatin (PRAVACHOL) 40 mg tablet Take 1 Tab by mouth nightly.  varenicline (CHANTIX STARTER ABRBER) 0.5 mg (11)- 1 mg (42) DsPk Use as directed    varenicline (CHANTIX) 1 mg tablet Take 1 Tab by mouth two (2) times a day for 90 days.  diclofenac EC (VOLTAREN) 50 mg EC tablet Take 1 Tab by mouth two (2) times a day. As needed    losartan (COZAAR) 100 mg tablet Take 1 Tab by mouth daily.  allopurinoL (ZYLOPRIM) 300 mg tablet Take 1 Tab by mouth daily.  QUEtiapine SR (SEROquel XR) 50 mg sr tablet Take 50 mg by mouth nightly.  omeprazole (PRILOSEC) 40 mg capsule Take 1 Cap by mouth daily.     hydroCHLOROthiazide (HYDRODIURIL) 25 mg tablet Take 0.5 Tabs by mouth daily.  cyclobenzaprine (FLEXERIL) 10 mg tablet Take 1 Tab by mouth three (3) times daily as needed for Muscle Spasm(s).  colchicine (Colcrys) 0.6 mg tablet Take 1 Tab by mouth daily. As needed for gout    butalbital-acetaminophen-caffeine (FIORICET, ESGIC) -40 mg per tablet TAKE 1 TABLET BY MOUTH EVERY 6 HOURS AS NEEDED FOR HEADACHE    triamcinolone acetonide (KENALOG) 0.1 % topical cream APPLY  CREAM EXTERNALLY TO AFFECTED AREA TWICE DAILY . USE  THIN  LAYER    sildenafil citrate (VIAGRA) 100 mg tablet Take 1 Tab by mouth as needed for Erectile Dysfunction.  magnesium 250 mg tab Take  by mouth.  aspirin 81 mg chewable tablet Take 81 mg by mouth daily. No current facility-administered medications for this visit. Medical History  Past Medical History:   Diagnosis Date    Depression     Hypercholesterolemia     Hypertension     Psychotic disorder ([facility] [address]        Review of Systems   Eyes: Negative for blurred vision and double vision. Respiratory: Negative for cough and shortness of breath. Cardiovascular: Negative for chest pain, palpitations and orthopnea. Gastrointestinal: Negative for nausea and vomiting. Musculoskeletal: Positive for back pain, myalgias and neck pain. Neurological: Negative for dizziness and headaches. Psychiatric/Behavioral: Positive for depression. Negative for suicidal ideas. The patient is nervous/anxious.               Lab Review  Lab Results   Component Value Date/Time    WBC 5.6 11/15/2020 11:55 PM    HCT 45.0 11/15/2020 11:55 PM    HGB 14.9 11/15/2020 11:55 PM    PLATELET 235 43/50/5159 11:55 PM       Lab Results   Component Value Date/Time    Sodium 136 11/15/2020 11:55 PM    Potassium 3.7 11/15/2020 11:55 PM    Chloride 104 11/15/2020 11:55 PM    CO2 25 11/15/2020 11:55 PM    Glucose 116 (H) 11/15/2020 11:55 PM    BUN 13 11/15/2020 11:55 PM    Creatinine 1.12 11/15/2020 11:55 PM    Calcium 9.5 11/15/2020 11:55 PM       Lab Results   Component Value Date/Time    Hemoglobin A1c 6.0 (H) 01/03/2020 12:07 PM        Vijay Dumas was seen by synchronous (real-time) audio-video technology on 02/18/21. I was in the office while conducting this encounter. Consent:  He and/or his healthcare decision maker is aware that this patient-initiated Telehealth encounter is a billable service, with coverage as determined by his insurance carrier. He is aware that he may receive a bill and has provided verbal consent to proceed: Yes    This virtual visit was conducted telephone encounter only. -  I affirm this is a Patient Initiated Episode with an Established Patient who has not had a related appointment within my department in the past 7 days or scheduled within the next 24 hours. Note: this encounter is not billable if this call serves to triage the patient into an appointment for the relevant concern. Total Time: minutes: 21-30 minutes.

## 2021-03-01 ENCOUNTER — OFFICE VISIT (OUTPATIENT)
Dept: NEUROLOGY | Age: 51
End: 2021-03-01
Payer: COMMERCIAL

## 2021-03-01 DIAGNOSIS — G60.9 IDIOPATHIC PERIPHERAL NEUROPATHY: ICD-10-CM

## 2021-03-01 PROCEDURE — 95913 NRV CNDJ TEST 13/> STUDIES: CPT | Performed by: PSYCHIATRY & NEUROLOGY

## 2021-03-01 PROCEDURE — 95886 MUSC TEST DONE W/N TEST COMP: CPT | Performed by: PSYCHIATRY & NEUROLOGY

## 2021-03-01 NOTE — PROCEDURES
Electrodiagnostic Study Report  Test Date:  3/1/2021    Patient: Kofi Draper : 1970 Physician: Dr. Calderon Turpin   Sex: Male Tech: John MCCANN Ref Phys: Calderon Turpin M.D. History: This is a 53year old Male with a complaint of   Neck pain and bilateral lower extremity pain and bilateral upper extremity sensory loss. EMG & NCV Findings:  Nerve conduction study was essentially normal with exception of a mild right ulnar sensory neuropathy. Insertional muscle activity evaluated with disposable needle electrodes revealed normal activity for the muscles tested.      Impression  Normal exam. Small fiber neuropathy can't be excluded.       __________________  Stella Arnold M.D.      Nerve Conduction Studies  Anti Sensory Summary Table     Site NR Peak (ms) Norm Peak (ms) O-P Amp (µV) Norm O-P Amp Site1 Site2 Dist (cm)   Left Median Anti Sensory (2nd Digit)   Wrist    3.4 <4 17.9 >11 Wrist 2nd Digit 14.0   Right Median Anti Sensory (2nd Digit)   Wrist    3.3 <4 23.8 >11 Wrist 2nd Digit 14.0   Left Radial Anti Sensory (Base 1st Digit)   Wrist    2.6 <2.9 17.7 >15 Wrist Base 1st Digit 10.0   Right Radial Anti Sensory (Base 1st Digit)   Wrist    2.3 <2.9 26.2 >15 Wrist Base 1st Digit 10.0   Left Sup Fibular Anti Sensory (Lat ankle)   Lower leg    3.0 <4.5 10.8 >5 Lower leg Lat ankle 10.0   Left Sural Anti Sensory (Lat Mall)   Calf    3.8 <4.4 9.5 >6 Calf Lat Mall 14.0   Left Ulnar Anti Sensory (5th Digit)   Wrist    3.6 <4.0 10.3 >10 Wrist 5th Digit 14.0   Right Ulnar Anti Sensory (5th Digit)   Wrist    0.9 <4.0 4.6 >10 Wrist 5th Digit 14.0     Motor Summary Table     Site NR Onset (ms) Norm Onset (ms) O-P* Amp (mV) Norm O-P Amp P-T Amp (mV) Site1 Site2 Dist (cm) Magnus (m/s)   Left Fibular Motor (Ext Dig Brev)   Ankle    3.8 <6.5 7.9 >2.6  Ankle Ext Dig Brev 8.0 21   B Fib    10.6  6.5   B Fib Ankle 33.5 49   Poplt    13.0  6.7   Poplt B Fib 10.0 42   Left Median Motor (Abd Poll Brev)   Wrist 4. 2 <4.5 8.1 >4.1  Wrist Abd Poll Brev 8.0 19   Elbow    9.1  7.4   Elbow Wrist 26.5 54   Right Median Motor (Abd Poll Brev)   Wrist    4.2 <4.5 9.9 >4.1  Wrist Abd Poll Brev 8.0 19   Elbow    8.7  9.4   Elbow Wrist 23.5 52   Left Tibial Motor (Abd Aguilar Brev)   Ankle    4.1 <6.1 8.5 >5.3  Ankle Abd Aguilar Brev 8.0 20   Knee    13.7  5.6   Knee Ankle 38.0 40   Left Ulnar Motor (Abd Dig Minimi)   Wrist    3.0 <3.1 9.4 >7.0  Wrist Abd Dig Minimi 8.0 27   B Elbow    7.7  8.9   B Elbow Wrist 23.5 50   A Elbow    9.9  9.0   A Elbow B Elbow 11.0 50   Right Ulnar Motor (Abd Dig Minimi)   Wrist    2.6 <3.1 11.0 >7.0  Wrist Abd Dig Minimi 8.0 31   B Elbow    7.1  11.0   B Elbow Wrist 23.0 51   A Elbow    8.9  10.6   A Elbow B Elbow 10.0 56     Comparison Summary Table     Site NR Peak (ms) P-T Amp (µV) Site1 Site2 Dist (cm) Delta-0 (ms)   Left Median/Ulnar Palm Comparison (Wrist)   Median Palm    1.9 65.2 Median Palm Ulnar Palm 8.0 0.3   Ulnar Palm    1.9 4.8       Right Median/Ulnar Palm Comparison (Wrist)   Median Palm    1.9 44.3 Median Palm Ulnar Palm 8.0 0.0   Ulnar Palm    1.8 10.9         EMG     Side Muscle Nerve Root Ins Act Fibs Psw Recrt Duration Amp Poly Comment   Left AntTibialis Dp Br Peron L4-5 Nml Nml Nml Nml Nml Nml Nml    Left MedGastroc Tibial S1-2 Nml Nml Nml Nml Nml Nml Nml    Left VastusLat Femoral L2-4 Nml Nml Nml Nml Nml Nml Nml    Left Ext Dig Brev Dp Br Peron L5, S1 Nml Nml Nml Nml Nml Nml Nml    Left AbdHallucis MedPlantar S1-2 Nml Nml Nml Nml Nml Nml Nml        Waveforms:

## 2021-06-21 ENCOUNTER — OFFICE VISIT (OUTPATIENT)
Dept: INTERNAL MEDICINE CLINIC | Age: 51
End: 2021-06-21
Payer: COMMERCIAL

## 2021-06-21 VITALS
HEART RATE: 99 BPM | RESPIRATION RATE: 16 BRPM | BODY MASS INDEX: 34.3 KG/M2 | TEMPERATURE: 98.6 F | WEIGHT: 245 LBS | HEIGHT: 71 IN | OXYGEN SATURATION: 96 % | DIASTOLIC BLOOD PRESSURE: 77 MMHG | SYSTOLIC BLOOD PRESSURE: 126 MMHG

## 2021-06-21 DIAGNOSIS — I10 ESSENTIAL HYPERTENSION: ICD-10-CM

## 2021-06-21 DIAGNOSIS — G62.9 NEUROPATHY: ICD-10-CM

## 2021-06-21 DIAGNOSIS — M10.041 IDIOPATHIC GOUT OF RIGHT HAND, UNSPECIFIED CHRONICITY: ICD-10-CM

## 2021-06-21 DIAGNOSIS — E03.9 ACQUIRED HYPOTHYROIDISM: ICD-10-CM

## 2021-06-21 DIAGNOSIS — E78.00 HYPERCHOLESTEREMIA: ICD-10-CM

## 2021-06-21 DIAGNOSIS — F32.1 MODERATE MAJOR DEPRESSION (HCC): ICD-10-CM

## 2021-06-21 DIAGNOSIS — M19.90 ARTHRITIS: Primary | ICD-10-CM

## 2021-06-21 PROCEDURE — 99214 OFFICE O/P EST MOD 30 MIN: CPT | Performed by: FAMILY MEDICINE

## 2021-06-21 RX ORDER — PREDNISONE 20 MG/1
40 TABLET ORAL
Qty: 10 TABLET | Refills: 0 | Status: SHIPPED | OUTPATIENT
Start: 2021-06-21 | End: 2021-06-26

## 2021-06-21 NOTE — PROGRESS NOTES
Chief Complaint   Patient presents with    Foot Pain     Patient has not been out of the country in (14 months), NO diarrhea, NO cough, NO chest conjestion, NO temp. Pt has not been around anyone with these symptoms. Health Maintenance reviewed. I have reviewed the patient's medical history in detail and updated the computerized patient record. 1. Have you been to the ER, urgent care clinic since your last visit? yes  Hospitalized since your last visit? yes    2. Have you seen or consulted any other health care providers outside of the [address] [place] [address] since your last visit? Yes  Include any pap smears or colon screening. Encouraged pt to discuss pt's wishes with spouse/partner/family and bring them in the next appt to follow thru with the Advanced Directive    @  [address], last 12 mths 2/18/2021   Able to walk? Yes   Fall in past 12 months? 0   Do you feel unsteady? 0   Are you worried about falling 0       3 most recent PHQ Screens 6/21/2021   Little interest or pleasure in doing things Several days   Feeling down, depressed, irritable, or hopeless Several days   Total Score PHQ 2 2   Trouble falling or staying asleep, or sleeping too much -   Feeling tired or having little energy -   Poor appetite, weight loss, or overeating -   Feeling bad about yourself - or that you are a failure or have let yourself or your family down -   Trouble concentrating on things such as school, work, reading, or watching TV -   Moving or speaking so slowly that other people could have noticed; or the opposite being so fidgety that others notice -   Thoughts of being better off dead, or hurting yourself in some way -   PHQ 9 Score -   How difficult have these problems made it for you to do your work, take care of your home and get along with others -       Abuse Screening Questionnaire 6/21/2021   Do you ever feel afraid of your partner?  N   Are you in a relationship with someone who physically or mentally threatens you? N   Is it safe for you to go home?  Y       ADL Assessment 6/21/2021   Feeding yourself No Help Needed   Getting from bed to chair No Help Needed   Getting dressed No Help Needed   Bathing or showering No Help Needed   Walk across the room (includes cane/walker) No Help Needed   Using the telphone No Help Needed   Taking your medications No Help Needed   Preparing meals No Help Needed   Managing money (expenses/bills) No Help Needed   Moderately strenuous housework (laundry) No Help Needed   Shopping for personal items (toiletries/medicines) No Help Needed   Shopping for groceries No Help Needed   Driving No Help Needed   Climbing a flight of stairs No Help Needed   Getting to places beyond walking distances No Help Needed

## 2021-06-21 NOTE — PATIENT INSTRUCTIONS
[facility] Produced Tobacco-Free Nicotine All-White Pouches, Very Berry Flavor, 3mg (21 Pouches) 1 can Gout: Care Instructions Overview Gout is a form of arthritis caused by a buildup of uric acid crystals in a joint. It causes sudden attacks of pain, swelling, redness, and stiffness, usually in one joint, especially the big toe. Gout usually comes on without a cause. But it can be brought on by drinking alcohol (especially beer), eating or drinking things made with high-fructose corn syrup, or eating seafood or red meat. Taking certain medicines, such as diuretics, can also trigger an attack of gout. Taking your medicines as prescribed and following up with your doctor regularly can help you avoid gout attacks in the future. Follow-up care is a key part of your treatment and safety. Be sure to make and go to all appointments, and call your doctor if you are having problems. It's also a good idea to know your test results and keep a list of the medicines you take. How can you care for yourself at home? · If the joint is swollen, put ice or a cold pack on the area for 10 to 20 minutes at a time. Put a thin cloth between the ice and your skin. · Prop up the sore limb on a pillow when you ice it or anytime you sit or lie down during the next 3 days. Try to keep it above the level of your heart. This can help reduce swelling. · Rest sore joints. Avoid activities that put weight or strain on the joints for a few days. Take short rest breaks from your regular activities during the day. · Take your medicines exactly as prescribed. Call your doctor if you think you are having a problem with your medicine. · Take pain medicines exactly as directed. ? If the doctor gave you a prescription medicine for pain, take it as prescribed. ? If you are not taking a prescription pain medicine, ask your doctor if you can take an over-the-counter medicine. · Eat less seafood and red meat.  
· Avoid foods or drinks that are made with high-fructose corn syrup. · Check with your doctor before drinking alcohol. · Losing weight, if you are overweight, may help reduce attacks of gout. But do not go on a diet that causes rapid weight loss. Losing a lot of weight in a short amount of time can cause a gout attack. When should you call for help? Call your doctor now or seek immediate medical care if: 
  · You have a fever.  
  · The joint is so painful you cannot use it.  
  · You have sudden, unexplained swelling, redness, warmth, or severe pain in one or more joints. Watch closely for changes in your health, and be sure to contact your doctor if: 
  · You have joint pain.  
  · Your symptoms get worse or are not improving after 2 or 3 days. Where can you learn more? Go to http://www.gray.com/ Enter S740 in the search box to learn more about \"Gout: Care Instructions. \" Current as of: August 5, 2020               Content Version: 12.8 © 2006-2021 [employer]. Care instructions adapted under license by [employer] (which disclaims liability or warranty for this information). If you have questions about a medical condition or this instruction, always ask your healthcare professional. [place] [address] any warranty or liability for your use of this information. Deciding About Using Medicines To Quit Smoking How can you decide about using medicines to quit smoking? What are the medicines you can use? Your doctor may prescribe varenicline (Chantix) or bupropion (Zyban). These medicines can help you cope with cravings for tobacco. They are pills that don't contain nicotine. You also can use nicotine replacement products. These do contain nicotine. There are many types. · Gum and lozenges slowly release nicotine into your mouth. · Patches stick to your skin.  They slowly release nicotine into your bloodstream. 
· An inhaler has a deal that contains nicotine. You breathe in a puff of nicotine vapor through your mouth and throat. · Nasal spray releases a mist that contains nicotine. What are key points about this decision? · Using medicines can double your chances of quitting smoking. They can ease cravings and withdrawal symptoms. · Getting counseling along with using medicine can raise your chances of quitting even more. · If you smoke fewer than 5 cigarettes a day, you may not need medicines to help you quit smoking. · These medicines have less nicotine than cigarettes. And by itself, nicotine is not nearly as harmful as smoking. The tars, carbon monoxide, and other toxic chemicals in tobacco cause the harmful effects. · The side effects of nicotine replacement products depend on the type of product. For example, a patch can make your skin red and itchy. Medicines in pill form can make you sick to your stomach. They can also cause dry mouth and trouble sleeping. For most people, the side effects are not bad enough to make them stop using the products. Why might you choose to use medicines to quit smoking? · You have tried on your own to stop smoking, but you were not able to stop. · You smoke more than 5 cigarettes a day. · You want to increase your chances of quitting smoking. · You want to reduce your cravings and withdrawal symptoms. · You feel the benefits of medicine outweigh the side effects. Why might you choose not to use medicine? · You want to try quitting on your own by stopping all at once (\"cold turkey\"). · You want to cut back slowly on the number of cigarettes you smoke. · You smoke fewer than 5 cigarettes a day. · You do not like using medicine. · You feel the side effects of medicines outweigh the benefits. · You are worried about the cost of medicines. Your decision Thinking about the facts and your feelings can help you make a decision that is right for you.  Be sure you understand the benefits and risks of your options, and think about what else you need to do before you make the decision. Where can you learn more? Go to http://www.gray.com/ Enter K580 in the search box to learn more about \"Deciding About Using Medicines To Quit Smoking. \" Current as of: March 12, 2020               Content Version: 12.8 © 0107-5092 [employer], [employer]. Care instructions adapted under license by [employer] (which disclaims liability or warranty for this information). If you have questions about a medical condition or this instruction, always ask your healthcare professional. [place] [address] any warranty or liability for your use of this information.

## 2021-06-21 NOTE — PROGRESS NOTES
Subjective:     Chief Complaint   Patient presents with    Knee Pain     gout knees and hands        He  is a 53y.o. year old male who presents for evaluation of as above Following diet but still having joint pains    ROS:    No rash or fever, voices bad sometimes, seeing Arturo    No Known Allergies   Social History     Socioeconomic History    Marital status:      Spouse name: Not on file    Number of children: Not on file    Years of education: Not on file    Highest education level: Not on file   Occupational History    Occupation: unemployed   Tobacco Use    Smoking status: Current Every Day Smoker     Packs/day: 0.05     Years: 32.00     Pack years: 1.60     Types: Cigarettes     Last attempt to quit: 2019     Years since quittin.5    Smokeless tobacco: Never Used   Vaping Use    Vaping Use: Never used   Substance and Sexual Activity    Alcohol use: Not Currently     Alcohol/week: 1.0 standard drinks     Types: 1 Cans of beer per week     Comment: occ    Drug use: Never    Sexual activity: Yes     Partners: Female     Social Determinants of Health     Financial Resource Strain:     Difficulty of Paying Living Expenses:    Food Insecurity:     Worried About Running Out of Food in the Last Year:     [address] in the Last Year:    Transportation Needs:     Lack of Transportation (Medical):      Lack of Transportation (Non-Medical):    Physical Activity:     Days of Exercise per Week:     Minutes of Exercise per Session:    Stress:     Feeling of Stress :    Social Connections:     Frequency of Communication with Friends and Family:     Frequency of Social Gatherings with Friends and Family:     Attends Mu-ism Services:     Active Member of Clubs or Organizations:     Attends Club or Organization Meetings:     Marital Status:       Family History   Problem Relation Age of Onset    Cancer Mother     Migraines Sister     Stroke Maternal Aunt       History reviewed. No pertinent surgical history. Past Medical History:   Diagnosis Date    Depression     Hypercholesterolemia     Hypertension     Psychotic disorder ([facility] [address]             Objective:     Physical Examination:  Visit Vitals  /77 (BP 1 Location: Left arm, BP Patient Position: Sitting, BP Cuff Size: Adult)   Pulse 99   Temp 98.6 °F (37 °C) (Oral)   Resp 16   Ht 5' 11\" (1.803 m)   Wt 245 lb (111.1 kg)   SpO2 96%   BMI 34.17 kg/m²   -    - General: distracted, poor eye contact  - Mental status:  Normal mood, behavior, speech, dress, motor activity and thought processes  - Cardiovascular: regular, normal rate, normal S1 and S2, no murmurs/rubs/gallops   - Respiratory: clear to auscultation bilaterally  - Psychiatric: as above  - Right knee pain with palp, fingers an deformities sl joint swelling      Labs/Procedures:    No results found for any visits on 06/21/21. Assessment/ Plan:       ICD-10-CM ICD-9-CM    1. Arthritis  M19.90 716.90 CBC W/O DIFF      RA + CCP ABS      URIC ACID      CK      SED RATE (ESR)      predniSONE (DELTASONE) 20 mg tablet      XR KNEE RT MAX 2 VWS      XR KNEE LT MAX 2 VWS   2. Moderate major depression (HCC)  F32.1 296.22    3. Essential hypertension  A16 394.5 METABOLIC PANEL, COMPREHENSIVE   4. Neuropathy  G62.9 355.9    5. Acquired hypothyroidism  E03.9 244.9 TSH 3RD GENERATION   6.  Hypercholesteremia  E78.00 272.0    7. Idiopathic gout of right hand, unspecified chronicity  M10.041 274.00 XR HAND LT MIN 3 V      XR HAND RT MIN 3 V     Orders Placed This Encounter    XR KNEE RT MAX 2 VWS     Standing Status:   Future     Standing Expiration Date:   7/21/2022     Scheduling Instructions:      [facility]    XR KNEE LT MAX 2 VWS     Standing Status:   Future     Standing Expiration Date:   7/21/2022     Order Specific Question:   Reason for Exam     Answer:   pain    XR HAND LT MIN 3 V     Standing Status:   Future     Standing Expiration Date:   7/21/2022     Order Specific Question:   Reason for Exam     Answer:   pain    XR HAND RT MIN 3 V     Standing Status:   Future     Standing Expiration Date:   7/21/2022     Scheduling Instructions:      [facility]     Order Specific Question:   Reason for Exam     Answer:   pain    METABOLIC PANEL, COMPREHENSIVE     Standing Status:   Future     Standing Expiration Date:   12/22/2021    CBC W/O DIFF     Standing Status:   Future     Standing Expiration Date:   12/22/2021    RA + CCP ABS     Standing Status:   Future     Standing Expiration Date:   6/21/2022    URIC ACID     Standing Status:   Future     Standing Expiration Date:   12/21/2021    CK     Standing Status:   Future     Standing Expiration Date:   12/21/2021    TSH 3RD GENERATION     Standing Status:   Future     Standing Expiration Date:   12/21/2021    SED RATE (ESR)     Standing Status:   Future     Standing Expiration Date:   12/21/2021    predniSONE (DELTASONE) 20 mg tablet     Sig: Take 40 mg by mouth daily (with breakfast) for 5 days. Dispense:  10 Tablet     Refill:  0     W/up as above trial of steroids    I have discussed the diagnosis with the patient and the intended plan as seen in the above orders. The patient has received an after-visit summary and questions were answered concerning future plans. I have discussed medication side effects and warnings with the patient as well.     F/u 3 weeks

## 2021-06-22 ENCOUNTER — TELEPHONE (OUTPATIENT)
Dept: INTERNAL MEDICINE CLINIC | Age: 51
End: 2021-06-22

## 2021-06-22 RX ORDER — TRIAMCINOLONE ACETONIDE 1 MG/G
CREAM TOPICAL
Qty: 45 G | Refills: 1 | Status: SHIPPED | OUTPATIENT
Start: 2021-06-22 | End: 2021-11-04

## 2021-06-22 RX ORDER — ALLOPURINOL 300 MG/1
TABLET ORAL
Qty: 90 TABLET | Refills: 1 | Status: SHIPPED | OUTPATIENT
Start: 2021-06-22 | End: 2021-12-30 | Stop reason: SDUPTHER

## 2021-06-25 RX ORDER — COLCHICINE 0.6 MG/1
0.6 TABLET ORAL DAILY
Qty: 10 TABLET | Refills: 5 | Status: SHIPPED | OUTPATIENT
Start: 2021-06-25 | End: 2021-07-14 | Stop reason: ALTCHOICE

## 2021-06-25 NOTE — TELEPHONE ENCOUNTER
Pharmacy refill request      Requested Prescriptions     Pending Prescriptions Disp Refills    colchicine (Colcrys) 0.6 mg tablet 10 Tablet 5     Sig: Take 1 Tablet by mouth daily.  As needed for gout

## 2021-06-29 ENCOUNTER — DOCUMENTATION ONLY (OUTPATIENT)
Dept: INTERNAL MEDICINE CLINIC | Age: 51
End: 2021-06-29

## 2021-06-29 RX ORDER — COLCHICINE 0.6 MG/1
0.6 CAPSULE ORAL DAILY
Qty: 30 CAPSULE | Refills: 5 | Status: SHIPPED | OUTPATIENT
Start: 2021-06-29 | End: 2021-08-25 | Stop reason: SDUPTHER

## 2021-06-29 NOTE — PROGRESS NOTES
6/29/21 PA case ID: [id number] Key: [id number] Claim submitted for Colchicine 0.6 mg tablets . May call [facility] at [phone] if no response within 24 hours. Plan preferred is Colchicine 0.6 mg capsules.

## 2021-06-30 ENCOUNTER — DOCUMENTATION ONLY (OUTPATIENT)
Dept: INTERNAL MEDICINE CLINIC | Age: 51
End: 2021-06-30

## 2021-06-30 NOTE — PROGRESS NOTES
6/30/21  Key: [id number] - PA Case ID: [id number] - Rx #: [id number]  Outcome  Approved today  Your PA request has been approved. Additional information will be provided in the approval communication.  (Message 1145)  Drug  Colchicine 0.6MG tablets

## 2021-06-30 NOTE — PROGRESS NOTES
Received notification from [address] request for Colchicine 0.6  mg has been approved 30 tabs / 30 days for 1 year - 6/29/2021 - 6/29/2022  Aamir Larsen LPN  9/50/7341  84:87 AM

## 2021-07-01 ENCOUNTER — TELEPHONE (OUTPATIENT)
Dept: INTERNAL MEDICINE CLINIC | Age: 51
End: 2021-07-01

## 2021-07-01 LAB
ALBUMIN SERPL-MCNC: 4.5 G/DL (ref 4–5)
ALBUMIN/GLOB SERPL: 1.4 {RATIO} (ref 1.2–2.2)
ALP SERPL-CCNC: 64 IU/L (ref 48–121)
ALT SERPL-CCNC: 29 IU/L (ref 0–44)
AST SERPL-CCNC: 21 IU/L (ref 0–40)
BILIRUB SERPL-MCNC: 0.3 MG/DL (ref 0–1.2)
BUN SERPL-MCNC: 15 MG/DL (ref 6–24)
BUN/CREAT SERPL: 13 (ref 9–20)
CALCIUM SERPL-MCNC: 9.7 MG/DL (ref 8.7–10.2)
CCP IGA+IGG SERPL IA-ACNC: 6 UNITS (ref 0–19)
CHLORIDE SERPL-SCNC: 99 MMOL/L (ref 96–106)
CK SERPL-CCNC: 276 U/L (ref 49–439)
CO2 SERPL-SCNC: 22 MMOL/L (ref 20–29)
CREAT SERPL-MCNC: 1.15 MG/DL (ref 0.76–1.27)
ERYTHROCYTE [DISTWIDTH] IN BLOOD BY AUTOMATED COUNT: 14.4 % (ref 11.6–15.4)
ERYTHROCYTE [SEDIMENTATION RATE] IN BLOOD BY WESTERGREN METHOD: 42 MM/HR (ref 0–15)
GLOBULIN SER CALC-MCNC: 3.3 G/DL (ref 1.5–4.5)
GLUCOSE SERPL-MCNC: 152 MG/DL (ref 65–99)
HCT VFR BLD AUTO: 45.5 % (ref 37.5–51)
HGB BLD-MCNC: 15 G/DL (ref 13–17.7)
MCH RBC QN AUTO: 27 PG (ref 26.6–33)
MCHC RBC AUTO-ENTMCNC: 33 G/DL (ref 31.5–35.7)
MCV RBC AUTO: 82 FL (ref 79–97)
PLATELET # BLD AUTO: 256 X10E3/UL (ref 150–450)
POTASSIUM SERPL-SCNC: 4 MMOL/L (ref 3.5–5.2)
PROT SERPL-MCNC: 7.8 G/DL (ref 6–8.5)
RBC # BLD AUTO: 5.55 X10E6/UL (ref 4.14–5.8)
RHEUMATOID FACT SERPL-ACNC: <10 IU/ML (ref 0–13.9)
SODIUM SERPL-SCNC: 136 MMOL/L (ref 134–144)
TSH SERPL DL<=0.005 MIU/L-ACNC: 2.38 UIU/ML (ref 0.45–4.5)
WBC # BLD AUTO: 7.1 X10E3/UL (ref 3.4–10.8)

## 2021-07-06 ENCOUNTER — TELEPHONE (OUTPATIENT)
Dept: INTERNAL MEDICINE CLINIC | Age: 51
End: 2021-07-06

## 2021-07-12 ENCOUNTER — TELEPHONE (OUTPATIENT)
Dept: INTERNAL MEDICINE CLINIC | Age: 51
End: 2021-07-12

## 2021-07-12 NOTE — TELEPHONE ENCOUNTER
Message fr wan        Caller's first and last name: Luna Staples wife     Reason for call:He needs a provider to provider referral for RA before they will make an appt. They need it faxed to [phone].      Callback required yes/no and why: Yes when completed     Best contact number(s): [phone]

## 2021-07-14 ENCOUNTER — VIRTUAL VISIT (OUTPATIENT)
Dept: INTERNAL MEDICINE CLINIC | Age: 51
End: 2021-07-14
Payer: COMMERCIAL

## 2021-07-14 DIAGNOSIS — F32.1 MODERATE MAJOR DEPRESSION (HCC): ICD-10-CM

## 2021-07-14 DIAGNOSIS — E78.00 HYPERCHOLESTEREMIA: ICD-10-CM

## 2021-07-14 DIAGNOSIS — M19.049 HAND ARTHRITIS: Primary | ICD-10-CM

## 2021-07-14 DIAGNOSIS — M10.071 ACUTE IDIOPATHIC GOUT OF RIGHT FOOT: ICD-10-CM

## 2021-07-14 DIAGNOSIS — M10.9 GOUT OF LEFT FOOT, UNSPECIFIED CAUSE, UNSPECIFIED CHRONICITY: ICD-10-CM

## 2021-07-14 DIAGNOSIS — K21.9 GASTROESOPHAGEAL REFLUX DISEASE, UNSPECIFIED WHETHER ESOPHAGITIS PRESENT: ICD-10-CM

## 2021-07-14 DIAGNOSIS — I10 ESSENTIAL HYPERTENSION: ICD-10-CM

## 2021-07-14 DIAGNOSIS — M10.00 IDIOPATHIC GOUT, UNSPECIFIED CHRONICITY, UNSPECIFIED SITE: ICD-10-CM

## 2021-07-14 PROCEDURE — 99214 OFFICE O/P EST MOD 30 MIN: CPT | Performed by: FAMILY MEDICINE

## 2021-07-14 RX ORDER — MELOXICAM 15 MG/1
15 TABLET ORAL DAILY
Qty: 30 TABLET | Refills: 5 | Status: SHIPPED | OUTPATIENT
Start: 2021-07-14 | End: 2021-07-26 | Stop reason: SDUPTHER

## 2021-07-14 RX ORDER — HYDROCHLOROTHIAZIDE 25 MG/1
12.5 TABLET ORAL DAILY
Qty: 60 TABLET | Refills: 2 | Status: SHIPPED | OUTPATIENT
Start: 2021-07-14 | End: 2021-07-26 | Stop reason: SINTOL

## 2021-07-14 RX ORDER — PRAVASTATIN SODIUM 40 MG/1
TABLET ORAL
Qty: 30 TABLET | Refills: 3 | Status: SHIPPED | OUTPATIENT
Start: 2021-07-14 | End: 2021-08-25 | Stop reason: SDUPTHER

## 2021-07-14 RX ORDER — LOSARTAN POTASSIUM 100 MG/1
100 TABLET ORAL DAILY
Qty: 9 TABLET | Refills: 3 | Status: SHIPPED | OUTPATIENT
Start: 2021-07-14 | End: 2021-07-15

## 2021-07-14 RX ORDER — OMEPRAZOLE 40 MG/1
40 CAPSULE, DELAYED RELEASE ORAL DAILY
Qty: 90 CAPSULE | Refills: 3 | Status: SHIPPED | OUTPATIENT
Start: 2021-07-14 | End: 2022-09-29

## 2021-07-14 NOTE — PROGRESS NOTES
Chief Complaint   Patient presents with    Arthritis     wants a referral     Patient is aware that this is a Virtual Visit or Phone Call Only doctor's visit. Patient has not been out of the country in (14 months), NO diarrhea, NO cough, NO chest conjestion, NO temp. Pt has not been around anyone with these symptoms. Health Maintenance reviewed. I have reviewed the patient's medical history in detail and updated the computerized patient record. 1. Have you been to the ER, urgent care clinic since your last visit? no Hospitalized since your last visit?  no    2. Have you seen or consulted any other health care providers outside of the [address] [place] [address] since your last visit? no  Include any pap smears or colon screening. Encouraged pt to discuss pt's wishes with spouse/partner/family and bring them in the next appt to follow thru with the Advanced Directive      Fall Risk Assessment, last 12 mths 2/18/2021   Able to walk? Yes   Fall in past 12 months? 0   Do you feel unsteady?  0   Are you worried about falling 0       3 most recent PHQ Screens 7/14/2021   Little interest or pleasure in doing things Several days   Feeling down, depressed, irritable, or hopeless Several days   Total Score PHQ 2 2   Trouble falling or staying asleep, or sleeping too much -   Feeling tired or having little energy -   Poor appetite, weight loss, or overeating -   Feeling bad about yourself - or that you are a failure or have let yourself or your family down -   Trouble concentrating on things such as school, work, reading, or watching TV -   Moving or speaking so slowly that other people could have noticed; or the opposite being so fidgety that others notice -   Thoughts of being better off dead, or hurting yourself in some way -   PHQ 9 Score -   How difficult have these problems made it for you to do your work, take care of your home and get along with others -       Abuse Screening Questionnaire 7/14/2021 Do you ever feel afraid of your partner? N   Are you in a relationship with someone who physically or mentally threatens you? N   Is it safe for you to go home?  Y       ADL Assessment 7/14/2021   Feeding yourself No Help Needed   Getting from bed to chair No Help Needed   Getting dressed No Help Needed   Bathing or showering No Help Needed   Walk across the room (includes cane/walker) No Help Needed   Using the telphone No Help Needed   Taking your medications No Help Needed   Preparing meals No Help Needed   Managing money (expenses/bills) No Help Needed   Moderately strenuous housework (laundry) No Help Needed   Shopping for personal items (toiletries/medicines) No Help Needed   Shopping for groceries No Help Needed   Driving No Help Needed   Climbing a flight of stairs No Help Needed   Getting to places beyond walking distances No Help Needed

## 2021-07-14 NOTE — PROGRESS NOTES
James Fletcher is a 53 y.o. male who was phone evaluated on 7/14/2021. Consent:  He and/or his healthcare decision maker is aware that this patient-initiated Telehealth encounter is a billable service, with coverage as determined by his insurance carrier. He is aware that he may receive a bill and has provided verbal consent to proceed: Yes    I was in the office while conducting this encounter. Assessment & Plan:       ICD-10-CM ICD-9-CM    1. Hand arthritis  M19.049 716.94 REFERRAL TO RHEUMATOLOGY      meloxicam (MOBIC) 15 mg tablet   2. Idiopathic gout, unspecified chronicity, unspecified site  M10.00 274.9    3. Essential hypertension  I10 401.9    4. Moderate major depression (HCC)  F32.1 296.22    5. Hypercholesteremia  E78.00 272.0 pravastatin (PRAVACHOL) 40 mg tablet   6. Acute idiopathic gout of right foot  M10.071 274.01 XR FOOT RT MIN 3 V   7. Gout of left foot, unspecified cause, unspecified chronicity  M10.9 274.9 XR FOOT LT MIN 3 V   8. Gastroesophageal reflux disease, unspecified whether esophagitis present  K21.9 530.81 omeprazole (PRILOSEC) 40 mg capsule     Orders Placed This Encounter    XR FOOT LT MIN 3 V     Standing Status:   Future     Standing Expiration Date:   8/14/2022     Scheduling Instructions:      [facility]     Order Specific Question:   Reason for Exam     Answer:   gout left foot    XR FOOT RT MIN 3 V     Standing Status:   Future     Standing Expiration Date:   8/14/2022    REFERRAL TO RHEUMATOLOGY     Referral Priority:   Routine     Referral Type:   Consultation     Referral Reason:   Specialty Services Required     Referral Location:   [facility]     Referred to Provider:   Shahid Garduno MD    meloxicam (MOBIC) 15 mg tablet     Sig: Take 1 Tablet by mouth daily.  For hand arthritis     Dispense:  30 Tablet     Refill:  5    pravastatin (PRAVACHOL) 40 mg tablet     Sig: Take 1 tablet by mouth nightly     Dispense:  30 Tablet     Refill:  3    losartan (COZAAR) 100 mg tablet     Sig: Take 1 Tablet by mouth daily. Dispense:  9 Tablet     Refill:  3    omeprazole (PRILOSEC) 40 mg capsule     Sig: Take 1 Capsule by mouth daily. Dispense:  90 Capsule     Refill:  3    hydroCHLOROthiazide (HYDRODIURIL) 25 mg tablet     Sig: Take 0.5 Tablets by mouth daily. Dispense:  60 Tablet     Refill:  2     Current Outpatient Medications   Medication Sig Dispense Refill    meloxicam (MOBIC) 15 mg tablet Take 1 Tablet by mouth daily. For hand arthritis 30 Tablet 5    pravastatin (PRAVACHOL) 40 mg tablet Take 1 tablet by mouth nightly 30 Tablet 3    losartan (COZAAR) 100 mg tablet Take 1 Tablet by mouth daily. 9 Tablet 3    omeprazole (PRILOSEC) 40 mg capsule Take 1 Capsule by mouth daily. 90 Capsule 3    hydroCHLOROthiazide (HYDRODIURIL) 25 mg tablet Take 0.5 Tablets by mouth daily. 60 Tablet 2    sildenafil citrate (VIAGRA) 100 mg tablet TAKE 1 TABLET BY MOUTH AS NEEDED FOR ERECTILE DYSFUNCTION 5 Tablet 5    colchicine (MITIGARE) 0.6 mg capsule Take 1 Capsule by mouth daily. 30 Capsule 5    allopurinoL (ZYLOPRIM) 300 mg tablet Take 1 tablet by mouth once daily 90 Tablet 1    triamcinolone acetonide (KENALOG) 0.1 % topical cream APPLY  CREAM EXTERNALLY TO AFFECTED AREA TWICE DAILY 45 g 1    DULoxetine (CYMBALTA) 20 mg capsule Take 20 mg by mouth daily.  QUEtiapine SR (SEROquel XR) 50 mg sr tablet Take 50 mg by mouth nightly.  magnesium 250 mg tab Take  by mouth.  aspirin 81 mg chewable tablet Take 81 mg by mouth daily. Trial meloxicam.  JULIO indocin    712  Subjective:      Wants to see specialist about his hands and this is recommended by his . HS aches like a tooth ache. We discussed the expected course, resolution and complications of the diagnosis(es) in detail. Medication risks, benefits, costs, interactions, and alternatives were discussed as indicated.   I advised him to contact the office if his condition worsens, changes or fails to improve as anticipated. He expressed understanding with the diagnosis(es) and plan. Pursuant to the emergency declaration under the [facility], [facility] waiver authority and the [employer] Act, this Virtual  Visit was conducted, with patient's consent, to reduce the patient's risk of exposure to COVID-19 and provide continuity of care for an established patient. Services were provided through a video synchronous discussion virtually to substitute for in-person clinic visit.     Roc Obrien MD

## 2021-07-14 NOTE — PROGRESS NOTES
HISTORY OF PRESENT ILLNESS  Evelyn Sprague is a 53 y.o. male. Arthritis  The history is provided by the patient. This is a new problem. Episode onset: few mo. The problem occurs constantly. Pertinent negatives include no chest pain and no shortness of breath. Associated symptoms comments: Hears voices. Treatments tried: gout Rx. The treatment provided no relief. wife there    Review of Systems   Respiratory: Negative for shortness of breath. Cardiovascular: Negative for chest pain. Musculoskeletal: Positive for joint pain. Negative for falls. Sweling feet knucles   Psychiatric/Behavioral: Negative for depression. Voices seeing Paul Bauer     Current Outpatient Medications   Medication Sig Dispense Refill    sildenafil citrate (VIAGRA) 100 mg tablet TAKE 1 TABLET BY MOUTH AS NEEDED FOR ERECTILE DYSFUNCTION 5 Tablet 5    colchicine (MITIGARE) 0.6 mg capsule Take 1 Capsule by mouth daily. 30 Capsule 5    allopurinoL (ZYLOPRIM) 300 mg tablet Take 1 tablet by mouth once daily 90 Tablet 1    triamcinolone acetonide (KENALOG) 0.1 % topical cream APPLY  CREAM EXTERNALLY TO AFFECTED AREA TWICE DAILY 45 g 1    pravastatin (PRAVACHOL) 40 mg tablet Take 1 tablet by mouth nightly 30 Tab 5    indomethacin (INDOCIN) 50 mg capsule TAKE 1 CAPSULE BY MOUTH THREE TIMES DAILY AS NEEDED FOR 90 DAYS 90 Cap 3    DULoxetine (CYMBALTA) 20 mg capsule Take 20 mg by mouth daily.  losartan (COZAAR) 100 mg tablet Take 1 Tab by mouth daily. 30 Tab 11    QUEtiapine SR (SEROquel XR) 50 mg sr tablet Take 50 mg by mouth nightly.  omeprazole (PRILOSEC) 40 mg capsule Take 1 Cap by mouth daily. 30 Cap 5    hydroCHLOROthiazide (HYDRODIURIL) 25 mg tablet Take 0.5 Tabs by mouth daily. 60 Tab 2    magnesium 250 mg tab Take  by mouth.  aspirin 81 mg chewable tablet Take 81 mg by mouth daily.        No Known Allergies    Social History     Socioeconomic History    Marital status:      Spouse name: Not on file    Number of children: Not on file    Years of education: Not on file    Highest education level: Not on file   Occupational History    Occupation: unemployed   Tobacco Use    Smoking status: Current Every Day Smoker     Packs/day: 0.05     Years: 32.00     Pack years: 1.60     Types: Cigarettes     Last attempt to quit: 2019     Years since quittin.5    Smokeless tobacco: Never Used   Vaping Use    Vaping Use: Never used   Substance and Sexual Activity    Alcohol use: Not Currently     Alcohol/week: 1.0 standard drinks     Types: 1 Cans of beer per week     Comment: occ    Drug use: Never    Sexual activity: Yes     Partners: Female   Other Topics Concern    Not on file   Social History Narrative    Not on file     Social Determinants of Health     Financial Resource Strain:     Difficulty of Paying Living Expenses:    Food Insecurity:     Worried About Running Out of Food in the Last Year:     [address] in the Last Year:    Transportation Needs:     Lack of Transportation (Medical):  Lack of Transportation (Non-Medical):    Physical Activity:     Days of Exercise per Week:     Minutes of Exercise per Session:    Stress:     Feeling of Stress :    Social Connections:     Frequency of Communication with Friends and Family:     Frequency of Social Gatherings with Friends and Family:     Attends Protestant Services:     Active Member of Clubs or Organizations:     Attends Club or Organization Meetings:     Marital Status:    Intimate Partner Violence:     Fear of Current or Ex-Partner:     Emotionally Abused:     Physically Abused:     Sexually Abused:        Physical Exam   Sounds NAD    Labs from today were reviewed  no, labs done previously were reviewed  yes, Labs done in ER were reviewed  yes, Additional labs are ordered  yes,        ASSESSMENT and PLAN  Encounter Diagnoses   Name Primary?     Hand arthritis Yes    Idiopathic gout, unspecified chronicity, unspecified site     Essential hypertension     Moderate major depression ([facility] [address]     Hypercholesteremia     Acute idiopathic gout of right foot     Gout of left foot, unspecified cause, unspecified chronicity      Orders Placed This Encounter    XR FOOT LT MIN 3 V     Standing Status:   Future     Standing Expiration Date:   8/14/2022     Scheduling Instructions:      [facility]     Order Specific Question:   Reason for Exam     Answer:   gout left foot    XR FOOT RT MIN 3 V     Standing Status:   Future     Standing Expiration Date:   8/14/2022    REFERRAL TO RHEUMATOLOGY     Referral Priority:   Routine     Referral Type:   Consultation     Referral Reason:   Specialty Services Required     Referral Location:   [facility]     Referred to Provider:   John Lora MD    meloxicam (MOBIC) 15 mg tablet     Sig: Take 1 Tablet by mouth daily. For hand arthritis     Dispense:  30 Tablet     Refill:  5     Current Outpatient Medications   Medication Sig Dispense Refill    meloxicam (MOBIC) 15 mg tablet Take 1 Tablet by mouth daily. For hand arthritis 30 Tablet 5    sildenafil citrate (VIAGRA) 100 mg tablet TAKE 1 TABLET BY MOUTH AS NEEDED FOR ERECTILE DYSFUNCTION 5 Tablet 5    colchicine (MITIGARE) 0.6 mg capsule Take 1 Capsule by mouth daily. 30 Capsule 5    allopurinoL (ZYLOPRIM) 300 mg tablet Take 1 tablet by mouth once daily 90 Tablet 1    triamcinolone acetonide (KENALOG) 0.1 % topical cream APPLY  CREAM EXTERNALLY TO AFFECTED AREA TWICE DAILY 45 g 1    pravastatin (PRAVACHOL) 40 mg tablet Take 1 tablet by mouth nightly 30 Tab 5    DULoxetine (CYMBALTA) 20 mg capsule Take 20 mg by mouth daily.  losartan (COZAAR) 100 mg tablet Take 1 Tab by mouth daily. 30 Tab 11    QUEtiapine SR (SEROquel XR) 50 mg sr tablet Take 50 mg by mouth nightly.  omeprazole (PRILOSEC) 40 mg capsule Take 1 Cap by mouth daily.  30 Cap 5    hydroCHLOROthiazide (HYDRODIURIL) 25 mg tablet Take 0.5 Tabs by mouth daily. 60 Tab 2    magnesium 250 mg tab Take  by mouth.  aspirin 81 mg chewable tablet Take 81 mg by mouth daily.        Discussed referral. OK  F/up prn

## 2021-07-15 RX ORDER — LOSARTAN POTASSIUM 100 MG/1
100 TABLET ORAL DAILY
Qty: 90 TABLET | Refills: 3 | Status: SHIPPED | OUTPATIENT
Start: 2021-07-15 | End: 2021-11-04 | Stop reason: ALTCHOICE

## 2021-07-26 ENCOUNTER — VIRTUAL VISIT (OUTPATIENT)
Dept: INTERNAL MEDICINE CLINIC | Age: 51
End: 2021-07-26
Payer: COMMERCIAL

## 2021-07-26 DIAGNOSIS — I10 ESSENTIAL HYPERTENSION: Primary | ICD-10-CM

## 2021-07-26 DIAGNOSIS — E78.00 HYPERCHOLESTEREMIA: ICD-10-CM

## 2021-07-26 DIAGNOSIS — F32.1 MODERATE MAJOR DEPRESSION (HCC): ICD-10-CM

## 2021-07-26 DIAGNOSIS — M1A.0610 CHRONIC IDIOPATHIC GOUT OF BOTH KNEES AND BOTH FEET: ICD-10-CM

## 2021-07-26 DIAGNOSIS — M19.049 HAND ARTHRITIS: ICD-10-CM

## 2021-07-26 DIAGNOSIS — M1A.0710 CHRONIC IDIOPATHIC GOUT OF BOTH KNEES AND BOTH FEET: ICD-10-CM

## 2021-07-26 DIAGNOSIS — R73.9 ELEVATED BLOOD SUGAR: ICD-10-CM

## 2021-07-26 DIAGNOSIS — K76.0 FATTY LIVER: ICD-10-CM

## 2021-07-26 DIAGNOSIS — M1A.0720 CHRONIC IDIOPATHIC GOUT OF BOTH KNEES AND BOTH FEET: ICD-10-CM

## 2021-07-26 DIAGNOSIS — M1A.0620 CHRONIC IDIOPATHIC GOUT OF BOTH KNEES AND BOTH FEET: ICD-10-CM

## 2021-07-26 PROCEDURE — 99214 OFFICE O/P EST MOD 30 MIN: CPT | Performed by: FAMILY MEDICINE

## 2021-07-26 RX ORDER — MELOXICAM 15 MG/1
15 TABLET ORAL DAILY
Qty: 90 TABLET | Refills: 1 | Status: SHIPPED | OUTPATIENT
Start: 2021-07-26 | End: 2021-08-25

## 2021-07-26 NOTE — PROGRESS NOTES
Chief Complaint   Patient presents with    Hand Pain     hand arthritis FU     Patient is aware that this is a Virtual Visit or Phone Call Only doctor's visit. Patient has not been out of the country in (14 months), NO diarrhea, NO cough, NO chest conjestion, NO temp. Pt has not been around anyone with these symptoms. Health Maintenance reviewed. I have reviewed the patient's medical history in detail and updated the computerized patient record. 1. Have you been to the ER, urgent care clinic since your last visit? no Hospitalized since your last visit? no     2. Have you seen or consulted any other health care providers outside of the [address] [place] [address] since your last visit? no Include any pap smears or colon screening. Encouraged pt to discuss pt's wishes with spouse/partner/family and bring them in the next appt to follow thru with the Advanced Directive      Fall Risk Assessment, last 12 mths 2/18/2021   Able to walk? Yes   Fall in past 12 months? 0   Do you feel unsteady?  0   Are you worried about falling 0       3 most recent PHQ Screens 7/14/2021   Little interest or pleasure in doing things Several days   Feeling down, depressed, irritable, or hopeless Several days   Total Score PHQ 2 2   Trouble falling or staying asleep, or sleeping too much -   Feeling tired or having little energy -   Poor appetite, weight loss, or overeating -   Feeling bad about yourself - or that you are a failure or have let yourself or your family down -   Trouble concentrating on things such as school, work, reading, or watching TV -   Moving or speaking so slowly that other people could have noticed; or the opposite being so fidgety that others notice -   Thoughts of being better off dead, or hurting yourself in some way -   PHQ 9 Score -   How difficult have these problems made it for you to do your work, take care of your home and get along with others -       Abuse Screening Questionnaire 7/14/2021 Do you ever feel afraid of your partner? N   Are you in a relationship with someone who physically or mentally threatens you? N   Is it safe for you to go home?  Y       ADL Assessment 7/14/2021   Feeding yourself No Help Needed   Getting from bed to chair No Help Needed   Getting dressed No Help Needed   Bathing or showering No Help Needed   Walk across the room (includes cane/walker) No Help Needed   Using the telphone No Help Needed   Taking your medications No Help Needed   Preparing meals No Help Needed   Managing money (expenses/bills) No Help Needed   Moderately strenuous housework (laundry) No Help Needed   Shopping for personal items (toiletries/medicines) No Help Needed   Shopping for groceries No Help Needed   Driving No Help Needed   Climbing a flight of stairs No Help Needed   Getting to places beyond walking distances No Help Needed

## 2021-07-26 NOTE — PROGRESS NOTES
Subjective:     Jesu De La Cruz is a 53 y.o. male who presents for follow up of hypertension. New concerns: labs, toes burn, check for DM  Labs from today were reviewed  no, labs done previously were reviewed  yes, Labs done in ER were reviewed  yes, Additional labs are ordered  yes,        Diet and Lifestyle: smoker 2/per day    Cardiovascular ROS:   Reports taking blood pressure medications without side affects. No complaints of exertional chest pain, excessive shortness of breath or focal weakness. Minimal swelling in lower legs or dizziness with standing. Review of Systems, additional:  Pertinent items are noted in HPI. Patient Active Problem List    Diagnosis Date Noted    Fatty liver 09/87/5652    Helicobacter pylori infection 04/15/2020    Peripheral vascular disease ([facility] [address] 01/03/2020    Essential hypertension 10/01/2019    Hypercholesteremia 10/01/2019    Eczema 10/01/2019    Gout 10/01/2019    Moderate major depression ([facility] [address] 10/01/2019     Current Outpatient Medications   Medication Sig Dispense Refill    losartan (COZAAR) 100 mg tablet Take 1 Tablet by mouth daily. 90 Tablet 3    meloxicam (MOBIC) 15 mg tablet Take 1 Tablet by mouth daily. For hand arthritis 30 Tablet 5    pravastatin (PRAVACHOL) 40 mg tablet Take 1 tablet by mouth nightly 30 Tablet 3    omeprazole (PRILOSEC) 40 mg capsule Take 1 Capsule by mouth daily. 90 Capsule 3    hydroCHLOROthiazide (HYDRODIURIL) 25 mg tablet Take 0.5 Tablets by mouth daily. 60 Tablet 2    sildenafil citrate (VIAGRA) 100 mg tablet TAKE 1 TABLET BY MOUTH AS NEEDED FOR ERECTILE DYSFUNCTION 5 Tablet 5    colchicine (MITIGARE) 0.6 mg capsule Take 1 Capsule by mouth daily.  30 Capsule 5    allopurinoL (ZYLOPRIM) 300 mg tablet Take 1 tablet by mouth once daily 90 Tablet 1    triamcinolone acetonide (KENALOG) 0.1 % topical cream APPLY  CREAM EXTERNALLY TO AFFECTED AREA TWICE DAILY 45 g 1    DULoxetine (CYMBALTA) 20 mg capsule Take 20 mg by mouth daily.      QUEtiapine SR (SEROquel XR) 50 mg sr tablet Take 50 mg by mouth nightly.  magnesium 250 mg tab Take  by mouth.  aspirin 81 mg chewable tablet Take 81 mg by mouth daily. No Known Allergies  Social History     Tobacco Use    Smoking status: Current Every Day Smoker     Packs/day: 0.05     Years: 32.00     Pack years: 1.60     Types: Cigarettes     Last attempt to quit: 2019     Years since quittin.6    Smokeless tobacco: Never Used   Substance Use Topics    Alcohol use: Not Currently     Alcohol/week: 1.0 standard drinks     Types: 1 Cans of beer per week     Comment: occ        Lab Results   Component Value Date/Time    WBC 7.1 2021 01:53 PM    HGB 15.0 2021 01:53 PM    HCT 45.5 2021 01:53 PM    PLATELET 165  01:53 PM    MCV 82 2021 01:53 PM       Lab Results   Component Value Date/Time    ALT (SGPT) 29 2021 01:53 PM    Alk. phosphatase 64 2021 01:53 PM    Bilirubin, total 0.3 2021 01:53 PM    Albumin 4.5 2021 01:53 PM    Protein, total 7.8 2021 01:53 PM    PLATELET 833  01:53 PM     Lab Results   Component Value Date/Time    GFR est non-AA 74 2021 01:53 PM    GFRNA, POC >60 09/15/2020 12:30 PM    GFR est AA 86 2021 01:53 PM    GFRAA, POC >60 09/15/2020 12:30 PM    Creatinine 1.15 2021 01:53 PM    Creatinine (POC) 1.0 09/15/2020 12:30 PM    BUN 15 2021 01:53 PM    Sodium 136 2021 01:53 PM    Potassium 4.0 2021 01:53 PM    Chloride 99 2021 01:53 PM    CO2 22 2021 01:53 PM    Magnesium 1.9 2020 12:07 PM     Lab Results   Component Value Date/Time    TSH 2.380 2021 01:53 PM      Lab Results   Component Value Date/Time    Glucose 152 (H) 2021 01:53 PM             Objective:     Physical exam significant for the following:     SOunds  There were no vitals taken for this visit. WD WN male NAD    .    Assessment/Plan:     hypertension stable. hctz making gout worse? ICD-10-CM ICD-9-CM    1. Essential hypertension  V61 650.8 METABOLIC PANEL, COMPREHENSIVE   2. Hand arthritis  M19.049 716.94 meloxicam (MOBIC) 15 mg tablet   3. Chronic idiopathic gout of both knees and both feet  M1A.0610 274.02 URIC ACID    M1A.0720  SED RATE (ESR)    M1A.0620      M1A.0710     4. Moderate major depression (HCC)  F32.1 296.22    5. Elevated blood sugar  R73.9 790.29 HEMOGLOBIN A1C WITH EAG   6. Hypercholesteremia  E78.00 272.0 LIPID PANEL   7. Fatty liver  K76.0 571.8 HEPATITIS C AB, RFLX TO QT BY PCR       Orders Placed This Encounter    URIC ACID     Standing Status:   Future     Standing Expiration Date:   1/26/2022    HEMOGLOBIN A1C WITH EAG     Standing Status:   Future     Standing Expiration Date:   7/26/2022    SED RATE (ESR)     Standing Status:   Future     Standing Expiration Date:   6/69/8140    METABOLIC PANEL, COMPREHENSIVE     Standing Status:   Future     Standing Expiration Date:   1/26/2022    LIPID PANEL     Standing Status:   Future     Standing Expiration Date:   1/26/2022    HEPATITIS C AB, RFLX TO QT BY PCR     Standing Status:   Future     Standing Expiration Date:   1/26/2022    meloxicam (MOBIC) 15 mg tablet     Sig: Take 1 Tablet by mouth daily. For hand arthritis     Dispense:  90 Tablet     Refill:  1     Current Outpatient Medications   Medication Sig Dispense Refill    meloxicam (MOBIC) 15 mg tablet Take 1 Tablet by mouth daily. For hand arthritis 90 Tablet 1    losartan (COZAAR) 100 mg tablet Take 1 Tablet by mouth daily. 90 Tablet 3    pravastatin (PRAVACHOL) 40 mg tablet Take 1 tablet by mouth nightly 30 Tablet 3    omeprazole (PRILOSEC) 40 mg capsule Take 1 Capsule by mouth daily. 90 Capsule 3    sildenafil citrate (VIAGRA) 100 mg tablet TAKE 1 TABLET BY MOUTH AS NEEDED FOR ERECTILE DYSFUNCTION 5 Tablet 5    colchicine (MITIGARE) 0.6 mg capsule Take 1 Capsule by mouth daily.  30 Capsule 5    allopurinoL (ZYLOPRIM) 300 mg tablet Take 1 tablet by mouth once daily 90 Tablet 1    triamcinolone acetonide (KENALOG) 0.1 % topical cream APPLY  CREAM EXTERNALLY TO AFFECTED AREA TWICE DAILY 45 g 1    DULoxetine (CYMBALTA) 20 mg capsule Take 20 mg by mouth daily.  QUEtiapine SR (SEROquel XR) 50 mg sr tablet Take 50 mg by mouth nightly.  magnesium 250 mg tab Take  by mouth.  aspirin 81 mg chewable tablet Take 81 mg by mouth daily. We discussed the covid vaccine. Discussed availability and how we prioritize patients to get it. Resources discussed. We discussed how important it is to get the vaccine and the relatively low side affects from it. Preston Kenan, who was evaluated through a synchronous (real-time) audio only encounter, and/or his healthcare decision maker, is aware that it is a billable service, with coverage as determined by his insurance carrier. He provided verbal consent to proceed: Yes, and patient identification was verified. This visit was conducted pursuant to the emergency declaration under the [address] [place] [address] authority and the [employer] General Act. A caregiver was present when appropriate. Ability to conduct physical exam was limited. The patient was located in a state where the provider was credentialed to provide care.      --Sheri Roberts MD on 7/26/2021 at 9:36 AM        F/u few weeks

## 2021-08-05 LAB
ALBUMIN SERPL-MCNC: 4.6 G/DL (ref 4–5)
ALBUMIN/GLOB SERPL: 1.5 {RATIO} (ref 1.2–2.2)
ALP SERPL-CCNC: 59 IU/L (ref 48–121)
ALT SERPL-CCNC: 25 IU/L (ref 0–44)
AST SERPL-CCNC: 20 IU/L (ref 0–40)
BILIRUB SERPL-MCNC: 0.4 MG/DL (ref 0–1.2)
BUN SERPL-MCNC: 10 MG/DL (ref 6–24)
BUN/CREAT SERPL: 10 (ref 9–20)
CALCIUM SERPL-MCNC: 9.4 MG/DL (ref 8.7–10.2)
CHLORIDE SERPL-SCNC: 104 MMOL/L (ref 96–106)
CHOLEST SERPL-MCNC: 280 MG/DL (ref 100–199)
CO2 SERPL-SCNC: 23 MMOL/L (ref 20–29)
CREAT SERPL-MCNC: 1.03 MG/DL (ref 0.76–1.27)
ERYTHROCYTE [SEDIMENTATION RATE] IN BLOOD BY WESTERGREN METHOD: 33 MM/HR (ref 0–15)
EST. AVERAGE GLUCOSE BLD GHB EST-MCNC: 160 MG/DL
GLOBULIN SER CALC-MCNC: 3.1 G/DL (ref 1.5–4.5)
GLUCOSE SERPL-MCNC: 140 MG/DL (ref 65–99)
HBA1C MFR BLD: 7.2 % (ref 4.8–5.6)
HCV AB S/CO SERPL IA: <0.1 S/CO RATIO (ref 0–0.9)
HCV AB SERPL QL IA: NORMAL
HDLC SERPL-MCNC: 49 MG/DL
IMP & REVIEW OF LAB RESULTS: NORMAL
LDLC SERPL CALC-MCNC: 186 MG/DL (ref 0–99)
POTASSIUM SERPL-SCNC: 4.5 MMOL/L (ref 3.5–5.2)
PROT SERPL-MCNC: 7.7 G/DL (ref 6–8.5)
SODIUM SERPL-SCNC: 142 MMOL/L (ref 134–144)
TRIGL SERPL-MCNC: 237 MG/DL (ref 0–149)
URATE SERPL-MCNC: 6 MG/DL (ref 3.8–8.4)
VLDLC SERPL CALC-MCNC: 45 MG/DL (ref 5–40)

## 2021-08-19 ENCOUNTER — TELEPHONE (OUTPATIENT)
Dept: INTERNAL MEDICINE CLINIC | Age: 51
End: 2021-08-19

## 2021-08-19 NOTE — TELEPHONE ENCOUNTER
Didier calling to speak to someone about getting set up for cardiology. He went to ER and said that Dr Addison Salcido is suggesting this.

## 2021-08-25 ENCOUNTER — VIRTUAL VISIT (OUTPATIENT)
Dept: INTERNAL MEDICINE CLINIC | Age: 51
End: 2021-08-25
Payer: COMMERCIAL

## 2021-08-25 DIAGNOSIS — M19.049 HAND ARTHRITIS: ICD-10-CM

## 2021-08-25 DIAGNOSIS — M1A.0710 CHRONIC IDIOPATHIC GOUT OF BOTH KNEES AND BOTH FEET: ICD-10-CM

## 2021-08-25 DIAGNOSIS — F32.1 MODERATE MAJOR DEPRESSION (HCC): ICD-10-CM

## 2021-08-25 DIAGNOSIS — E11.65 TYPE 2 DIABETES MELLITUS WITH HYPERGLYCEMIA, WITHOUT LONG-TERM CURRENT USE OF INSULIN (HCC): ICD-10-CM

## 2021-08-25 DIAGNOSIS — R07.9 CHEST PAIN, UNSPECIFIED TYPE: Primary | ICD-10-CM

## 2021-08-25 DIAGNOSIS — E78.00 HYPERCHOLESTEREMIA: ICD-10-CM

## 2021-08-25 DIAGNOSIS — M1A.0720 CHRONIC IDIOPATHIC GOUT OF BOTH KNEES AND BOTH FEET: ICD-10-CM

## 2021-08-25 DIAGNOSIS — M1A.0610 CHRONIC IDIOPATHIC GOUT OF BOTH KNEES AND BOTH FEET: ICD-10-CM

## 2021-08-25 DIAGNOSIS — M1A.0620 CHRONIC IDIOPATHIC GOUT OF BOTH KNEES AND BOTH FEET: ICD-10-CM

## 2021-08-25 PROCEDURE — 99214 OFFICE O/P EST MOD 30 MIN: CPT | Performed by: FAMILY MEDICINE

## 2021-08-25 RX ORDER — NABUMETONE 750 MG/1
750 TABLET, FILM COATED ORAL
Qty: 60 TABLET | Refills: 2 | Status: SHIPPED | OUTPATIENT
Start: 2021-08-25 | End: 2021-12-30 | Stop reason: SDUPTHER

## 2021-08-25 RX ORDER — COLCHICINE 0.6 MG/1
0.6 CAPSULE ORAL DAILY
Qty: 30 CAPSULE | Refills: 5 | Status: SHIPPED | OUTPATIENT
Start: 2021-08-25 | End: 2021-12-30 | Stop reason: SDUPTHER

## 2021-08-25 RX ORDER — PRAVASTATIN SODIUM 40 MG/1
TABLET ORAL
Qty: 30 TABLET | Refills: 11 | Status: SHIPPED | OUTPATIENT
Start: 2021-08-25 | End: 2021-08-25 | Stop reason: SDUPTHER

## 2021-08-25 RX ORDER — PRAVASTATIN SODIUM 40 MG/1
TABLET ORAL
Qty: 90 TABLET | Refills: 3 | Status: SHIPPED | OUTPATIENT
Start: 2021-08-25 | End: 2022-09-29

## 2021-08-25 NOTE — PROGRESS NOTES
Rosenda Young is a 53 y.o. male who was phone evaluated on 8/25/2021. Consent:  He and/or his healthcare decision maker is aware that this patient-initiated Telehealth encounter is a billable service, with coverage as determined by his insurance carrier. He is aware that he may receive a bill and has provided verbal consent to proceed: Yes    I was in the office while conducting this encounter. Assessment & Plan:       ICD-10-CM ICD-9-CM    1. Chest pain, unspecified type  R07.9 786.50 REFERRAL TO CARDIOLOGY   2. Hand arthritis  M19.049 716.94    3. Hypercholesteremia  E78.00 272.0 pravastatin (PRAVACHOL) 40 mg tablet      DISCONTINUED: pravastatin (PRAVACHOL) 40 mg tablet   4. Type 2 diabetes mellitus with hyperglycemia, without long-term current use of insulin (HCC)  E11.65 250.00      790.29      Orders Placed This Encounter    REFERRAL TO CARDIOLOGY     Referral Priority:   Routine     Referral Type:   Consultation     Referral Reason:   Specialty Services Required     Referred to Provider:   Ben Shultz MD     Number of Visits Requested:   1    DISCONTD: pravastatin (PRAVACHOL) 40 mg tablet     Sig: Take 1 tablet by mouth nightly     Dispense:  30 Tablet     Refill:  11    colchicine (MITIGARE) 0.6 mg capsule     Sig: Take 1 Capsule by mouth daily. Dispense:  30 Capsule     Refill:  5     As needed    pravastatin (PRAVACHOL) 40 mg tablet     Sig: Take 1 tablet by mouth nightly     Dispense:  90 Tablet     Refill:  3    nabumetone (RELAFEN) 750 mg tablet     Sig: Take 1 Tablet by mouth two (2) times daily as needed for Pain. Dispense:  60 Tablet     Refill:  2       See patient instructions, went over them personally with the patient. Emphasized compliance. Questions answered. Patient states that they understand the plan of action and will call if there are any issues or misunderstandings.     F/up with rheum    Current Outpatient Medications   Medication Sig Dispense Refill    colchicine (MITIGARE) 0.6 mg capsule Take 1 Capsule by mouth daily. 30 Capsule 5    pravastatin (PRAVACHOL) 40 mg tablet Take 1 tablet by mouth nightly 90 Tablet 3    nabumetone (RELAFEN) 750 mg tablet Take 1 Tablet by mouth two (2) times daily as needed for Pain. 60 Tablet 2    losartan (COZAAR) 100 mg tablet Take 1 Tablet by mouth daily. 90 Tablet 3    omeprazole (PRILOSEC) 40 mg capsule Take 1 Capsule by mouth daily. 90 Capsule 3    sildenafil citrate (VIAGRA) 100 mg tablet TAKE 1 TABLET BY MOUTH AS NEEDED FOR ERECTILE DYSFUNCTION 5 Tablet 5    allopurinoL (ZYLOPRIM) 300 mg tablet Take 1 tablet by mouth once daily 90 Tablet 1    triamcinolone acetonide (KENALOG) 0.1 % topical cream APPLY  CREAM EXTERNALLY TO AFFECTED AREA TWICE DAILY 45 g 1    DULoxetine (CYMBALTA) 20 mg capsule Take 20 mg by mouth daily.  QUEtiapine SR (SEROquel XR) 50 mg sr tablet Take 100 mg by mouth nightly.  magnesium 250 mg tab Take  by mouth.  aspirin 81 mg chewable tablet Take 81 mg by mouth daily. 712  Subjective:      CP went to ER. Had it x a week. Told to F/up with me and cards. No cause ID'd. CP resolved. Did not find anything wrong with his heart. Stress test at [facility] 2019. No CP now. Joints cont pain 6-7 in AM, xryas arthritis meloxicam helping min. Labs from today were reviewed  no, labs done previously were reviewed  yes, Labs done in ER were reviewed  yes, Additional labs are ordered  no,    A1C = Dm mild, new Dx  Mood OK    Current Outpatient Medications   Medication Sig Dispense Refill    colchicine (MITIGARE) 0.6 mg capsule Take 1 Capsule by mouth daily. 30 Capsule 5    pravastatin (PRAVACHOL) 40 mg tablet Take 1 tablet by mouth nightly 90 Tablet 3    meloxicam (MOBIC) 15 mg tablet Take 1 Tablet by mouth daily. For hand arthritis 90 Tablet 1    losartan (COZAAR) 100 mg tablet Take 1 Tablet by mouth daily. 90 Tablet 3    omeprazole (PRILOSEC) 40 mg capsule Take 1 Capsule by mouth daily. 90 Capsule 3    sildenafil citrate (VIAGRA) 100 mg tablet TAKE 1 TABLET BY MOUTH AS NEEDED FOR ERECTILE DYSFUNCTION 5 Tablet 5    allopurinoL (ZYLOPRIM) 300 mg tablet Take 1 tablet by mouth once daily 90 Tablet 1    triamcinolone acetonide (KENALOG) 0.1 % topical cream APPLY  CREAM EXTERNALLY TO AFFECTED AREA TWICE DAILY 45 g 1    DULoxetine (CYMBALTA) 20 mg capsule Take 20 mg by mouth daily.  QUEtiapine SR (SEROquel XR) 50 mg sr tablet Take 100 mg by mouth nightly.  magnesium 250 mg tab Take  by mouth.  aspirin 81 mg chewable tablet Take 81 mg by mouth daily. No Known Allergies  Social History     Socioeconomic History    Marital status:      Spouse name: Not on file    Number of children: Not on file    Years of education: Not on file    Highest education level: Not on file   Occupational History    Occupation: unemployed   Tobacco Use    Smoking status: Former Smoker     Packs/day: 0.75     Years: 32.00     Pack years: 24.00     Types: Cigarettes     Quit date: 2021     Years since quittin.1    Smokeless tobacco: Never Used   Vaping Use    Vaping Use: Never used   Substance and Sexual Activity    Alcohol use: Not Currently     Alcohol/week: 1.0 standard drinks     Types: 1 Cans of beer per week     Comment: occ    Drug use: Never    Sexual activity: Yes     Partners: Female   Other Topics Concern    Not on file   Social History Narrative    Not on file     Social Determinants of Health     Financial Resource Strain:     Difficulty of Paying Living Expenses:    Food Insecurity:     Worried About [address] in the Last Year:     [address] in the Last Year:    Transportation Needs:     Lack of Transportation (Medical):      Lack of Transportation (Non-Medical):    Physical Activity:     Days of Exercise per Week:     Minutes of Exercise per Session:    Stress:     Feeling of Stress :    Social Connections:     Frequency of Communication with Friends and Family:     Frequency of Social Gatherings with Friends and Family:     Attends [employer] Services:     Active Member of Clubs or Organizations:     Attends Club or Organization Meetings:     Marital Status:    Intimate Partner Violence:     Fear of Current or Ex-Partner:     Emotionally Abused:     Physically Abused:     Sexually Abused: We discussed the expected course, resolution and complications of the diagnosis(es) in detail. Medication risks, benefits, costs, interactions, and alternatives were discussed as indicated. I advised him to contact the office if his condition worsens, changes or fails to improve as anticipated. He expressed understanding with the diagnosis(es) and plan. Pursuant to the emergency declaration under the [facility] waiver authority and the [employer] General Act, this Virtual  Visit was conducted, with patient's consent, to reduce the patient's risk of exposure to COVID-19 and provide continuity of care for an established patient. Services were provided through a video synchronous discussion virtually to substitute for in-person clinic visit.     Whit Angulo MD

## 2021-08-25 NOTE — PROGRESS NOTES
Chief Complaint   Patient presents with    Hypertension     ER FU     Patient is aware that this is a Virtual Visit or Phone Call Only doctor's visit. Patient has not been out of the country in (14 months), NO diarrhea, NO cough, NO chest conjestion, NO temp. Pt has not been around anyone with these symptoms. Health Maintenance reviewed. I have reviewed the patient's medical history in detail and updated the computerized patient record. 1. Have you been to the ER, urgent care clinic since your last visit? yes Hospitalized since your last visit? Yes     2. Have you seen or consulted any other health care providers outside of the [address] [place] [address] since your last visit? Yes  Include any pap smears or colon screening. Encouraged pt to discuss pt's wishes with spouse/partner/family and bring them in the next appt to follow thru with the Advanced Directive      Fall Risk Assessment, last 12 mths 2/18/2021   Able to walk? Yes   Fall in past 12 months? 0   Do you feel unsteady?  0   Are you worried about falling 0       3 most recent PHQ Screens 8/25/2021   Little interest or pleasure in doing things Several days   Feeling down, depressed, irritable, or hopeless Several days   Total Score PHQ 2 2   Trouble falling or staying asleep, or sleeping too much -   Feeling tired or having little energy -   Poor appetite, weight loss, or overeating -   Feeling bad about yourself - or that you are a failure or have let yourself or your family down -   Trouble concentrating on things such as school, work, reading, or watching TV -   Moving or speaking so slowly that other people could have noticed; or the opposite being so fidgety that others notice -   Thoughts of being better off dead, or hurting yourself in some way -   PHQ 9 Score -   How difficult have these problems made it for you to do your work, take care of your home and get along with others -       Abuse Screening Questionnaire 8/25/2021   Do you ever feel afraid of your partner? N   Are you in a relationship with someone who physically or mentally threatens you? N   Is it safe for you to go home?  Y       ADL Assessment 8/25/2021   Feeding yourself No Help Needed   Getting from bed to chair No Help Needed   Getting dressed No Help Needed   Bathing or showering Help Needed   Walk across the room (includes cane/walker) No Help Needed   Using the telphone No Help Needed   Taking your medications No Help Needed   Preparing meals No Help Needed   Managing money (expenses/bills) No Help Needed   Moderately strenuous housework (laundry) No Help Needed   Shopping for personal items (toiletries/medicines) No Help Needed   Shopping for groceries Help Needed   Driving Help Needed   Climbing a flight of stairs Help Needed   Getting to places beyond walking distances Help Needed

## 2021-08-25 NOTE — PATIENT INSTRUCTIONS
Learning About Meal Planning for Diabetes  Why plan your meals? Meal planning can be a key part of managing diabetes. Planning meals and snacks with the right balance of carbohydrate, protein, and fat can help you keep your blood sugar at the target level you set with your doctor. You don't have to eat special foods. You can eat what your family eats, including sweets once in a while. But you do have to pay attention to how often you eat and how much you eat of certain foods. You may want to work with a dietitian or a certified diabetes educator. He or she can give you tips and meal ideas and can answer your questions about meal planning. This health professional can also help you reach a healthy weight if that is one of your goals. What plan is right for you? Your dietitian or diabetes educator may suggest that you start with the plate format or carbohydrate counting. The plate format  The plate format is a simple way to help you manage how you eat. You plan meals by learning how much space each food should take on a plate. Using the plate format helps you spread carbohydrate throughout the day. It can make it easier to keep your blood sugar level within your target range. It also helps you see if you're eating healthy portion sizes. To use the plate format, you put non-starchy vegetables on half your plate. Add meat or meat substitutes on one-quarter of the plate. Put a grain or starchy vegetable (such as brown rice or a potato) on the final quarter of the plate. You can add a small piece of fruit and some low-fat or fat-free milk or yogurt, depending on your carbohydrate goal for each meal.  Here are some tips for using the plate format:  · Make sure that you are not using an oversized plate. A 9-inch plate is best. Many restaurants use larger plates. · Get used to using the plate format at home. Then you can use it when you eat out. · Write down your questions about using the plate format.  Talk to your doctor, a dietitian, or a diabetes educator about your concerns. Carbohydrate counting  With carbohydrate counting, you plan meals based on the amount of carbohydrate in each food. Carbohydrate raises blood sugar higher and more quickly than any other nutrient. It is found in desserts, breads and cereals, and fruit. It's also found in starchy vegetables such as potatoes and corn, grains such as rice and pasta, and milk and yogurt. Spreading carbohydrate throughout the day helps keep your blood sugar levels within your target range. Your daily amount depends on several things, including your weight, how active you are, which diabetes medicines you take, and what your goals are for your blood sugar levels. A registered dietitian or diabetes educator can help you plan how much carbohydrate to include in each meal and snack. A guideline for your daily amount of carbohydrate is:  · 45 to 60 grams at each meal. That's about the same as 3 to 4 carbohydrate servings. · 15 to 20 grams at each snack. That's about the same as 1 carbohydrate serving. The Nutrition Facts label on packaged foods tells you how much carbohydrate is in a serving of the food. First, look at the serving size on the food label. Is that the amount you eat in a serving? All of the nutrition information on a food label is based on that serving size. So if you eat more or less than that, you'll need to adjust the other numbers. Total carbohydrate is the next thing you need to look for on the label. If you count carbohydrate servings, one serving of carbohydrate is 15 grams. For foods that don't come with labels, such as fresh fruits and vegetables, you'll need a guide that lists carbohydrate in these foods. Ask your doctor, dietitian, or diabetes educator about books or other nutrition guides you can use.   If you take insulin, you need to know how many grams of carbohydrate are in a meal. This lets you know how much rapid-acting insulin to take before you eat. If you use an insulin pump, you get a constant rate of insulin during the day. So the pump must be programmed at meals to give you extra insulin to cover the rise in blood sugar after meals. When you know how much carbohydrate you will eat, you can take the right amount of insulin. Or, if you always use the same amount of insulin, you need to make sure that you eat the same amount of carbohydrate at meals. If you need more help to understand carbohydrate counting and food labels, ask your doctor, dietitian, or diabetes educator. How can you plan healthy meals? Here are some tips to get started:  · Plan your meals a week at a time. Don't forget to include snacks too. · Use cookbooks or online recipes to plan several main meals. Plan some quick meals for busy nights. You also can double some recipes that freeze well. Then you can save half for other busy nights when you don't have time to cook. · Make sure you have the ingredients you need for your recipes. If you're running low on basic items, put these items on your shopping list too. · List foods that you use to make breakfasts, lunches, and snacks. List plenty of fruits and vegetables. · Post this list on the refrigerator. Add to it as you think of more things you need. · Take the list to the store to do your weekly shopping. Follow-up care is a key part of your treatment and safety. Be sure to make and go to all appointments, and call your doctor if you are having problems. It's also a good idea to know your test results and keep a list of the medicines you take. Where can you learn more? Go to http://www.gray.com/  Enter R432 in the search box to learn more about \"Learning About Meal Planning for Diabetes. \"  Current as of: August 31, 2020               Content Version: 12.8  © 8385-0615 Healthwise, Incorporated.    Care instructions adapted under license by [employer] (which disclaims liability or warranty for this information). If you have questions about a medical condition or this instruction, always ask your healthcare professional. [address] any warranty or liability for your use of this information. Learning About Carbohydrate (Carb) Counting and Eating Out When You Have Diabetes  Why plan your meals? Meal planning can be a key part of managing diabetes. Planning meals and snacks with the right balance of carbohydrate, protein, and fat can help you keep your blood sugar at the target level you set with your doctor. You don't have to eat special foods. You can eat what your family eats, including sweets once in a while. But you do have to pay attention to how often you eat and how much you eat of certain foods. You may want to work with a dietitian or a certified diabetes educator. He or she can give you tips and meal ideas and can answer your questions about meal planning. This health professional can also help you reach a healthy weight if that is one of your goals. What should you know about eating carbs? Managing the amount of carbohydrate (carbs) you eat is an important part of healthy meals when you have diabetes. Carbohydrate is found in many foods. · Learn which foods have carbs. And learn the amounts of carbs in different foods. ? Bread, cereal, pasta, and rice have about 15 grams of carbs in a serving. A serving is 1 slice of bread (1 ounce), ½ cup of cooked cereal, or 1/3 cup of cooked pasta or rice. ? Fruits have 15 grams of carbs in a serving. A serving is 1 small fresh fruit, such as an apple or orange; ½ of a banana; ½ cup of cooked or canned fruit; ½ cup of fruit juice; 1 cup of melon or raspberries; or 2 tablespoons of dried fruit. ? Milk and no-sugar-added yogurt have 15 grams of carbs in a serving. A serving is 1 cup of milk or 2/3 cup of no-sugar-added yogurt. ? Starchy vegetables have 15 grams of carbs in a serving.  A serving is ½ cup of mashed potatoes or sweet potato; 1 cup winter squash; ½ of a small baked potato; ½ cup of cooked beans; or ½ cup cooked corn or green peas. · Learn how much carbs to eat each day and at each meal. A dietitian or CDE can teach you how to keep track of the amount of carbs you eat. This is called carbohydrate counting. · If you are not sure how to count carbohydrate grams, use the Plate Method to plan meals. It is a good, quick way to make sure that you have a balanced meal. It also helps you spread carbs throughout the day. ? Divide your plate by types of foods. Put non-starchy vegetables on half the plate, meat or other protein food on one-quarter of the plate, and a grain or starchy vegetable in the final quarter of the plate. To this you can add a small piece of fruit and 1 cup of milk or yogurt, depending on how many carbs you are supposed to eat at a meal.  · Try to eat about the same amount of carbs at each meal. Do not \"save up\" your daily allowance of carbs to eat at one meal.  · Proteins have very little or no carbs per serving. Examples of proteins are beef, chicken, turkey, fish, eggs, tofu, cheese, cottage cheese, and peanut butter. A serving size of meat is 3 ounces, which is about the size of a deck of cards. Examples of meat substitute serving sizes (equal to 1 ounce of meat) are 1/4 cup of cottage cheese, 1 egg, 1 tablespoon of peanut butter, and ½ cup of tofu. How can you eat out and still eat healthy? · Learn to estimate the serving sizes of foods that have carbohydrate. If you measure food at home, it will be easier to estimate the amount in a serving of restaurant food. · If the meal you order has too much carbohydrate (such as potatoes, corn, or baked beans), ask to have a low-carbohydrate food instead. Ask for a salad or green vegetables. · If you use insulin, check your blood sugar before and after eating out to help you plan how much to eat in the future.   · If you eat more carbohydrate at a meal than you had planned, take a walk or do other exercise. This will help lower your blood sugar. What are some tips for eating healthy? · Limit saturated fat, such as the fat from meat and dairy products. This is a healthy choice because people who have diabetes are at higher risk of heart disease. So choose lean cuts of meat and nonfat or low-fat dairy products. Use olive or canola oil instead of butter or shortening when cooking. · Don't skip meals. Your blood sugar may drop too low if you skip meals and take insulin or certain medicines for diabetes. · Check with your doctor before you drink alcohol. Alcohol can cause your blood sugar to drop too low. Alcohol can also cause a bad reaction if you take certain diabetes medicines. Follow-up care is a key part of your treatment and safety. Be sure to make and go to all appointments, and call your doctor if you are having problems. It's also a good idea to know your test results and keep a list of the medicines you take. Where can you learn more? Go to http://www.sears.com/  Enter I147 in the search box to learn more about \"Learning About Carbohydrate (Carb) Counting and Eating Out When You Have Diabetes. \"  Current as of: August 31, 2020               Content Version: 12.8  © 2006-2021 Healthwise, Incorporated. Care instructions adapted under license by [employer] (which disclaims liability or warranty for this information). If you have questions about a medical condition or this instruction, always ask your healthcare professional. [place] [address] any warranty or liability for your use of this information. Learning About the Risk of Heart Attack and Stroke With Diabetes  How are diabetes, heart attack, and stroke connected? For some people, diabetes can cause problems that increase the risk of a heart attack or stroke. Many things can lead to a heart attack or stroke. These include high blood sugar, insulin resistance, high cholesterol, and high blood pressure. Lifestyle and genetics may also play a part. But here's the good news: The things you're doing to stay healthy with diabetes also help your heart and blood vessels. That means eating healthy foods, quitting smoking, and getting exercise. What increases your risk for heart attack and stroke? When you have diabetes, your risk for heart attack and stroke is even higher if you have:  · High blood pressure. It pushes blood through the arteries with too much force. Over time, this damages the walls of the arteries. · High cholesterol. It causes the buildup of a kind of fat inside the blood vessel walls. This buildup can lower blood flow to the heart muscle and raise your risk for having a heart attack or stroke. · Kidney damage. It shares many of the risk factors for heart attack and stroke (such as high blood sugar, high blood pressure, and high cholesterol). How do you keep your heart healthy when you have diabetes? Managing your diabetes and keeping your heart and blood vessels healthy are both important. Here are some things you can do. · Test your blood sugar levels and get your diabetes tests on schedule. Try to keep your numbers within your target range. · Keep track of your blood pressure. Your doctor will give you a goal that's right for you. If your blood pressure is high, your treatment may also include medicine. Changes in your lifestyle, such as staying at a healthy weight, may also help you lower your blood pressure. · Eat heart-healthy foods. These include fruits, vegetables, whole grains, fish, and low-fat or nonfat dairy foods. Limit sodium, alcohol, and sweets. · If your doctor recommends it, get more exercise. Walking is a good choice. Bit by bit, increase the amount you walk every day. Try for at least 30 minutes on most days of the week. · Don't smoke.    Smoking can make diabetes worse and increase your risk of heart attack or stroke. If you need help quitting, talk to your doctor about stop-smoking programs and medicines. These can increase your chances of quitting for good. · Think about taking medicines for your heart. For example, your doctor may suggest taking a statin or daily aspirin. Where can you learn more? Go to http://www.gray.com/  Enter A775 in the search box to learn more about \"Learning About the Risk of Heart Attack and Stroke With Diabetes. \"  Current as of: August 31, 2020               Content Version: 12.8  © 6705-7336 [employer]. Care instructions adapted under license by [employer] (which disclaims liability or warranty for this information). If you have questions about a medical condition or this instruction, always ask your healthcare professional. [place] [address] any warranty or liability for your use of this information. Nutrition Tips for Diabetes: After Your Visit  Your Care Instructions  A healthy diet is important to manage diabetes. It helps you lose weight (if you need to) and keep it off. It gives you the nutrition and energy your body needs and helps prevent heart disease. But a diet for diabetes does not mean that you have to eat special foods. You can eat what your family eats, including occasional sweets and other favorites. But you do have to pay attention to how often you eat and how much you eat of certain foods. The right plan for you will give you meals that help you keep your blood sugar at healthy levels. Try to eat a variety of foods and to spread carbohydrate throughout the day. Carbohydrate raises blood sugar higher and more quickly than any other nutrient does. Carbohydrate is found in sugar, breads and cereals, fruit, starchy vegetables such as potatoes and corn, and milk and yogurt.   You may want to work with a dietitian or diabetes educator to help you plan meals and snacks. A dietitian or diabetes educator also can help you lose weight if that is one of your goals. The following tips can help you enjoy your meals and stay healthy. Follow-up care is a key part of your treatment and safety. Be sure to make and go to all appointments, and call your doctor if you are having problems. Its also a good idea to know your test results and keep a list of the medicines you take. How can you care for yourself at home? · Learn which foods have carbohydrate and how much carbohydrate to eat. A dietitian or diabetes educator can help you learn to keep track of how much carbohydrate you eat. · Spread carbohydrate throughout the day. Eat some carbohydrate at all meals, but do not eat too much at any one time. · Plan meals to include food from all the food groups. These are the food groups and some example portion sizes:  ¨ Grains: 1 slice of bread (1 ounce), ½ cup of cooked cereal, and 1/3 cup of cooked pasta or rice. These have about 15 grams of carbohydrate in a serving. Choose whole grains such as whole wheat bread or crackers, oatmeal, and brown rice more often than refined grains. ¨ Fruit: 1 small fresh fruit, such as an apple or orange; ½ of a banana; ½ cup of chopped, cooked, or canned fruit; ½ cup of fruit juice; 1 cup of melon or raspberries; and 2 tablespoons of dried fruit. These have about 15 grams of carbohydrate in a serving. ¨ Dairy: 1 cup of nonfat or low-fat milk and 2/3 cup of plain yogurt. These have about 15 grams of carbohydrate in a serving. ¨ Protein foods: Beef, chicken, turkey, fish, eggs, tofu, cheese, cottage cheese, and peanut butter. A serving size of meat is 3 ounces, which is about the size of a deck of cards. Examples of meat substitute serving sizes (equal to 1 ounce of meat) are 1/4 cup of cottage cheese, 1 egg, 1 tablespoon of peanut butter, and ½ cup of tofu. These have very little or no carbohydrate per serving.   ¨ Vegetables: Starchy vegetables such as ½ cup of cooked dried beans, peas, potatoes, or corn have about 15 grams of carbohydrate. Nonstarchy vegetables have very little carbohydrate, such as 1 cup of raw leafy vegetables (such as spinach), ½ cup of other vegetables (cooked or chopped), and 3/4 cup of vegetable juice. · Use the plate format to plan meals. It is a good, quick way to make sure that you have a balanced meal. It also helps you spread carbohydrate throughout the day. You divide your plate by types of foods. Put vegetables on half the plate, meat or meat substitutes on one-quarter of the plate, and a grain or starchy vegetable (such as brown rice or a potato) in the final quarter of the plate. To this you can add a small piece of fruit and 1 cup of milk or yogurt, depending on how much carbohydrate you are supposed to eat at a meal.  · Talk to your dietitian or diabetes educator about ways to add limited amounts of sweets into your meal plan. You can eat these foods now and then, as long as you include the amount of carbohydrate they have in your daily carbohydrate allowance. · If you drink alcohol, limit it to no more than 1 drink a day for women and 2 drinks a day for men. If you are pregnant, no amount of alcohol is known to be safe. · Protein, fat, and fiber do not raise blood sugar as much as carbohydrate does. If you eat a lot of these nutrients in a meal, your blood sugar will rise more slowly than it would otherwise. · Limit saturated fats, such as those from meat and dairy products. Try to replace it with monounsaturated fat, such as olive oil. This is a healthier choice because people who have diabetes are at higher-than-average risk of heart disease. But use a modest amount of olive oil. A tablespoon of olive oil has 14 grams of fat and 120 calories. · Exercise lowers blood sugar. If you take insulin by shots or pump, you can use less than you would if you were not exercising. Keep in mind that timing matters.  If you exercise within 1 hour after a meal, your body may need less insulin for that meal than it would if you exercised 3 hours after the meal. Test your blood sugar to find out how exercise affects your need for insulin. · Exercise on most days of the week. Aim for at least 30 minutes. Exercise helps you stay at a healthy weight and helps your body use insulin. Walking is an easy way to get exercise. Gradually increase the amount you walk every day. You also may want to swim, bike, or do other activities. When you eat out  · Learn to estimate the serving sizes of foods that have carbohydrate. If you measure food at home, it will be easier to estimate the amount in a serving of restaurant food. · If the meal you order has too much carbohydrate (such as potatoes, corn, or baked beans), ask to have a low-carbohydrate food instead. Ask for a salad or green vegetables. · If you use insulin, check your blood sugar before and after eating out to help you plan how much to eat in the future. · If you eat more carbohydrate at a meal than you had planned, take a walk or do other exercise. This will help lower your blood sugar. Where can you learn more? Go to "[employer].be  Enter M269 in the search box to learn more about \"Nutrition Tips for Diabetes: After Your Visit. \"   © 5282-6732 Healthwise, Incorporated. Care instructions adapted under license by [employer] (which disclaims liability or warranty for this information). This care instruction is for use with your licensed healthcare professional. If you have questions about a medical condition or this instruction, always ask your healthcare professional. [place] [address] any warranty or liability for your use of this information.   Content Version: 37.7.685088; Current as of: June 4, 2014

## 2021-09-01 ENCOUNTER — VIRTUAL VISIT (OUTPATIENT)
Dept: INTERNAL MEDICINE CLINIC | Age: 51
End: 2021-09-01
Payer: COMMERCIAL

## 2021-09-01 DIAGNOSIS — M10.00 IDIOPATHIC GOUT, UNSPECIFIED CHRONICITY, UNSPECIFIED SITE: ICD-10-CM

## 2021-09-01 DIAGNOSIS — I73.9 PERIPHERAL VASCULAR DISEASE (HCC): ICD-10-CM

## 2021-09-01 DIAGNOSIS — F32.1 MODERATE MAJOR DEPRESSION (HCC): Primary | ICD-10-CM

## 2021-09-01 DIAGNOSIS — E11.65 TYPE 2 DIABETES MELLITUS WITH HYPERGLYCEMIA, WITHOUT LONG-TERM CURRENT USE OF INSULIN (HCC): ICD-10-CM

## 2021-09-01 DIAGNOSIS — I10 ESSENTIAL HYPERTENSION: ICD-10-CM

## 2021-09-01 PROCEDURE — 99213 OFFICE O/P EST LOW 20 MIN: CPT | Performed by: FAMILY MEDICINE

## 2021-09-01 NOTE — PROGRESS NOTES
Chief Complaint   Patient presents with    Depression     Patient is aware that this is a Virtual Visit or Phone Call Only doctor's visit. Patient has not been out of the country in (14 months), NO diarrhea, NO cough, NO chest conjestion, NO temp. Pt has not been around anyone with these symptoms. Health Maintenance reviewed. I have reviewed the patient's medical history in detail and updated the computerized patient record. 1. Have you been to the ER, urgent care clinic since your last visit? No Hospitalized since your last visit? No     2. Have you seen or consulted any other health care providers outside of the [address] [place] [address] since your last visit? no  Include any pap smears or colon screening. Encouraged pt to discuss pt's wishes with spouse/partner/family and bring them in the next appt to follow thru with the Advanced Directive      Fall Risk Assessment, last 12 mths 2/18/2021   Able to walk? Yes   Fall in past 12 months? 0   Do you feel unsteady?  0   Are you worried about falling 0       3 most recent PHQ Screens 9/1/2021   Little interest or pleasure in doing things More than half the days   Feeling down, depressed, irritable, or hopeless More than half the days   Total Score PHQ 2 4   Trouble falling or staying asleep, or sleeping too much -   Feeling tired or having little energy -   Poor appetite, weight loss, or overeating -   Feeling bad about yourself - or that you are a failure or have let yourself or your family down -   Trouble concentrating on things such as school, work, reading, or watching TV -   Moving or speaking so slowly that other people could have noticed; or the opposite being so fidgety that others notice -   Thoughts of being better off dead, or hurting yourself in some way -   PHQ 9 Score -   How difficult have these problems made it for you to do your work, take care of your home and get along with others -       Abuse Screening Questionnaire 9/1/2021 Do you ever feel afraid of your partner? N   Are you in a relationship with someone who physically or mentally threatens you? N   Is it safe for you to go home?  Y       ADL Assessment 9/1/2021   Feeding yourself No Help Needed   Getting from bed to chair No Help Needed   Getting dressed No Help Needed   Bathing or showering No Help Needed   Walk across the room (includes cane/walker) No Help Needed   Using the telphone -   Taking your medications No Help Needed   Preparing meals No Help Needed   Managing money (expenses/bills) No Help Needed   Moderately strenuous housework (laundry) No Help Needed   Shopping for personal items (toiletries/medicines) Help Needed   Shopping for groceries Help Needed   Driving Help Needed   Climbing a flight of stairs -   Getting to places beyond walking distances Help Needed

## 2021-09-01 NOTE — PROGRESS NOTES
Mushtaq Donaldson is a 53 y.o. male who was phone evaluated on 9/1/2021. Consent:  He and/or his healthcare decision maker is aware that this patient-initiated Telehealth encounter is a billable service, with coverage as determined by his insurance carrier. He is aware that he may receive a bill and has provided verbal consent to proceed: Yes    I was in the office while conducting this encounter. Assessment & Plan:   Diagnoses and all orders for this visit:    1. Moderate major depression ([facility]    2. Type 2 diabetes mellitus with hyperglycemia, without long-term current use of insulin (HCC)    3. Idiopathic gout, unspecified chronicity, unspecified site    4. Essential hypertension    5. Peripheral vascular disease ([facility]      OK will write letter. 712  Subjective:      ASks for a disability letter, due to gouty arthritis. Also has depression seeing psychiatrist who's also writing a letter. Relafen helping a little. Currently hands bothering him the most. He has a  and a . Current Outpatient Medications   Medication Sig Dispense Refill    colchicine (MITIGARE) 0.6 mg capsule Take 1 Capsule by mouth daily. 30 Capsule 5    pravastatin (PRAVACHOL) 40 mg tablet Take 1 tablet by mouth nightly 90 Tablet 3    nabumetone (RELAFEN) 750 mg tablet Take 1 Tablet by mouth two (2) times daily as needed for Pain. 60 Tablet 2    losartan (COZAAR) 100 mg tablet Take 1 Tablet by mouth daily. 90 Tablet 3    omeprazole (PRILOSEC) 40 mg capsule Take 1 Capsule by mouth daily. 90 Capsule 3    sildenafil citrate (VIAGRA) 100 mg tablet TAKE 1 TABLET BY MOUTH AS NEEDED FOR ERECTILE DYSFUNCTION 5 Tablet 5    allopurinoL (ZYLOPRIM) 300 mg tablet Take 1 tablet by mouth once daily 90 Tablet 1    triamcinolone acetonide (KENALOG) 0.1 % topical cream APPLY  CREAM EXTERNALLY TO AFFECTED AREA TWICE DAILY 45 g 1    DULoxetine (CYMBALTA) 20 mg capsule Take 20 mg by mouth daily.       QUEtiapine SR (SEROquel XR) 50 mg sr tablet Take 100 mg by mouth nightly.  magnesium 250 mg tab Take  by mouth.  aspirin 81 mg chewable tablet Take 81 mg by mouth daily. No Known Allergies    Social History     Socioeconomic History    Marital status:      Spouse name: Not on file    Number of children: Not on file    Years of education: Not on file    Highest education level: Not on file   Occupational History    Occupation: unemployed   Tobacco Use    Smoking status: Former Smoker     Packs/day: 0.75     Years: 32.00     Pack years: 24.00     Types: Cigarettes     Quit date: 2021     Years since quittin.1    Smokeless tobacco: Never Used   Vaping Use    Vaping Use: Never used   Substance and Sexual Activity    Alcohol use: Not Currently     Alcohol/week: 1.0 standard drinks     Types: 1 Cans of beer per week     Comment: occ    Drug use: Never    Sexual activity: Yes     Partners: Female   Other Topics Concern    Not on file   Social History Narrative    Not on file     Social Determinants of Health     Financial Resource Strain:     Difficulty of Paying Living Expenses:    Food Insecurity:     Worried About [address] in the Last Year:     [address] [employer] in the Last Year:    Transportation Needs:     Lack of Transportation (Medical):  Lack of Transportation (Non-Medical):    Physical Activity:     Days of Exercise per Week:     Minutes of Exercise per Session:    Stress:     Feeling of Stress :    Social Connections:     Frequency of Communication with Friends and Family:     Frequency of Social Gatherings with Friends and Family:     Attends Rastafarian Services:     Active Member of Clubs or Organizations:     Attends Club or Organization Meetings:     Marital Status:    Intimate Partner Violence:     Fear of Current or Ex-Partner:     Emotionally Abused:     Physically Abused:     Sexually Abused:             We discussed the expected course, resolution and complications of the diagnosis(es) in detail. Medication risks, benefits, costs, interactions, and alternatives were discussed as indicated. I advised him to contact the office if his condition worsens, changes or fails to improve as anticipated. He expressed understanding with the diagnosis(es) and plan. Pursuant to the emergency declaration under the [address] [place] [address] waiver authority and the [employer] Act, this Virtual  Visit was conducted, with patient's consent, to reduce the patient's risk of exposure to COVID-19 and provide continuity of care for an established patient. Services were provided through a video synchronous discussion virtually to substitute for in-person clinic visit.     Autumn Romero MD

## 2021-09-01 NOTE — LETTER
9/1/2021 8:18 AM    Mr. Daria Solis  [address]  [address] [place] [address]        TO WHOM IT MAY CONCERN:    Lisa VAZQUEZMerna Oden is a patient of mine at [facility]. Mr. Mayo Oden should be considered disabled due to his gout, to severe depression, diabetes and peripheral vascular disease. Please do not hesitate to contact this office if you have any questions.         Sincerely,      Sury Cheng MD

## 2021-10-20 ENCOUNTER — TELEPHONE (OUTPATIENT)
Dept: INTERNAL MEDICINE CLINIC | Age: 51
End: 2021-10-20

## 2021-10-20 NOTE — TELEPHONE ENCOUNTER
Patients wife says he got a scan done on his esophagus and would like to know results and treatment for him!     Please call patients wife to advise; [phone]

## 2021-11-04 ENCOUNTER — VIRTUAL VISIT (OUTPATIENT)
Dept: INTERNAL MEDICINE CLINIC | Age: 51
End: 2021-11-04
Payer: COMMERCIAL

## 2021-11-04 DIAGNOSIS — K22.4 ESOPHAGEAL SPASM: Primary | ICD-10-CM

## 2021-11-04 DIAGNOSIS — Z23 ENCOUNTER FOR IMMUNIZATION: ICD-10-CM

## 2021-11-04 DIAGNOSIS — F32.1 MODERATE MAJOR DEPRESSION (HCC): ICD-10-CM

## 2021-11-04 DIAGNOSIS — E11.65 TYPE 2 DIABETES MELLITUS WITH HYPERGLYCEMIA, WITHOUT LONG-TERM CURRENT USE OF INSULIN (HCC): ICD-10-CM

## 2021-11-04 DIAGNOSIS — I10 ESSENTIAL HYPERTENSION: ICD-10-CM

## 2021-11-04 PROCEDURE — 99214 OFFICE O/P EST MOD 30 MIN: CPT | Performed by: FAMILY MEDICINE

## 2021-11-04 RX ORDER — DILTIAZEM HYDROCHLORIDE 60 MG/1
60 TABLET, FILM COATED ORAL 4 TIMES DAILY
Qty: 120 TABLET | Refills: 2 | Status: SHIPPED | OUTPATIENT
Start: 2021-11-04 | End: 2021-12-30 | Stop reason: SINTOL

## 2021-11-04 NOTE — PROGRESS NOTES
Chief Complaint   Patient presents with    Constipation     went to ER few days ago for back pain from constipation - wants results of CT scan for esophagus (speaking with wife)      Fall Risk Assessment, last 12 mths 2/18/2021   Able to walk? Yes   Fall in past 12 months? 0   Do you feel unsteady? 0   Are you worried about falling 0       3 most recent PHQ Screens 11/4/2021   Little interest or pleasure in doing things Not at all   Feeling down, depressed, irritable, or hopeless Not at all   Total Score PHQ 2 0   Trouble falling or staying asleep, or sleeping too much -   Feeling tired or having little energy -   Poor appetite, weight loss, or overeating -   Feeling bad about yourself - or that you are a failure or have let yourself or your family down -   Trouble concentrating on things such as school, work, reading, or watching TV -   Moving or speaking so slowly that other people could have noticed; or the opposite being so fidgety that others notice -   Thoughts of being better off dead, or hurting yourself in some way -   PHQ 9 Score -   How difficult have these problems made it for you to do your work, take care of your home and get along with others -       Abuse Screening Questionnaire 9/1/2021   Do you ever feel afraid of your partner? N   Are you in a relationship with someone who physically or mentally threatens you? N   Is it safe for you to go home?  Y       ADL Assessment 9/1/2021   Feeding yourself No Help Needed   Getting from bed to chair No Help Needed   Getting dressed No Help Needed   Bathing or showering No Help Needed   Walk across the room (includes cane/walker) No Help Needed   Using the telphone -   Taking your medications No Help Needed   Preparing meals No Help Needed   Managing money (expenses/bills) No Help Needed   Moderately strenuous housework (laundry) No Help Needed   Shopping for personal items (toiletries/medicines) Help Needed   Shopping for groceries Help Needed   Driving Help Needed   Climbing a flight of stairs -   Getting to places beyond walking distances Help Needed     1. Have you been to the ER, urgent care clinic since your last visit? Hospitalized since your last visit? YES, [facility]    2. Have you seen or consulted any other health care providers outside of the [address] [place] [address] [place] since your last visit? Include any pap smears or colon screening.    YES, [facility]

## 2021-11-04 NOTE — PROGRESS NOTES
Byron Crystal is a 53 y.o. male who was phone evaluated on 11/4/2021. Consent:  He and/or his healthcare decision maker is aware that this patient-initiated Telehealth encounter is a billable service, with coverage as determined by his insurance carrier. He is aware that he may receive a bill and has provided verbal consent to proceed: Yes    I was in the office while conducting this encounter. Assessment & Plan:       ICD-10-CM ICD-9-CM    1. Esophageal spasm  K22.4 530.5 dilTIAZem IR (CARDIZEM) 60 mg tablet      AMB SUPPLY ORDER   2. Moderate major depression (HCC)  F32.1 296.22    3. Type 2 diabetes mellitus with hyperglycemia, without long-term current use of insulin (HCC)  E11.65 250.00 HEMOGLOBIN A1C WITH EAG     790.29    4. Encounter for immunization  Z23 V03.89    5. Essential hypertension  I83 161.9 METABOLIC PANEL, BASIC     Orders Placed This Encounter    AMB SUPPLY ORDER     Ambulatory Blood pressure cuff    METABOLIC PANEL, BASIC     Standing Status:   Future     Standing Expiration Date:   5/7/2022    HEMOGLOBIN A1C WITH EAG     Standing Status:   Future     Standing Expiration Date:   11/4/2022    dilTIAZem IR (CARDIZEM) 60 mg tablet     Sig: Take 1 Tablet by mouth four (4) times daily. Dispense:  120 Tablet     Refill:  2     Current Outpatient Medications   Medication Sig Dispense Refill    dilTIAZem IR (CARDIZEM) 60 mg tablet Take 1 Tablet by mouth four (4) times daily. 120 Tablet 2    colchicine (MITIGARE) 0.6 mg capsule Take 1 Capsule by mouth daily. 30 Capsule 5    pravastatin (PRAVACHOL) 40 mg tablet Take 1 tablet by mouth nightly 90 Tablet 3    nabumetone (RELAFEN) 750 mg tablet Take 1 Tablet by mouth two (2) times daily as needed for Pain. 60 Tablet 2    omeprazole (PRILOSEC) 40 mg capsule Take 1 Capsule by mouth daily.  90 Capsule 3    sildenafil citrate (VIAGRA) 100 mg tablet TAKE 1 TABLET BY MOUTH AS NEEDED FOR ERECTILE DYSFUNCTION 5 Tablet 5    allopurinoL (ZYLOPRIM) 300 mg tablet Take 1 tablet by mouth once daily 90 Tablet 1    triamcinolone acetonide (KENALOG) 0.1 % topical cream APPLY  CREAM EXTERNALLY TO AFFECTED AREA TWICE DAILY 45 g 1    DULoxetine (CYMBALTA) 20 mg capsule Take 20 mg by mouth daily.  QUEtiapine SR (SEROquel XR) 50 mg sr tablet Take 100 mg by mouth nightly.  magnesium 250 mg tab Take  by mouth.  aspirin 81 mg chewable tablet Take 81 mg by mouth daily. F/up 1 mo     We discussed the covid vaccine. Discussed availability and how we prioritize patients to get it. Resources discussed. We discussed how important it is to get the vaccine and the relatively low side affects from it. 712  Subjective: Wife there. Pt not there. ABdo pain, went to ER. Nausea dry heaves. CP/abdo pain. Says esophageal spasm when he went to the ER. Taking miralax had BM. Quick to anger but getting better per wife. Saw cards, Bipin Alejandra, says esophagaus spasms and I would Rx. Did barium study. Stomach now OK went to ER yesterday.     Patient Active Problem List   Diagnosis Code    Essential hypertension I10    Hypercholesteremia E78.00    Eczema L30.9    Gout M10.9    Moderate major depression ([facility] F32.1    Peripheral vascular disease (HCC) I05.7    Helicobacter pylori infection A04.8    Fatty liver K76.0    Hyperglycemia due to type 2 diabetes mellitus ([facility] E11.65     Social History     Socioeconomic History    Marital status:      Spouse name: Not on file    Number of children: Not on file    Years of education: Not on file    Highest education level: Not on file   Occupational History    Occupation: unemployed   Tobacco Use    Smoking status: Former Smoker     Packs/day: 0.75     Years: 32.00     Pack years: 24.00     Types: Cigarettes     Quit date: 2021     Years since quittin.3    Smokeless tobacco: Never Used   Vaping Use    Vaping Use: Never used   Substance and Sexual Activity    Alcohol use: Not Currently Alcohol/week: 1.0 standard drink     Types: 1 Cans of beer per week     Comment: occ    Drug use: Never    Sexual activity: Yes     Partners: Female   Other Topics Concern    Not on file   Social History Narrative    Not on file     Social Determinants of Health     Financial Resource Strain:     Difficulty of Paying Living Expenses: Not on file   Food Insecurity:     Worried About Running Out of Food in the Last Year: Not on file    [employer] of Food in the Last Year: Not on file   Transportation Needs:     Lack of Transportation (Medical): Not on file    Lack of Transportation (Non-Medical): Not on file   Physical Activity:     Days of Exercise per Week: Not on file    Minutes of Exercise per Session: Not on file   Stress:     Feeling of Stress : Not on file   Social Connections:     Frequency of Communication with Friends and Family: Not on file    Frequency of Social Gatherings with Friends and Family: Not on file    Attends Voodoo Services: Not on file    Active Member of [address] [place] [address] or Organizations: Not on file    Attends Club or Organization Meetings: Not on file    Marital Status: Not on file   Intimate Partner Violence:     Fear of Current or Ex-Partner: Not on file    Emotionally Abused: Not on file    Physically Abused: Not on file    Sexually Abused: Not on file   Housing Stability:     Unable to Pay for Housing in the Last Year: Not on file    Number of [place] in the Last Year: Not on file    Unstable Housing in the Last Year: Not on file       We discussed the expected course, resolution and complications of the diagnosis(es) in detail. Medication risks, benefits, costs, interactions, and alternatives were discussed as indicated. I advised him to contact the office if his condition worsens, changes or fails to improve as anticipated. He expressed understanding with the diagnosis(es) and plan.      Pursuant to the emergency declaration under the [address] Emergencies Act, 1135 waiver authority and the Coronavirus Preparedness and Response Supplemental Appropriations Act, this Virtual  Visit was conducted, with patient's consent, to reduce the patient's risk of exposure to COVID-19 and provide continuity of care for an established patient. Services were provided through a video synchronous discussion virtually to substitute for in-person clinic visit.     Domitila Rodrigez MD

## 2021-12-30 ENCOUNTER — VIRTUAL VISIT (OUTPATIENT)
Dept: INTERNAL MEDICINE CLINIC | Age: 51
End: 2021-12-30
Payer: COMMERCIAL

## 2021-12-30 DIAGNOSIS — I10 ESSENTIAL HYPERTENSION: Primary | ICD-10-CM

## 2021-12-30 DIAGNOSIS — F32.1 MODERATE MAJOR DEPRESSION (HCC): ICD-10-CM

## 2021-12-30 DIAGNOSIS — L30.9 ECZEMA, UNSPECIFIED TYPE: ICD-10-CM

## 2021-12-30 DIAGNOSIS — M19.90 ARTHRITIS: ICD-10-CM

## 2021-12-30 DIAGNOSIS — E11.65 TYPE 2 DIABETES MELLITUS WITH HYPERGLYCEMIA, WITHOUT LONG-TERM CURRENT USE OF INSULIN (HCC): ICD-10-CM

## 2021-12-30 DIAGNOSIS — M10.00 IDIOPATHIC GOUT, UNSPECIFIED CHRONICITY, UNSPECIFIED SITE: ICD-10-CM

## 2021-12-30 DIAGNOSIS — E78.00 HYPERCHOLESTEREMIA: ICD-10-CM

## 2021-12-30 PROCEDURE — 99214 OFFICE O/P EST MOD 30 MIN: CPT | Performed by: FAMILY MEDICINE

## 2021-12-30 RX ORDER — ALLOPURINOL 300 MG/1
300 TABLET ORAL DAILY
Qty: 90 TABLET | Refills: 3 | Status: SHIPPED | OUTPATIENT
Start: 2021-12-30 | End: 2022-05-31

## 2021-12-30 RX ORDER — NABUMETONE 750 MG/1
750 TABLET, FILM COATED ORAL
Qty: 180 TABLET | Refills: 3 | Status: SHIPPED | OUTPATIENT
Start: 2021-12-30 | End: 2022-02-23 | Stop reason: ALTCHOICE

## 2021-12-30 RX ORDER — COLCHICINE 0.6 MG/1
0.6 CAPSULE ORAL DAILY
Qty: 90 CAPSULE | Refills: 3 | Status: SHIPPED | OUTPATIENT
Start: 2021-12-30 | End: 2022-10-20 | Stop reason: SDUPTHER

## 2021-12-30 RX ORDER — HALOPERIDOL 2 MG/1
1 TABLET ORAL DAILY
COMMUNITY
Start: 2021-12-27

## 2021-12-30 RX ORDER — LOSARTAN POTASSIUM 100 MG/1
100 TABLET ORAL DAILY
Qty: 90 TABLET | Refills: 3 | Status: SHIPPED | OUTPATIENT
Start: 2021-12-30 | End: 2022-10-20 | Stop reason: SDUPTHER

## 2021-12-30 NOTE — PROGRESS NOTES
Sean Campbell is a 49 y.o. male who was phone evaluated on 12/30/2021. Consent:  He and/or his healthcare decision maker is aware that this patient-initiated Telehealth encounter is a billable service, with coverage as determined by his insurance carrier. He is aware that he may receive a bill and has provided verbal consent to proceed: Yes    I was in the office while conducting this encounter. Assessment & Plan:     We discussed the covid vaccine. Discussed availability and how we prioritize patients to get it. Resources discussed. We discussed how important it is to get the vaccine and the relatively low side affects from it. ICD-10-CM ICD-9-CM    1. Essential hypertension  I10 401.9    2. Type 2 diabetes mellitus with hyperglycemia, without long-term current use of insulin (HCC)  E11.65 250.00      790.29    3. Idiopathic gout, unspecified chronicity, unspecified site  M10.00 274.9 colchicine (MITIGARE) 0.6 mg capsule   4. Hypercholesteremia  E78.00 272.0    5. Moderate major depression (HCC)  F32.1 296.22    6. Eczema, unspecified type  L30.9 692.9    7. Arthritis  M19.90 716.90 nabumetone (RELAFEN) 750 mg tablet     Orders Placed This Encounter    haloperidoL (HALDOL) 2 mg tablet     Sig: Take 1 mg by mouth daily.  colchicine (MITIGARE) 0.6 mg capsule     Sig: Take 1 Capsule by mouth daily. Dispense:  90 Capsule     Refill:  3     As needed    nabumetone (RELAFEN) 750 mg tablet     Sig: Take 1 Tablet by mouth two (2) times daily as needed for Pain. Dispense:  180 Tablet     Refill:  3    allopurinoL (ZYLOPRIM) 300 mg tablet     Sig: Take 1 Tablet by mouth daily. Dispense:  90 Tablet     Refill:  3    losartan (COZAAR) 100 mg tablet     Sig: Take 1 Tablet by mouth daily. Dispense:  90 Tablet     Refill:  3     Discussed HA epistaxis. MRI neck    712  Subjective:      BP pills qid, stopped due to epistaxis and HA. Went to ER stopped diltiazem and taking losartan. C/o freq HA. MIn c/o esophageal spasms. Stopped all meds but restarted ex diltiazem  Wife there  HA dialy to every other days. No head trauma. Gradually dec frequency. Diltiazem worsens. Neck mri earlier this year. Labs not done no transportaion  No hypos  Lab Results   Component Value Date/Time    Hemoglobin A1c 7.2 (H) 08/04/2021 03:07 PM    Hemoglobin A1c 6.0 (H) 01/03/2020 12:07 PM    Glucose 140 (H) 08/04/2021 03:07 PM    LDL, calculated 186 (H) 08/04/2021 03:07 PM    Creatinine (POC) 1.0 09/15/2020 12:30 PM    Creatinine 1.03 08/04/2021 03:07 PM     Current Outpatient Medications   Medication Sig Dispense Refill    triamcinolone acetonide (KENALOG) 0.1 % topical cream APPLY  CREAM EXTERNALLY TO AFFECTED AREA TWICE DAILY 45 g 5    colchicine (MITIGARE) 0.6 mg capsule Take 1 Capsule by mouth daily. 30 Capsule 5    pravastatin (PRAVACHOL) 40 mg tablet Take 1 tablet by mouth nightly 90 Tablet 3    nabumetone (RELAFEN) 750 mg tablet Take 1 Tablet by mouth two (2) times daily as needed for Pain. 60 Tablet 2    omeprazole (PRILOSEC) 40 mg capsule Take 1 Capsule by mouth daily. 90 Capsule 3    sildenafil citrate (VIAGRA) 100 mg tablet TAKE 1 TABLET BY MOUTH AS NEEDED FOR ERECTILE DYSFUNCTION 5 Tablet 5    allopurinoL (ZYLOPRIM) 300 mg tablet Take 1 tablet by mouth once daily 90 Tablet 1    DULoxetine (CYMBALTA) 20 mg capsule Take 20 mg by mouth daily.  magnesium 250 mg tab Take  by mouth.  aspirin 81 mg chewable tablet Take 81 mg by mouth daily.  haloperidoL (HALDOL) 2 mg tablet Take 1 mg by mouth daily.        Social History     Socioeconomic History    Marital status:      Spouse name: Not on file    Number of children: Not on file    Years of education: Not on file    Highest education level: Not on file   Occupational History    Occupation: unemployed   Tobacco Use    Smoking status: Former Smoker     Packs/day: 0.75     Years: 32.00     Pack years: 24.00     Types: Cigarettes     Quit date: 2021     Years since quittin.4    Smokeless tobacco: Never Used   Vaping Use    Vaping Use: Never used   Substance and Sexual Activity    Alcohol use: Not Currently     Alcohol/week: 1.0 standard drink     Types: 1 Cans of beer per week     Comment: occ    Drug use: Never    Sexual activity: Yes     Partners: Female   Other Topics Concern    Not on file   Social History Narrative    Not on file     Social Determinants of Health     Financial Resource Strain:     Difficulty of Paying Living Expenses: Not on file   Food Insecurity:     Worried About Running Out of Food in the Last Year: Not on file    Jorden of Food in the Last Year: Not on file   Transportation Needs:     Lack of Transportation (Medical): Not on file    Lack of Transportation (Non-Medical): Not on file   Physical Activity:     Days of Exercise per Week: Not on file    Minutes of Exercise per Session: Not on file   Stress:     Feeling of Stress : Not on file   Social Connections:     Frequency of Communication with Friends and Family: Not on file    Frequency of Social Gatherings with Friends and Family: Not on file    Attends Buddhist Services: Not on file    Active Member of [address] [place] [address] [employer] or Organizations: Not on file    Attends Club or Organization Meetings: Not on file    Marital Status: Not on file   Intimate Partner Violence:     Fear of Current or Ex-Partner: Not on file    Emotionally Abused: Not on file    Physically Abused: Not on file    Sexually Abused: Not on file   Housing Stability:     Unable to Pay for Housing in the Last Year: Not on file    Number of [place] in the Last Year: Not on file    Unstable Housing in the Last Year: Not on file           We discussed the expected course, resolution and complications of the diagnosis(es) in detail. Medication risks, benefits, costs, interactions, and alternatives were discussed as indicated.   I advised him to contact the office if his condition worsens, changes or fails to improve as anticipated. He expressed understanding with the diagnosis(es) and plan. Pursuant to the emergency declaration under the [facility] waiver authority and the [employer] Act, this Virtual  Visit was conducted, with patient's consent, to reduce the patient's risk of exposure to COVID-19 and provide continuity of care for an established patient. Services were provided through a video synchronous discussion virtually to substitute for in-person clinic visit.     Yanira Pinto MD

## 2021-12-30 NOTE — PROGRESS NOTES
Chief Complaint   Patient presents with    Medication Evaluation     Patient is aware that this is a Virtual Visit or Phone Call Only doctor's visit. Patient has not been out of the country in (14 months), NO diarrhea, NO cough, NO chest conjestion, NO temp. Pt has not been around anyone with these symptoms. Health Maintenance reviewed. I have reviewed the patient's medical history in detail and updated the computerized patient record. 1. Have you been to the ER, urgent care clinic since your last visit? yes Hospitalized since your last visit? no     2. Have you seen or consulted any other health care providers outside of the [address] [place] [address] since your last visit? No  Include any pap smears or colon screening. Encouraged pt to discuss pt's wishes with spouse/partner/family and bring them in the next appt to follow thru with the Advanced Directive      Fall Risk Assessment, last 12 mths 2/18/2021   Able to walk? Yes   Fall in past 12 months? 0   Do you feel unsteady?  0   Are you worried about falling 0       3 most recent PHQ Screens 12/30/2021   Little interest or pleasure in doing things Several days   Feeling down, depressed, irritable, or hopeless Several days   Total Score PHQ 2 2   Trouble falling or staying asleep, or sleeping too much -   Feeling tired or having little energy -   Poor appetite, weight loss, or overeating -   Feeling bad about yourself - or that you are a failure or have let yourself or your family down -   Trouble concentrating on things such as school, work, reading, or watching TV -   Moving or speaking so slowly that other people could have noticed; or the opposite being so fidgety that others notice -   Thoughts of being better off dead, or hurting yourself in some way -   PHQ 9 Score -   How difficult have these problems made it for you to do your work, take care of your home and get along with others -       Abuse Screening Questionnaire 9/1/2021   Do you ever feel afraid of your partner? N   Are you in a relationship with someone who physically or mentally threatens you? N   Is it safe for you to go home?  Y       ADL Assessment 9/1/2021   Feeding yourself No Help Needed   Getting from bed to chair No Help Needed   Getting dressed No Help Needed   Bathing or showering No Help Needed   Walk across the room (includes cane/walker) No Help Needed   Using the telphone -   Taking your medications No Help Needed   Preparing meals No Help Needed   Managing money (expenses/bills) No Help Needed   Moderately strenuous housework (laundry) No Help Needed   Shopping for personal items (toiletries/medicines) Help Needed   Shopping for groceries Help Needed   Driving Help Needed   Climbing a flight of stairs -   Getting to places beyond walking distances Help Needed

## 2022-01-31 DIAGNOSIS — M10.041 IDIOPATHIC GOUT OF RIGHT HAND, UNSPECIFIED CHRONICITY: ICD-10-CM

## 2022-01-31 DIAGNOSIS — M19.90 ARTHRITIS: ICD-10-CM

## 2022-02-16 ENCOUNTER — TELEPHONE (OUTPATIENT)
Dept: INTERNAL MEDICINE CLINIC | Age: 52
End: 2022-02-16

## 2022-02-16 DIAGNOSIS — E78.00 HYPERCHOLESTEREMIA: ICD-10-CM

## 2022-02-16 DIAGNOSIS — E11.65 TYPE 2 DIABETES MELLITUS WITH HYPERGLYCEMIA, WITHOUT LONG-TERM CURRENT USE OF INSULIN (HCC): Primary | ICD-10-CM

## 2022-02-16 DIAGNOSIS — M10.00 IDIOPATHIC GOUT, UNSPECIFIED CHRONICITY, UNSPECIFIED SITE: ICD-10-CM

## 2022-02-16 NOTE — TELEPHONE ENCOUNTER
Patient has appt for next Tuesday feb 22, he would like to have lab orders written up before, so can discuss lab results at appt.  Thankyou    Please call patient when orders are ready [phone]

## 2022-02-23 ENCOUNTER — VIRTUAL VISIT (OUTPATIENT)
Dept: INTERNAL MEDICINE CLINIC | Age: 52
End: 2022-02-23
Payer: COMMERCIAL

## 2022-02-23 DIAGNOSIS — I10 ESSENTIAL HYPERTENSION: ICD-10-CM

## 2022-02-23 DIAGNOSIS — F45.41 STRESS HEADACHE: Primary | ICD-10-CM

## 2022-02-23 DIAGNOSIS — E11.65 TYPE 2 DIABETES MELLITUS WITH HYPERGLYCEMIA, WITHOUT LONG-TERM CURRENT USE OF INSULIN (HCC): ICD-10-CM

## 2022-02-23 DIAGNOSIS — E78.00 HYPERCHOLESTEREMIA: ICD-10-CM

## 2022-02-23 PROCEDURE — 99213 OFFICE O/P EST LOW 20 MIN: CPT | Performed by: FAMILY MEDICINE

## 2022-02-23 NOTE — PROGRESS NOTES
Chief Complaint   Patient presents with    Headache     ER FU - headache and dizziness     Patient is aware that this is a Virtual Visit or Phone Call Only doctor's visit. Patient has not been out of the country in (14 months), NO diarrhea, NO cough, NO chest conjestion, NO temp. Pt has not been around anyone with these symptoms. Health Maintenance reviewed. I have reviewed the patient's medical history in detail and updated the computerized patient record. 1. Have you been to the ER, urgent care clinic since your last visit? yes Hospitalized since your last visit?  no    2. Have you seen or consulted any other health care providers outside of the [address] [place] [address] since your last visit? No  Include any pap smears or colon screening. Encouraged pt to discuss pt's wishes with spouse/partner/family and bring them in the next appt to follow thru with the Advanced Directive      Fall Risk Assessment, last 12 mths 2/18/2021   Able to walk? Yes   Fall in past 12 months? 0   Do you feel unsteady?  0   Are you worried about falling 0       3 most recent PHQ Screens 2/23/2022   Little interest or pleasure in doing things Several days   Feeling down, depressed, irritable, or hopeless Several days   Total Score PHQ 2 2   Trouble falling or staying asleep, or sleeping too much -   Feeling tired or having little energy -   Poor appetite, weight loss, or overeating -   Feeling bad about yourself - or that you are a failure or have let yourself or your family down -   Trouble concentrating on things such as school, work, reading, or watching TV -   Moving or speaking so slowly that other people could have noticed; or the opposite being so fidgety that others notice -   Thoughts of being better off dead, or hurting yourself in some way -   PHQ 9 Score -   How difficult have these problems made it for you to do your work, take care of your home and get along with others -       Abuse Screening Questionnaire 9/1/2021   Do you ever feel afraid of your partner? N   Are you in a relationship with someone who physically or mentally threatens you? N   Is it safe for you to go home?  Y       ADL Assessment 9/1/2021   Feeding yourself No Help Needed   Getting from bed to chair No Help Needed   Getting dressed No Help Needed   Bathing or showering No Help Needed   Walk across the room (includes cane/walker) No Help Needed   Using the telphone -   Taking your medications No Help Needed   Preparing meals No Help Needed   Managing money (expenses/bills) No Help Needed   Moderately strenuous housework (laundry) No Help Needed   Shopping for personal items (toiletries/medicines) Help Needed   Shopping for groceries Help Needed   Driving Help Needed   Climbing a flight of stairs -   Getting to places beyond walking distances Help Needed

## 2022-02-23 NOTE — PROGRESS NOTES
Cristina Lopez is a 49 y.o. male who was phone evaluated on 2022. Consent:  He and/or his healthcare decision maker is aware that this patient-initiated Telehealth encounter is a billable service, with coverage as determined by his insurance carrier. He is aware that he may receive a bill and has provided verbal consent to proceed: Yes    I was in the office while conducting this encounter. Assessment & Plan:       ICD-10-CM ICD-9-CM    1. Stress headache  F45.41 307.81    2. Type 2 diabetes mellitus with hyperglycemia, without long-term current use of insulin (HCC)  E11.65 250.00 REFERRAL TO OPTOMETRY     790.29 MICROALBUMIN, UR, RAND W/ MICROALB/CREAT RATIO      METABOLIC PANEL, BASIC      HEMOGLOBIN A1C WITH EAG   3.  Hypercholesteremia  E78.00 272.0    4. Essential hypertension  I10 401.9      Orders Placed This Encounter    MICROALBUMIN, UR, RAND W/ MICROALB/CREAT RATIO     Standing Status:   Future     Standing Expiration Date:       METABOLIC PANEL, BASIC     Standing Status:   Future     Standing Expiration Date:   2022    HEMOGLOBIN A1C WITH EAG     Standing Status:   Future     Standing Expiration Date:   2023    REFERRAL TO OPTOMETRY     Referral Priority:   Routine     Referral Type:   Consultation     Referral Reason:   Specialty Services Required     Referred to Provider:   Fleet Scheuermann, OD       Social History     Socioeconomic History    Marital status:      Spouse name: Not on file    Number of children: Not on file    Years of education: Not on file    Highest education level: Not on file   Occupational History    Occupation: unemployed   Tobacco Use    Smoking status: Former Smoker     Packs/day: 0.75     Years: 32.00     Pack years: 24.00     Types: Cigarettes     Quit date: 2021     Years since quittin.6    Smokeless tobacco: Never Used   Vaping Use    Vaping Use: Never used   Substance and Sexual Activity    Alcohol use: Not Currently Alcohol/week: 1.0 standard drink     Types: 1 Cans of beer per week     Comment: occ    Drug use: Never    Sexual activity: Yes     Partners: Female   Other Topics Concern    Not on file   Social History Narrative    Not on file     Social Determinants of Health     Financial Resource Strain:     Difficulty of Paying Living Expenses: Not on file   Food Insecurity:     Worried About Running Out of Food in the Last Year: Not on file    Jorden of Food in the Last Year: Not on file   Transportation Needs:     Lack of Transportation (Medical): Not on file    Lack of Transportation (Non-Medical): Not on file   Physical Activity:     Days of Exercise per Week: Not on file    Minutes of Exercise per Session: Not on file   Stress:     Feeling of Stress : Not on file   Social Connections:     Frequency of Communication with Friends and Family: Not on file    Frequency of Social Gatherings with Friends and Family: Not on file    Attends Caodaism Services: Not on file    Active Member of [address] [place] [address] or Organizations: Not on file    Attends Club or Organization Meetings: Not on file    Marital Status: Not on file   Intimate Partner Violence:     Fear of Current or Ex-Partner: Not on file    Emotionally Abused: Not on file    Physically Abused: Not on file    Sexually Abused: Not on file   Housing Stability:     Unable to Pay for Housing in the Last Year: Not on file    Number of [place] in the Last Year: Not on file    Unstable Housing in the Last Year: Not on file         712  Subjective:      HA saw provider did CT scan was OK. Went to [facility]. HA has resolved. RE the ER work up. Doesn't get too much Albarado's  Min N/V, Rx steroids. Mood OK BS labs were OK    Reports taking blood pressure medications without side affects. No complaints of exertional chest pain, excessive shortness of breath or focal weakness. Minimal swelling in lower legs or dizziness with standing. No head trauma.     Current Outpatient Medications   Medication Sig Dispense Refill    haloperidoL (HALDOL) 2 mg tablet Take 1 mg by mouth daily.  colchicine (MITIGARE) 0.6 mg capsule Take 1 Capsule by mouth daily. 90 Capsule 3    nabumetone (RELAFEN) 750 mg tablet Take 1 Tablet by mouth two (2) times daily as needed for Pain. 180 Tablet 3    allopurinoL (ZYLOPRIM) 300 mg tablet Take 1 Tablet by mouth daily. 90 Tablet 3    losartan (COZAAR) 100 mg tablet Take 1 Tablet by mouth daily. 90 Tablet 3    triamcinolone acetonide (KENALOG) 0.1 % topical cream APPLY  CREAM EXTERNALLY TO AFFECTED AREA TWICE DAILY 45 g 5    pravastatin (PRAVACHOL) 40 mg tablet Take 1 tablet by mouth nightly 90 Tablet 3    omeprazole (PRILOSEC) 40 mg capsule Take 1 Capsule by mouth daily. 90 Capsule 3    sildenafil citrate (VIAGRA) 100 mg tablet TAKE 1 TABLET BY MOUTH AS NEEDED FOR ERECTILE DYSFUNCTION 5 Tablet 5    DULoxetine (CYMBALTA) 20 mg capsule Take 20 mg by mouth daily.  magnesium 250 mg tab Take  by mouth.  aspirin 81 mg chewable tablet Take 81 mg by mouth daily. No Known Allergies          We discussed the expected course, resolution and complications of the diagnosis(es) in detail. Medication risks, benefits, costs, interactions, and alternatives were discussed as indicated. I advised him to contact the office if his condition worsens, changes or fails to improve as anticipated. He expressed understanding with the diagnosis(es) and plan. Pursuant to the emergency declaration under the [facility], [facility] waiver authority and the [employer] and [employer] General Act, this Virtual  Visit was conducted, with patient's consent, to reduce the patient's risk of exposure to COVID-19 and provide continuity of care for an established patient. Services were provided through a video synchronous discussion virtually to substitute for in-person clinic visit.     Omar Pichardo Yana Rocha MD

## 2022-03-01 LAB
BUN SERPL-MCNC: 10 MG/DL (ref 6–24)
BUN/CREAT SERPL: 11 (ref 9–20)
CALCIUM SERPL-MCNC: 9.1 MG/DL (ref 8.7–10.2)
CHLORIDE SERPL-SCNC: 101 MMOL/L (ref 96–106)
CO2 SERPL-SCNC: 22 MMOL/L (ref 20–29)
CREAT SERPL-MCNC: 0.88 MG/DL (ref 0.76–1.27)
EGFR: 105 ML/MIN/1.73
EST. AVERAGE GLUCOSE BLD GHB EST-MCNC: 148 MG/DL
GLUCOSE SERPL-MCNC: 89 MG/DL (ref 65–99)
HBA1C MFR BLD: 6.8 % (ref 4.8–5.6)
INTERPRETATION: NORMAL
POTASSIUM SERPL-SCNC: 4.1 MMOL/L (ref 3.5–5.2)
SODIUM SERPL-SCNC: 140 MMOL/L (ref 134–144)

## 2022-03-18 PROBLEM — I73.9 PERIPHERAL VASCULAR DISEASE (HCC): Status: ACTIVE | Noted: 2020-01-03

## 2022-03-19 PROBLEM — M10.9 GOUT: Status: ACTIVE | Noted: 2019-10-01

## 2022-03-19 PROBLEM — F32.1 MODERATE MAJOR DEPRESSION (HCC): Status: ACTIVE | Noted: 2019-10-01

## 2022-03-19 PROBLEM — L30.9 ECZEMA: Status: ACTIVE | Noted: 2019-10-01

## 2022-03-19 PROBLEM — E11.65 HYPERGLYCEMIA DUE TO TYPE 2 DIABETES MELLITUS (HCC): Status: ACTIVE | Noted: 2021-08-25

## 2022-03-19 PROBLEM — A04.8 HELICOBACTER PYLORI INFECTION: Status: ACTIVE | Noted: 2020-04-15

## 2022-03-19 PROBLEM — E78.00 HYPERCHOLESTEREMIA: Status: ACTIVE | Noted: 2019-10-01

## 2022-03-19 PROBLEM — I10 ESSENTIAL HYPERTENSION: Status: ACTIVE | Noted: 2019-10-01

## 2022-03-20 PROBLEM — K76.0 FATTY LIVER: Status: ACTIVE | Noted: 2021-01-26

## 2022-05-09 RX ORDER — CYCLOBENZAPRINE HCL 10 MG
10 TABLET ORAL
Qty: 90 TABLET | Refills: 2 | Status: SHIPPED | OUTPATIENT
Start: 2022-05-09 | End: 2022-08-23 | Stop reason: ALTCHOICE

## 2022-06-28 ENCOUNTER — NURSE TRIAGE (OUTPATIENT)
Dept: OTHER | Facility: CLINIC | Age: 52
End: 2022-06-28

## 2022-06-28 NOTE — TELEPHONE ENCOUNTER
Received call from Gucci at [facility] with Red Flag Complaint. Current Symptoms: Pt reports intermittent numbness and tingling in his toes. He does have a h/o arthritis and gout. Onset: One week ago    Pain Severity: 8 out of 10 when he wakes up in the morning     Temperature: Denies fever    What has been tried: Nothing yet    Pregnant: NA    Recommended disposition: Pt would like to be seen this week. Care advice provided, patient verbalizes understanding; denies any other questions or concerns; instructed to call back for any new or worsening symptoms. Patient/Caller agrees with recommended disposition; writer provided warm transfer to Mateo Dong at [facility] for appointment scheduling    Attention Provider: Thank you for allowing me to participate in the care of your patient. The patient was connected to triage in response to information provided to the ECC. Please do not respond through this encounter as the response is not directed to a shared pool.     Reason for Disposition   Patient wants to be seen    Protocols used: NEUROLOGIC DEFICIT-ADULT-[place]

## 2022-07-08 ENCOUNTER — VIRTUAL VISIT (OUTPATIENT)
Dept: INTERNAL MEDICINE CLINIC | Age: 52
End: 2022-07-08
Payer: COMMERCIAL

## 2022-07-08 DIAGNOSIS — R25.2 CRAMPS OF LOWER EXTREMITY: ICD-10-CM

## 2022-07-08 DIAGNOSIS — I10 ESSENTIAL HYPERTENSION: ICD-10-CM

## 2022-07-08 DIAGNOSIS — M10.071 ACUTE IDIOPATHIC GOUT OF RIGHT FOOT: Primary | ICD-10-CM

## 2022-07-08 DIAGNOSIS — G62.9 NEUROPATHY: ICD-10-CM

## 2022-07-08 PROCEDURE — 99213 OFFICE O/P EST LOW 20 MIN: CPT | Performed by: FAMILY MEDICINE

## 2022-07-08 RX ORDER — PREDNISONE 20 MG/1
40 TABLET ORAL
Qty: 10 TABLET | Refills: 0 | Status: SHIPPED | OUTPATIENT
Start: 2022-07-08 | End: 2022-07-13

## 2022-07-08 NOTE — LETTER
7/8/2022 11:56 AM    Mr. Blanche Guerrero [address]  [place] [id number]          Dear  Sameer Deepthi:    Marina Spencer you will find your lab orders. Please do not hesitate to contact this office if you have any questions.             Sincerely,      Alan Kennedy MD

## 2022-07-08 NOTE — PROGRESS NOTES
Ross Becker is a 49 y.o. male who was phone evaluated on 7/8/2022. Consent:  He and/or his healthcare decision maker is aware that this patient-initiated Telehealth encounter is a billable service, with coverage as determined by his insurance carrier. He is aware that he may receive a bill and has provided verbal consent to proceed: Yes    I was in the office while conducting this encounter. Assessment & Plan:       ICD-10-CM ICD-9-CM    1. Acute idiopathic gout of right foot  M10.071 274.01    2. Neuropathy  G62.9 355.9    3. Cramps of lower extremity  R25.2 729.82    4. Essential hypertension  D06 153.7 METABOLIC PANEL, BASIC      MAGNESIUM     Orders Placed This Encounter    METABOLIC PANEL, BASIC     Standing Status:   Future     Standing Expiration Date:   1/8/2023    MAGNESIUM     Standing Status:   Future     Standing Expiration Date:   1/8/2023    predniSONE (DELTASONE) 20 mg tablet     Sig: Take 2 Tablets by mouth daily (with breakfast) for 5 days. Dispense:  10 Tablet     Refill:  0       Cramps are painful. There cause is unknown. Sometimes potassium supplements can help. Other things can be tried: quinine, which is found in tonic water can be helpful. Pickle juice and bananas can also be tried. Discussed cramping exercises and especially muscle stretching just as a cramp starts. Salts in your body can be checked. 20 min  Patient was instructed on the limits of making a diagnosis at this visit. Was instructed to call us or go to the emergency room if the symptoms increased or if new symptoms appeared. 712  Subjective: Foor pain x weeks, says it not like previous gout Sx. No trauma. N/T in both feet. No redness or swelling noted. Both feet affected.  Cramps in both lower ext as well    Patient Active Problem List   Diagnosis Code    Essential hypertension I10    Hypercholesteremia E78.00    Eczema L30.9    Gout M10.9    Moderate major depression ([address] F32.1    Peripheral vascular disease (HCC) I86.0    Helicobacter pylori infection A04.8    Fatty liver K76.0    Hyperglycemia due to type 2 diabetes mellitus ([facility] [address] E11.65     Social History     Socioeconomic History    Marital status:      Spouse name: Not on file    Number of children: Not on file    Years of education: Not on file    Highest education level: Not on file   Occupational History    Occupation: unemployed   Tobacco Use    Smoking status: Former Smoker     Packs/day: 0.75     Years: 32.00     Pack years: 24.00     Types: Cigarettes     Quit date: 2021     Years since quittin.0    Smokeless tobacco: Never Used   Vaping Use    Vaping Use: Never used   Substance and Sexual Activity    Alcohol use: Not Currently     Alcohol/week: 1.0 standard drink     Types: 1 Cans of beer per week     Comment: occ    Drug use: Never    Sexual activity: Yes     Partners: Female   Other Topics Concern    Not on file   Social History Narrative    Not on file     Social Determinants of Health     Financial Resource Strain:     Difficulty of Paying Living Expenses: Not on file   Food Insecurity:     Worried About Running Out of Food in the Last Year: Not on file    Jorden of Food in the Last Year: Not on file   Transportation Needs:     Lack of Transportation (Medical): Not on file    Lack of Transportation (Non-Medical):  Not on file   Physical Activity:     Days of Exercise per Week: Not on file    Minutes of Exercise per Session: Not on file   Stress:     Feeling of Stress : Not on file   Social Connections:     Frequency of Communication with Friends and Family: Not on file    Frequency of Social Gatherings with Friends and Family: Not on file    Attends [employer] Services: Not on file    Active Member of Clubs or Organizations: Not on file    Attends Club or Organization Meetings: Not on file    Marital Status: Not on file   Intimate Partner Violence:     Fear of Current or Ex-Partner: Not on file    Emotionally Abused: Not on file    Physically Abused: Not on file    Sexually Abused: Not on file   Housing Stability:     Unable to Pay for Housing in the Last Year: Not on file    Number of Places Lived in the Last Year: Not on file    Unstable Housing in the Last Year: Not on file               We discussed the expected course, resolution and complications of the diagnosis(es) in detail. Medication risks, benefits, costs, interactions, and alternatives were discussed as indicated. I advised him to contact the office if his condition worsens, changes or fails to improve as anticipated. He expressed understanding with the diagnosis(es) and plan. Pursuant to the emergency declaration under the [facility] waiver authority and the [employer] General Act, this Virtual  Visit was conducted, with patient's consent, to reduce the patient's risk of exposure to COVID-19 and provide continuity of care for an established patient. Services were provided through a video synchronous discussion virtually to substitute for in-person clinic visit.     Robinson Scott MD

## 2022-07-08 NOTE — PROGRESS NOTES
Chief Complaint   Patient presents with    Numbness     numbness and tingling to toes x1 week     Patient is aware that this is a Virtual Visit or Phone Call Only doctor's visit. Patient has not been out of the country in (14 months), NO diarrhea, NO cough, NO chest conjestion, NO temp. Pt has not been around anyone with these symptoms. Health Maintenance reviewed. I have reviewed the patient's medical history in detail and updated the computerized patient record. 1. Have you been to the ER, urgent care clinic since your last visit? Yes  Hospitalized since your last visit? No     2. Have you seen or consulted any other health care providers outside of the [address] [place] [address] since your last visit? No  Include any pap smears or colon screening. Encouraged pt to discuss pt's wishes with spouse/partner/family and bring them in the next appt to follow thru with the Advanced Directive      Fall Risk Assessment, last 12 mths 2/18/2021   Able to walk? Yes   Fall in past 12 months? 0   Do you feel unsteady?  0   Are you worried about falling 0       3 most recent PHQ Screens 2/23/2022   Little interest or pleasure in doing things Several days   Feeling down, depressed, irritable, or hopeless Several days   Total Score PHQ 2 2   Trouble falling or staying asleep, or sleeping too much -   Feeling tired or having little energy -   Poor appetite, weight loss, or overeating -   Feeling bad about yourself - or that you are a failure or have let yourself or your family down -   Trouble concentrating on things such as school, work, reading, or watching TV -   Moving or speaking so slowly that other people could have noticed; or the opposite being so fidgety that others notice -   Thoughts of being better off dead, or hurting yourself in some way -   PHQ 9 Score -   How difficult have these problems made it for you to do your work, take care of your home and get along with others -       Abuse Screening Questionnaire 9/1/2021   Do you ever feel afraid of your partner? N   Are you in a relationship with someone who physically or mentally threatens you? N   Is it safe for you to go home?  Y       ADL Assessment 9/1/2021   Feeding yourself No Help Needed   Getting from bed to chair No Help Needed   Getting dressed No Help Needed   Bathing or showering No Help Needed   Walk across the room (includes cane/walker) No Help Needed   Using the telphone -   Taking your medications No Help Needed   Preparing meals No Help Needed   Managing money (expenses/bills) No Help Needed   Moderately strenuous housework (laundry) No Help Needed   Shopping for personal items (toiletries/medicines) Help Needed   Shopping for groceries Help Needed   Driving Help Needed   Climbing a flight of stairs -   Getting to places beyond walking distances Help Needed

## 2022-07-15 LAB
BUN SERPL-MCNC: 13 MG/DL (ref 6–24)
BUN/CREAT SERPL: 12 (ref 9–20)
CALCIUM SERPL-MCNC: 9.9 MG/DL (ref 8.7–10.2)
CHLORIDE SERPL-SCNC: 99 MMOL/L (ref 96–106)
CO2 SERPL-SCNC: 23 MMOL/L (ref 20–29)
CREAT SERPL-MCNC: 1.12 MG/DL (ref 0.76–1.27)
EGFR: 80 ML/MIN/1.73
GLUCOSE SERPL-MCNC: 137 MG/DL (ref 65–99)
MAGNESIUM SERPL-MCNC: 1.9 MG/DL (ref 1.6–2.3)
POTASSIUM SERPL-SCNC: 4.3 MMOL/L (ref 3.5–5.2)
SODIUM SERPL-SCNC: 139 MMOL/L (ref 134–144)

## 2022-08-23 ENCOUNTER — OFFICE VISIT (OUTPATIENT)
Dept: INTERNAL MEDICINE CLINIC | Age: 52
End: 2022-08-23
Payer: COMMERCIAL

## 2022-08-23 VITALS
BODY MASS INDEX: 33.6 KG/M2 | HEIGHT: 71 IN | TEMPERATURE: 98.2 F | DIASTOLIC BLOOD PRESSURE: 87 MMHG | OXYGEN SATURATION: 96 % | HEART RATE: 89 BPM | WEIGHT: 240 LBS | RESPIRATION RATE: 16 BRPM | SYSTOLIC BLOOD PRESSURE: 137 MMHG

## 2022-08-23 DIAGNOSIS — E11.65 TYPE 2 DIABETES MELLITUS WITH HYPERGLYCEMIA, WITHOUT LONG-TERM CURRENT USE OF INSULIN (HCC): ICD-10-CM

## 2022-08-23 DIAGNOSIS — M79.672 PAIN IN BOTH FEET: Primary | ICD-10-CM

## 2022-08-23 DIAGNOSIS — R25.2 CRAMPS OF LOWER EXTREMITY: ICD-10-CM

## 2022-08-23 DIAGNOSIS — M10.071 ACUTE IDIOPATHIC GOUT OF RIGHT FOOT: ICD-10-CM

## 2022-08-23 DIAGNOSIS — M79.671 PAIN IN BOTH FEET: Primary | ICD-10-CM

## 2022-08-23 DIAGNOSIS — M1A.0790 IDIOPATHIC CHRONIC GOUT OF FOOT WITHOUT TOPHUS, UNSPECIFIED LATERALITY: ICD-10-CM

## 2022-08-23 DIAGNOSIS — E78.00 HYPERCHOLESTEREMIA: ICD-10-CM

## 2022-08-23 DIAGNOSIS — F32.1 MODERATE MAJOR DEPRESSION (HCC): ICD-10-CM

## 2022-08-23 DIAGNOSIS — I10 ESSENTIAL HYPERTENSION: ICD-10-CM

## 2022-08-23 DIAGNOSIS — G62.9 NEUROPATHY: ICD-10-CM

## 2022-08-23 DIAGNOSIS — I73.9 PERIPHERAL VASCULAR DISEASE (HCC): ICD-10-CM

## 2022-08-23 PROCEDURE — 99214 OFFICE O/P EST MOD 30 MIN: CPT | Performed by: FAMILY MEDICINE

## 2022-08-23 RX ORDER — NORTRIPTYLINE HYDROCHLORIDE 25 MG/1
50 CAPSULE ORAL
Qty: 60 CAPSULE | Refills: 2 | Status: SHIPPED | OUTPATIENT
Start: 2022-08-23

## 2022-08-23 NOTE — PROGRESS NOTES
Subjective:     Jim Valadez is a 49 y.o. male seen for follow-up of diabetes. He has had hypoglycemic attacks. .no  Blood sugar control has been ok    Lab Results   Component Value Date/Time    Hemoglobin A1c 6.8 (H) 02/28/2022 02:43 PM    Hemoglobin A1c 7.2 (H) 08/04/2021 03:07 PM    Hemoglobin A1c 6.0 (H) 01/03/2020 12:07 PM    Glucose 137 (H) 07/14/2022 11:29 AM    LDL, calculated 186 (H) 08/04/2021 03:07 PM    Creatinine (POC) 1.0 09/15/2020 12:30 PM    Creatinine 1.12 07/14/2022 11:29 AM       He has diabetes and hypertension. Jim Valadez has the additional concern of agree. > 1 mo, occa N/T, worse with walking miles in AM, some N/T toes miles, no trauma. Reports taking blood pressure medications without side affects. No complaints of exertional chest pain, excessive shortness of breath or focal weakness. Minimal swelling in lower legs or dizziness with standing. Diet and Lifestyle: nonsmoker. Patient Active Problem List    Diagnosis Date Noted    Hyperglycemia due to type 2 diabetes mellitus ([facility] 08/25/2021    Fatty liver 96/74/4309    Helicobacter pylori infection 04/15/2020    Peripheral vascular disease ([facility] 01/03/2020    Essential hypertension 10/01/2019    Hypercholesteremia 10/01/2019    Eczema 10/01/2019    Gout 10/01/2019    Moderate major depression ([facility] 10/01/2019     Current Outpatient Medications   Medication Sig Dispense Refill    allopurinoL (ZYLOPRIM) 300 mg tablet Take 1 tablet by mouth once daily 90 Tablet 1    triamcinolone acetonide (KENALOG) 0.1 % topical cream APPLY CREAM EXTERNALLY TO AFFECTED AREA TWICE A DAY (USE A THIN LAYER) 45 g 2    diclofenac EC (VOLTAREN) 50 mg EC tablet Take 1 tablet by mouth twice daily as needed 60 Tablet 2    haloperidoL (HALDOL) 2 mg tablet Take 1 mg by mouth daily. colchicine (MITIGARE) 0.6 mg capsule Take 1 Capsule by mouth daily. 90 Capsule 3    losartan (COZAAR) 100 mg tablet Take 1 Tablet by mouth daily.  90 Tablet 3    pravastatin (PRAVACHOL) 40 mg tablet Take 1 tablet by mouth nightly 90 Tablet 3    omeprazole (PRILOSEC) 40 mg capsule Take 1 Capsule by mouth daily. 90 Capsule 3    sildenafil citrate (VIAGRA) 100 mg tablet TAKE 1 TABLET BY MOUTH AS NEEDED FOR ERECTILE DYSFUNCTION 5 Tablet 5    DULoxetine (CYMBALTA) 20 mg capsule Take 20 mg by mouth daily. magnesium 250 mg tab Take  by mouth. aspirin 81 mg chewable tablet Take 81 mg by mouth daily. No Known Allergies  Past Medical History:   Diagnosis Date    Depression     Hypercholesterolemia     Hyperglycemia due to type 2 diabetes mellitus ([facility] 2021    Hypertension     Psychotic disorder ([facility]      Social History     Tobacco Use    Smoking status: Former     Packs/day: 0.75     Years: 32.00     Pack years: 24.00     Types: Cigarettes     Quit date: 2021     Years since quittin.1    Smokeless tobacco: Never   Substance Use Topics    Alcohol use: Not Currently     Alcohol/week: 1.0 standard drink     Types: 1 Cans of beer per week     Comment: occ             Review of Systems  Pertinent items are noted in HPI. Mood better, no SI    Objective:     Significant for the following: NAD  Visit Vitals  /87 (BP 1 Location: Left arm, BP Patient Position: Sitting, BP Cuff Size: Adult)   Pulse 89   Temp 98.2 °F (36.8 °C) (Temporal)   Resp 16   Ht 5' 11\" (1.803 m)   Wt 240 lb (108.9 kg)   SpO2 96%   BMI 33.47 kg/m²     WD WN male NAD  Heart RRR without murmers clicks or rubs  Lungs CTA  Abdo soft nontender  Ext no edema  Diabetic foot exam was performed. No obvious sores or red lesions. Sensation checked by monofilament exam which was normal.  Dorsalis pedis pulse waspresent. Lab review: labs reviewed, I note that glycosylated hemoglobin mildly abnormal but acceptable. Assessment/Plan:     Follow-up diabetes well controlled, stable.   Diabetic issues reviewed with him: all medications, side effects and compliance discussed carefully, foot care discussed and Podiatry visits discussed, glycohemoglobin and other lab monitoring discussed, long term diabetic complications discussed, and labs immediately prior to next visit. ICD-10-CM ICD-9-CM    1. Pain in both feet  M79.671 729.5     M79.672        2. Acute idiopathic gout of right foot  M10.071 274.01       3. Cramps of lower extremity  R25.2 729.82       4. Essential hypertension  I10 401.9       5. Type 2 diabetes mellitus with hyperglycemia, without long-term current use of insulin ([facility])  E11.65 250.00      790.29       6. Moderate major depression ([facility]  F32.1 296.22           Orders Placed This Encounter    XR FOOT LT MIN 3 V     Standing Status:   Future     Standing Expiration Date:   9/23/2023     Order Specific Question:   Reason for Exam     Answer:   pain    XR FOOT RT MIN 3 V     Standing Status:   Future     Standing Expiration Date:   9/23/2023     Order Specific Question:   Reason for Exam     Answer:   pain foot     Order Specific Question:   Which facility to perform procedure? Answer:   [facility]    LIPID PANEL     Standing Status:   Future     Standing Expiration Date:   6/64/1254    METABOLIC PANEL, COMPREHENSIVE     Standing Status:   Future     Standing Expiration Date:   2/23/2023    CBC W/O DIFF     Standing Status:   Future     Standing Expiration Date:   2/23/2023    HEMOGLOBIN A1C WITH EAG     Standing Status:   Future     Standing Expiration Date:   8/23/2023    URIC ACID     Standing Status:   Future     Standing Expiration Date:   2/23/2023    DUPLEX LOW EXT ARTERIES WITH JAVED     Scheduling Instructions:      [facility]    ANKLE BRACHIAL INDEX     Standing Status:   Future     Standing Expiration Date:   2/23/2023     Scheduling Instructions:      [facility]    nortriptyline (PAMELOR) 25 mg capsule     Sig: Take 2 Capsules by mouth nightly.  Start 1 at night increase as tolerated     Dispense:  60 Capsule     Refill:  2       Discussed possible side affects, precautions, and drug interactions and possible benefits of the medication(s). Chronic Conditions Addressed Today       1. Hyperglycemia due to type 2 diabetes mellitus ([facility]     Relevant Orders     METABOLIC PANEL, COMPREHENSIVE     HEMOGLOBIN A1C WITH EAG    2. Peripheral vascular disease ([facility]    3. Moderate major depression (HCC)     Relevant Medications     nortriptyline (PAMELOR) 25 mg capsule    4. Hypercholesteremia     Relevant Orders     LIPID PANEL    5. Gout     Relevant Orders     URIC ACID    6. Essential hypertension     Acute Diagnoses Addressed Today       Pain in both feet    -  Primary        Relevant Medications        nortriptyline (PAMELOR) 25 mg capsule        Other Relevant Orders        DUPLEX LOW EXT ARTERIES WITH JAVED        CBC W/O DIFF        XR FOOT LT MIN 3 V        XR FOOT RT MIN 3 V        ANKLE BRACHIAL INDEX    Cramps of lower extremity        Neuropathy            Relevant Medications        nortriptyline (PAMELOR) 25 mg capsule            Follow-up and Dispositions    Return in about 4 weeks (around 9/20/2022) for routine follow up.

## 2022-08-23 NOTE — PROGRESS NOTES
Chief Complaint   Patient presents with    Foot Pain     Pt feels like he is walking on feet that are raised and curved and nerves jumping on both outsides of lower legs      Patient has not been out of the country in (14 months), NO diarrhea, NO cough, NO chest conjestion, NO temp. Pt has not been around anyone with these symptoms. Health Maintenance reviewed. I have reviewed the patient's medical history in detail and updated the computerized patient record. 1. Have you been to the ER, urgent care clinic since your last visit? No  Hospitalized since your last visit?  no    2. Have you seen or consulted any other health care providers outside of the [address] [place] [address] since your last visit? No  Include any pap smears or colon screening. Encouraged pt to discuss pt's wishes with spouse/partner/family and bring them in the next appt to follow thru with the Advanced Directive    @  [address], last 12 mths 2/18/2021   Able to walk? Yes   Fall in past 12 months? 0   Do you feel unsteady?  0   Are you worried about falling 0       3 most recent PHQ Screens 2/23/2022   Little interest or pleasure in doing things Several days   Feeling down, depressed, irritable, or hopeless Several days   Total Score PHQ 2 2   Trouble falling or staying asleep, or sleeping too much -   Feeling tired or having little energy -   Poor appetite, weight loss, or overeating -   Feeling bad about yourself - or that you are a failure or have let yourself or your family down -   Trouble concentrating on things such as school, work, reading, or watching TV -   Moving or speaking so slowly that other people could have noticed; or the opposite being so fidgety that others notice -   Thoughts of being better off dead, or hurting yourself in some way -   PHQ 9 Score -   How difficult have these problems made it for you to do your work, take care of your home and get along with others -       Abuse Screening Questionnaire 9/1/2021   Do you ever feel afraid of your partner? N   Are you in a relationship with someone who physically or mentally threatens you? N   Is it safe for you to go home?  Y       ADL Assessment 9/1/2021   Feeding yourself No Help Needed   Getting from bed to chair No Help Needed   Getting dressed No Help Needed   Bathing or showering No Help Needed   Walk across the room (includes cane/walker) No Help Needed   Using the telphone -   Taking your medications No Help Needed   Preparing meals No Help Needed   Managing money (expenses/bills) No Help Needed   Moderately strenuous housework (laundry) No Help Needed   Shopping for personal items (toiletries/medicines) Help Needed   Shopping for groceries Help Needed   Driving Help Needed   Climbing a flight of stairs -   Getting to places beyond walking distances Help Needed

## 2022-08-29 LAB — HBA1C MFR BLD HPLC: 6.4 %

## 2022-09-29 DIAGNOSIS — K21.9 GASTROESOPHAGEAL REFLUX DISEASE, UNSPECIFIED WHETHER ESOPHAGITIS PRESENT: ICD-10-CM

## 2022-09-29 DIAGNOSIS — E78.00 HYPERCHOLESTEREMIA: ICD-10-CM

## 2022-09-29 RX ORDER — OMEPRAZOLE 40 MG/1
CAPSULE, DELAYED RELEASE ORAL
Qty: 90 CAPSULE | Refills: 0 | Status: SHIPPED | OUTPATIENT
Start: 2022-09-29

## 2022-09-29 RX ORDER — PRAVASTATIN SODIUM 40 MG/1
TABLET ORAL
Qty: 90 TABLET | Refills: 0 | Status: SHIPPED | OUTPATIENT
Start: 2022-09-29 | End: 2022-10-04 | Stop reason: ALTCHOICE

## 2022-10-04 ENCOUNTER — OFFICE VISIT (OUTPATIENT)
Dept: INTERNAL MEDICINE CLINIC | Age: 52
End: 2022-10-04
Payer: COMMERCIAL

## 2022-10-04 VITALS
BODY MASS INDEX: 34.16 KG/M2 | SYSTOLIC BLOOD PRESSURE: 134 MMHG | HEIGHT: 71 IN | OXYGEN SATURATION: 98 % | DIASTOLIC BLOOD PRESSURE: 84 MMHG | RESPIRATION RATE: 16 BRPM | HEART RATE: 100 BPM | WEIGHT: 244 LBS | TEMPERATURE: 98.4 F

## 2022-10-04 DIAGNOSIS — I10 ESSENTIAL HYPERTENSION: ICD-10-CM

## 2022-10-04 DIAGNOSIS — F32.1 MODERATE MAJOR DEPRESSION (HCC): ICD-10-CM

## 2022-10-04 DIAGNOSIS — E11.65 TYPE 2 DIABETES MELLITUS WITH HYPERGLYCEMIA, WITHOUT LONG-TERM CURRENT USE OF INSULIN (HCC): Primary | ICD-10-CM

## 2022-10-04 DIAGNOSIS — M10.9 GOUT OF LEFT FOOT, UNSPECIFIED CAUSE, UNSPECIFIED CHRONICITY: ICD-10-CM

## 2022-10-04 DIAGNOSIS — E78.00 HYPERCHOLESTEREMIA: ICD-10-CM

## 2022-10-04 PROCEDURE — 99214 OFFICE O/P EST MOD 30 MIN: CPT | Performed by: FAMILY MEDICINE

## 2022-10-04 RX ORDER — DICLOFENAC SODIUM 10 MG/G
GEL TOPICAL 4 TIMES DAILY
Qty: 100 G | Refills: 5 | Status: SHIPPED | OUTPATIENT
Start: 2022-10-04

## 2022-10-04 RX ORDER — PREDNISONE 20 MG/1
40 TABLET ORAL
Qty: 10 TABLET | Refills: 0 | Status: SHIPPED | OUTPATIENT
Start: 2022-10-04 | End: 2022-10-09

## 2022-10-04 RX ORDER — ROSUVASTATIN CALCIUM 40 MG/1
40 TABLET, COATED ORAL
Qty: 30 TABLET | Refills: 5 | Status: SHIPPED | OUTPATIENT
Start: 2022-10-04

## 2022-10-04 NOTE — PROGRESS NOTES
Subjective:     Kalin Friend is a 49 y.o. male seen for follow-up of diabetes. He has had hypoglycemic attacks. .no  Blood sugar control has been ok    Lab Results   Component Value Date/Time    Hemoglobin A1c 6.8 (H) 02/28/2022 02:43 PM    Hemoglobin A1c 7.2 (H) 08/04/2021 03:07 PM    Hemoglobin A1c 6.0 (H) 01/03/2020 12:07 PM    Glucose 137 (H) 07/14/2022 11:29 AM    LDL, calculated 186 (H) 08/04/2021 03:07 PM    Creatinine (POC) 1.0 09/15/2020 12:30 PM    Creatinine 1.12 07/14/2022 11:29 AM       He has diabetes and hypertension. Kalin Friend has the additional concern of gout agrevated lately. Reports taking blood pressure medications without side affects. No complaints of exertional chest pain, excessive shortness of breath or focal weakness. Minimal swelling in lower legs occa dizziness with standing. Diet and Lifestyle: nonsmoker. Patient Active Problem List    Diagnosis Date Noted    Hyperglycemia due to type 2 diabetes mellitus ([facility] 08/25/2021    Fatty liver 25/51/8958    Helicobacter pylori infection 04/15/2020    Peripheral vascular disease ([facility] 01/03/2020    Essential hypertension 10/01/2019    Hypercholesteremia 10/01/2019    Eczema 10/01/2019    Gout 10/01/2019    Moderate major depression ([facility] 10/01/2019     Current Outpatient Medications   Medication Sig Dispense Refill    omeprazole (PRILOSEC) 40 mg capsule Take 1 capsule by mouth once daily 90 Capsule 0    pravastatin (PRAVACHOL) 40 mg tablet Take 1 tablet by mouth nightly 90 Tablet 0    nortriptyline (PAMELOR) 25 mg capsule Take 2 Capsules by mouth nightly.  Start 1 at night increase as tolerated 60 Capsule 2    allopurinoL (ZYLOPRIM) 300 mg tablet Take 1 tablet by mouth once daily 90 Tablet 1    triamcinolone acetonide (KENALOG) 0.1 % topical cream APPLY CREAM EXTERNALLY TO AFFECTED AREA TWICE A DAY (USE A THIN LAYER) 45 g 2    diclofenac EC (VOLTAREN) 50 mg EC tablet Take 1 tablet by mouth twice daily as needed 60 Tablet 2 haloperidoL (HALDOL) 2 mg tablet Take 1 mg by mouth daily. colchicine (MITIGARE) 0.6 mg capsule Take 1 Capsule by mouth daily. 90 Capsule 3    losartan (COZAAR) 100 mg tablet Take 1 Tablet by mouth daily. 90 Tablet 3    sildenafil citrate (VIAGRA) 100 mg tablet TAKE 1 TABLET BY MOUTH AS NEEDED FOR ERECTILE DYSFUNCTION 5 Tablet 5    DULoxetine (CYMBALTA) 20 mg capsule Take 20 mg by mouth daily. magnesium 250 mg tab Take  by mouth. aspirin 81 mg chewable tablet Take 81 mg by mouth daily. No Known Allergies  Past Medical History:   Diagnosis Date    Depression     Hypercholesterolemia     Hyperglycemia due to type 2 diabetes mellitus ([facility] 2021    Hypertension     Psychotic disorder ([facility]      Social History     Tobacco Use    Smoking status: Former     Packs/day: 0.75     Years: 32.00     Pack years: 24.00     Types: Cigarettes     Quit date: 2021     Years since quittin.2    Smokeless tobacco: Never   Substance Use Topics    Alcohol use: Not Currently     Alcohol/week: 1.0 standard drink     Types: 1 Cans of beer per week     Comment: occ             Review of Systems  Pertinent items are noted in HPI. Mood better, no SI    Objective:     Significant for the following: NAD  Visit Vitals  /84 (BP 1 Location: Left arm, BP Patient Position: Sitting, BP Cuff Size: Adult)   Pulse 100   Temp 98.4 °F (36.9 °C) (Temporal)   Resp 16   Ht 5' 11\" (1.803 m)   Wt 244 lb (110.7 kg)   SpO2 98%   BMI 34.03 kg/m²     WD WN male NAD    Lab review: labs reviewed, I note that glycosylated hemoglobin mildly abnormal but acceptable. Assessment/Plan:     Follow-up diabetes well controlled, stable. Diabetic issues reviewed with him: all medications, side effects and compliance discussed carefully, foot care discussed and Podiatry visits discussed, glycohemoglobin and other lab monitoring discussed, long term diabetic complications discussed, and labs immediately prior to next visit. ICD-10-CM ICD-9-CM    1. Type 2 diabetes mellitus with hyperglycemia, without long-term current use of insulin ([facility])  E11.65 250.00 REFERRAL TO OPTOMETRY     790.29 MICROALBUMIN, UR, RAND W/ MICROALB/CREAT RATIO      2. Gout of left foot, unspecified cause, unspecified chronicity  M10.9 274.9 predniSONE (DELTASONE) 20 mg tablet      3. Hypercholesteremia  E78.00 272.0 rosuvastatin (CRESTOR) 40 mg tablet      4. Essential hypertension  I10 401.9       5. Moderate major depression ([facility]  F32.1 296.22             Orders Placed This Encounter    MICROALBUMIN, UR, RAND W/ MICROALB/CREAT RATIO     Standing Status:   Future     Standing Expiration Date:   10/4/2023    REFERRAL TO OPTOMETRY     Referral Priority:   Routine     Referral Type:   Consultation     Referral Reason:   Specialty Services Required     Referred to Provider:   Stacey Galicia, ROQUE    predniSONE (DELTASONE) 20 mg tablet     Sig: Take 2 Tablets by mouth daily (with breakfast) for 5 days. Dispense:  10 Tablet     Refill:  0    diclofenac (VOLTAREN) 1 % gel     Sig: Apply  to affected area four (4) times daily. Dispense:  100 g     Refill:  5    rosuvastatin (CRESTOR) 40 mg tablet     Sig: Take 1 Tablet by mouth nightly. Dispense:  30 Tablet     Refill:  5           Follow-up and Dispositions    Return in about 4 months (around 2/4/2023). Discussed possible side affects, precautions, and drug interactions and possible benefits of the medication(s).

## 2022-10-04 NOTE — PROGRESS NOTES
Chief Complaint   Patient presents with    Gout     L/Great Toe    Labs     FU     Patient has not been out of the country in (14 months), NO diarrhea, NO cough, NO chest conjestion, NO temp. Pt has not been around anyone with these symptoms. Health Maintenance reviewed. I have reviewed the patient's medical history in detail and updated the computerized patient record. 1. Have you been to the ER, urgent care clinic since your last visit? Hospitalized since your last visit?  no    2. Have you seen or consulted any other health care providers outside of the [address] [place] [address] since your last visit? Include any pap smears or colon screening. Encouraged pt to discuss pt's wishes with spouse/partner/family and bring them in the next appt to follow thru with the Advanced Directive    @  [address] [facility] [address], last 12 mths 2/18/2021   Able to walk? Yes   Fall in past 12 months? 0   Do you feel unsteady? 0   Are you worried about falling 0       3 most recent PHQ Screens 2/23/2022   Little interest or pleasure in doing things Several days   Feeling down, depressed, irritable, or hopeless Several days   Total Score PHQ 2 2   Trouble falling or staying asleep, or sleeping too much -   Feeling tired or having little energy -   Poor appetite, weight loss, or overeating -   Feeling bad about yourself - or that you are a failure or have let yourself or your family down -   Trouble concentrating on things such as school, work, reading, or watching TV -   Moving or speaking so slowly that other people could have noticed; or the opposite being so fidgety that others notice -   Thoughts of being better off dead, or hurting yourself in some way -   PHQ 9 Score -   How difficult have these problems made it for you to do your work, take care of your home and get along with others -       Abuse Screening Questionnaire 9/1/2021   Do you ever feel afraid of your partner?  N   Are you in a relationship with someone who physically or mentally threatens you? N   Is it safe for you to go home?  Y       ADL Assessment 9/1/2021   Feeding yourself No Help Needed   Getting from bed to chair No Help Needed   Getting dressed No Help Needed   Bathing or showering No Help Needed   Walk across the room (includes cane/walker) No Help Needed   Using the telphone -   Taking your medications No Help Needed   Preparing meals No Help Needed   Managing money (expenses/bills) No Help Needed   Moderately strenuous housework (laundry) No Help Needed   Shopping for personal items (toiletries/medicines) Help Needed   Shopping for groceries Help Needed   Driving Help Needed   Climbing a flight of stairs -   Getting to places beyond walking distances Help Needed

## 2022-10-05 LAB
CREAT UR-MCNC: 221 MG/DL
MICROALBUMIN UR-MCNC: 1.06 MG/DL
MICROALBUMIN/CREAT UR-RTO: 5 MG/G (ref 0–30)

## 2022-10-20 ENCOUNTER — VIRTUAL VISIT (OUTPATIENT)
Dept: INTERNAL MEDICINE CLINIC | Age: 52
End: 2022-10-20
Payer: COMMERCIAL

## 2022-10-20 DIAGNOSIS — M19.90 ARTHRITIS: ICD-10-CM

## 2022-10-20 DIAGNOSIS — I10 ESSENTIAL HYPERTENSION: ICD-10-CM

## 2022-10-20 DIAGNOSIS — M10.9 GOUT OF LEFT FOOT, UNSPECIFIED CAUSE, UNSPECIFIED CHRONICITY: ICD-10-CM

## 2022-10-20 DIAGNOSIS — E11.65 TYPE 2 DIABETES MELLITUS WITH HYPERGLYCEMIA, WITHOUT LONG-TERM CURRENT USE OF INSULIN (HCC): ICD-10-CM

## 2022-10-20 DIAGNOSIS — R07.9 CHEST PAIN, UNSPECIFIED TYPE: Primary | ICD-10-CM

## 2022-10-20 DIAGNOSIS — M10.00 IDIOPATHIC GOUT, UNSPECIFIED CHRONICITY, UNSPECIFIED SITE: ICD-10-CM

## 2022-10-20 DIAGNOSIS — F32.1 MODERATE MAJOR DEPRESSION (HCC): ICD-10-CM

## 2022-10-20 DIAGNOSIS — E78.00 HYPERCHOLESTEREMIA: ICD-10-CM

## 2022-10-20 PROCEDURE — 99443 PR PHYS/QHP TELEPHONE EVALUATION 21-30 MIN: CPT | Performed by: FAMILY MEDICINE

## 2022-10-20 RX ORDER — ALLOPURINOL 300 MG/1
300 TABLET ORAL DAILY
Qty: 90 TABLET | Refills: 3 | Status: SHIPPED | OUTPATIENT
Start: 2022-10-20

## 2022-10-20 RX ORDER — DIVALPROEX SODIUM 500 MG/1
500 TABLET, EXTENDED RELEASE ORAL
COMMUNITY
Start: 2022-10-06

## 2022-10-20 RX ORDER — LOSARTAN POTASSIUM 100 MG/1
100 TABLET ORAL DAILY
Qty: 90 TABLET | Refills: 3 | Status: SHIPPED | OUTPATIENT
Start: 2022-10-20

## 2022-10-20 RX ORDER — COLCHICINE 0.6 MG/1
0.6 CAPSULE ORAL DAILY
Qty: 90 CAPSULE | Refills: 1 | Status: SHIPPED | OUTPATIENT
Start: 2022-10-20

## 2022-10-20 NOTE — PROGRESS NOTES
Subjective:     Gerry Medina is a 49 y.o. male who presents for follow up of hypertension and hyperlipidemia. New concerns: wants stress test but min Sx  Psychiatry=Tori kelley added, says it is strong. Diet and Lifestyle: nonsmoker    Cardiovascular ROS:   Reports taking blood pressure medications without side affects. No complaints of exertional chest pain, excessive shortness of breath or focal weakness. Minimal swelling in lower legs or dizziness with standing. Review of Systems, additional:  Pertinent items are noted in HPI. Patient Active Problem List    Diagnosis Date Noted    Hyperglycemia due to type 2 diabetes mellitus ([facility] 08/25/2021    Fatty liver 52/26/4260    Helicobacter pylori infection 04/15/2020    Peripheral vascular disease ([facility] 01/03/2020    Essential hypertension 10/01/2019    Hypercholesteremia 10/01/2019    Eczema 10/01/2019    Gout 10/01/2019    Moderate major depression ([facility] 10/01/2019     Current Outpatient Medications   Medication Sig Dispense Refill    diclofenac (VOLTAREN) 1 % gel Apply  to affected area four (4) times daily. 100 g 5    rosuvastatin (CRESTOR) 40 mg tablet Take 1 Tablet by mouth nightly. 30 Tablet 5    omeprazole (PRILOSEC) 40 mg capsule Take 1 capsule by mouth once daily 90 Capsule 0    nortriptyline (PAMELOR) 25 mg capsule Take 2 Capsules by mouth nightly. Start 1 at night increase as tolerated 60 Capsule 2    allopurinoL (ZYLOPRIM) 300 mg tablet Take 1 tablet by mouth once daily 90 Tablet 1    triamcinolone acetonide (KENALOG) 0.1 % topical cream APPLY CREAM EXTERNALLY TO AFFECTED AREA TWICE A DAY (USE A THIN LAYER) 45 g 2    haloperidoL (HALDOL) 2 mg tablet Take 1 mg by mouth daily. colchicine (MITIGARE) 0.6 mg capsule Take 1 Capsule by mouth daily. 90 Capsule 3    losartan (COZAAR) 100 mg tablet Take 1 Tablet by mouth daily.  90 Tablet 3    sildenafil citrate (VIAGRA) 100 mg tablet TAKE 1 TABLET BY MOUTH AS NEEDED FOR ERECTILE DYSFUNCTION 5 Tablet 5    DULoxetine (CYMBALTA) 20 mg capsule Take 20 mg by mouth daily. magnesium 250 mg tab Take  by mouth. divalproex ER (DEPAKOTE ER) 500 mg ER tablet Take 500 mg by mouth nightly. No Known Allergies  Past Medical History:   Diagnosis Date    Depression     Hypercholesterolemia     Hyperglycemia due to type 2 diabetes mellitus ([facility] 2021    Hypertension     Psychotic disorder ([facility]      Social History     Tobacco Use    Smoking status: Former     Packs/day: 0.75     Years: 32.00     Pack years: 24.00     Types: Cigarettes     Quit date: 2021     Years since quittin.3    Smokeless tobacco: Never   Substance Use Topics    Alcohol use: Not Currently     Alcohol/week: 1.0 standard drink     Types: 1 Cans of beer per week     Comment: occ        Lab Results   Component Value Date/Time    Cholesterol, total 280 (H) 2021 03:07 PM    HDL Cholesterol 49 2021 03:07 PM    LDL, calculated 186 (H) 2021 03:07 PM    Triglyceride 237 (H) 2021 03:07 PM     Lab Results   Component Value Date/Time    GFR est non-AA 85 2021 03:07 PM    GFRNA, POC >60 09/15/2020 12:30 PM    GFR est AA 98 2021 03:07 PM    GFRAA, POC >60 09/15/2020 12:30 PM    Creatinine 1.12 2022 11:29 AM    Creatinine (POC) 1.0 09/15/2020 12:30 PM    BUN 13 2022 11:29 AM    Sodium 139 2022 11:29 AM    Potassium 4.3 2022 11:29 AM    Chloride 99 2022 11:29 AM    CO2 23 2022 11:29 AM    Magnesium 1.9 2022 11:29 AM     Lab Results   Component Value Date/Time    Glucose 137 (H) 2022 11:29 AM             Objective:     Physical exam significant for the following:     NAD  There were no vitals taken for this visit. .   Assessment/Plan:     hypertension well controlled, stable. ICD-10-CM ICD-9-CM    1. Chest pain, unspecified type  R07.9 786.50 NUCLEAR CARDIAC STRESS TEST      2. Arthritis  M19.90 716.90       3.  Idiopathic gout, unspecified chronicity, unspecified site  M10.00 274.9 colchicine (MITIGARE) 0.6 mg capsule      4. Type 2 diabetes mellitus with hyperglycemia, without long-term current use of insulin (HCC)  E11.65 250.00      790.29       5. Gout of left foot, unspecified cause, unspecified chronicity  M10.9 274.9 allopurinoL (ZYLOPRIM) 300 mg tablet      6. Essential hypertension  I10 401.9 losartan (COZAAR) 100 mg tablet      7. Hypercholesteremia  E78.00 272.0       8. Moderate major depression (HCC)  F32.1 296.22 divalproex ER (DEPAKOTE ER) 500 mg ER tablet          Orders Placed This Encounter    divalproex ER (DEPAKOTE ER) 500 mg ER tablet     Sig: Take 500 mg by mouth nightly. allopurinoL (ZYLOPRIM) 300 mg tablet     Sig: Take 1 Tablet by mouth daily. Dispense:  90 Tablet     Refill:  3    losartan (COZAAR) 100 mg tablet     Sig: Take 1 Tablet by mouth daily. Dispense:  90 Tablet     Refill:  3    colchicine (MITIGARE) 0.6 mg capsule     Sig: Take 1 Capsule by mouth daily. Dispense:  90 Capsule     Refill:  1     As needed       Current Outpatient Medications   Medication Sig Dispense Refill    allopurinoL (ZYLOPRIM) 300 mg tablet Take 1 Tablet by mouth daily. 90 Tablet 3    losartan (COZAAR) 100 mg tablet Take 1 Tablet by mouth daily. 90 Tablet 3    colchicine (MITIGARE) 0.6 mg capsule Take 1 Capsule by mouth daily. 90 Capsule 1    diclofenac (VOLTAREN) 1 % gel Apply  to affected area four (4) times daily. 100 g 5    rosuvastatin (CRESTOR) 40 mg tablet Take 1 Tablet by mouth nightly. 30 Tablet 5    omeprazole (PRILOSEC) 40 mg capsule Take 1 capsule by mouth once daily 90 Capsule 0    nortriptyline (PAMELOR) 25 mg capsule Take 2 Capsules by mouth nightly. Start 1 at night increase as tolerated 60 Capsule 2    triamcinolone acetonide (KENALOG) 0.1 % topical cream APPLY CREAM EXTERNALLY TO AFFECTED AREA TWICE A DAY (USE A THIN LAYER) 45 g 2    haloperidoL (HALDOL) 2 mg tablet Take 1 mg by mouth daily. sildenafil citrate (VIAGRA) 100 mg tablet TAKE 1 TABLET BY MOUTH AS NEEDED FOR ERECTILE DYSFUNCTION 5 Tablet 5    DULoxetine (CYMBALTA) 20 mg capsule Take 20 mg by mouth daily. magnesium 250 mg tab Take  by mouth. divalproex ER (DEPAKOTE ER) 500 mg ER tablet Take 500 mg by mouth nightly. Discussed screening testing he wants to do it, OK    F/up afterwards    Olive Fajardo is a 49 y.o. male who was phone evaluated on 10/20/2022. Consent:  He and/or his healthcare decision maker is aware that this patient-initiated Telehealth encounter is a billable service, with coverage as determined by his insurance carrier. He is aware that he may receive a bill and has provided verbal consent to proceed: Yes    I was at home while conducting this encounter. Assessment & Plan:   Diagnoses and all orders for this visit:    1. Chest pain, unspecified type  -     NUCLEAR CARDIAC STRESS TEST; Future    2. Arthritis    3. Idiopathic gout, unspecified chronicity, unspecified site  -     colchicine (MITIGARE) 0.6 mg capsule; Take 1 Capsule by mouth daily. 4. Type 2 diabetes mellitus with hyperglycemia, without long-term current use of insulin ([facility]    5. Gout of left foot, unspecified cause, unspecified chronicity  -     allopurinoL (ZYLOPRIM) 300 mg tablet; Take 1 Tablet by mouth daily. 6. Essential hypertension  -     losartan (COZAAR) 100 mg tablet; Take 1 Tablet by mouth daily. 7. Hypercholesteremia    8. Moderate major depression ([address]          Follow-up and Dispositions    Return if symptoms worsen or fail to improve. 712  Subjective:      24 min      We discussed the expected course, resolution and complications of the diagnosis(es) in detail. Medication risks, benefits, costs, interactions, and alternatives were discussed as indicated. I advised him to contact the office if his condition worsens, changes or fails to improve as anticipated.  He expressed understanding with the diagnosis(es) and plan. Pursuant to the emergency declaration under the [facility], [facility] waiver authority and the [employer] and [employer] Act, this Virtual  Visit was conducted, with patient's consent, to reduce the patient's risk of exposure to COVID-19 and provide continuity of care for an established patient. Services were provided through a video synchronous discussion virtually to substitute for in-person clinic visit.     Boo Cartagena MD

## 2022-10-20 NOTE — PROGRESS NOTES
Chief Complaint   Patient presents with    Medication Refill     Patient is aware that this is a Virtual Visit or Phone Call Only doctor's visit. Patient has not been out of the country in (14 months), NO diarrhea, NO cough, NO chest conjestion, NO temp. Pt has not been around anyone with these symptoms. Health Maintenance reviewed. I have reviewed the patient's medical history in detail and updated the computerized patient record. Have you been to the ER, urgent care clinic since your last visit? No Hospitalized since your last visit?  no    2. Have you seen or consulted any other health care providers outside of the [address] [place] [address] since your last visit?  no Include any pap smears or colon screening. Encouraged pt to discuss pt's wishes with spouse/partner/family and bring them in the next appt to follow thru with the Advanced Directive      Fall Risk Assessment, last 12 mths 2/18/2021   Able to walk? Yes   Fall in past 12 months? 0   Do you feel unsteady?  0   Are you worried about falling 0       3 most recent PHQ Screens 2/23/2022   Little interest or pleasure in doing things Several days   Feeling down, depressed, irritable, or hopeless Several days   Total Score PHQ 2 2   Trouble falling or staying asleep, or sleeping too much -   Feeling tired or having little energy -   Poor appetite, weight loss, or overeating -   Feeling bad about yourself - or that you are a failure or have let yourself or your family down -   Trouble concentrating on things such as school, work, reading, or watching TV -   Moving or speaking so slowly that other people could have noticed; or the opposite being so fidgety that others notice -   Thoughts of being better off dead, or hurting yourself in some way -   PHQ 9 Score -   How difficult have these problems made it for you to do your work, take care of your home and get along with others -       Abuse Screening Questionnaire 9/1/2021   Do you ever feel afraid of your partner? N   Are you in a relationship with someone who physically or mentally threatens you? N   Is it safe for you to go home?  Y       ADL Assessment 9/1/2021   Feeding yourself No Help Needed   Getting from bed to chair No Help Needed   Getting dressed No Help Needed   Bathing or showering No Help Needed   Walk across the room (includes cane/walker) No Help Needed   Using the telphone -   Taking your medications No Help Needed   Preparing meals No Help Needed   Managing money (expenses/bills) No Help Needed   Moderately strenuous housework (laundry) No Help Needed   Shopping for personal items (toiletries/medicines) Help Needed   Shopping for groceries Help Needed   Driving Help Needed   Climbing a flight of stairs -   Getting to places beyond walking distances Help Needed

## 2022-10-20 NOTE — LETTER
10/20/2022 2:49 PM    Mr. Gabriella Javier [address]  [facility] [address]      Dear Mr. Brandan Romeo:    Dunia Kunz        Sincerely,      Felicita Bravo MD

## 2022-11-03 ENCOUNTER — VIRTUAL VISIT (OUTPATIENT)
Dept: INTERNAL MEDICINE CLINIC | Age: 52
End: 2022-11-03
Payer: COMMERCIAL

## 2022-11-03 DIAGNOSIS — E11.65 TYPE 2 DIABETES MELLITUS WITH HYPERGLYCEMIA, WITHOUT LONG-TERM CURRENT USE OF INSULIN (HCC): ICD-10-CM

## 2022-11-03 DIAGNOSIS — E78.00 HYPERCHOLESTEREMIA: ICD-10-CM

## 2022-11-03 DIAGNOSIS — R07.9 CHEST PAIN, UNSPECIFIED TYPE: Primary | ICD-10-CM

## 2022-11-03 DIAGNOSIS — F32.1 MODERATE MAJOR DEPRESSION (HCC): ICD-10-CM

## 2022-11-03 DIAGNOSIS — R94.39 ABNORMAL CARDIOVASCULAR STRESS TEST: ICD-10-CM

## 2022-11-03 PROCEDURE — 99442 PR PHYS/QHP TELEPHONE EVALUATION 11-20 MIN: CPT | Performed by: FAMILY MEDICINE

## 2022-11-03 RX ORDER — ASPIRIN 81 MG/1
81 TABLET ORAL DAILY
Qty: 90 TABLET | Refills: 3 | Status: SHIPPED | OUTPATIENT
Start: 2022-11-03

## 2022-11-03 NOTE — PROGRESS NOTES
Subjective:     Billy Connelly is a 49 y.o. male who presents for follow up of hypertension. New concerns:   Stress test results reviewed with him, EKG was fine but there was minor defect on the scan, we discussed this. Diet and Lifestyle: nonsmoker    Cardiovascular ROS:   Reports taking blood pressure medications without side affects. No complaints of exertional chest pain, excessive shortness of breath or focal weakness. Minimal swelling in lower legs or dizziness with standing. Review of Systems, additional:  Pertinent items are noted in HPI. Patient Active Problem List    Diagnosis Date Noted    Abnormal cardiovascular stress test 2022    Hyperglycemia due to type 2 diabetes mellitus ([facility] 2021    Fatty liver     Helicobacter pylori infection 04/15/2020    Peripheral vascular disease ([facility] 2020    Essential hypertension 10/01/2019    Hypercholesteremia 10/01/2019    Eczema 10/01/2019    Gout 10/01/2019    Moderate major depression ([facility] 10/01/2019     No Known Allergies  Past Medical History:   Diagnosis Date    Depression     Hypercholesterolemia     Hyperglycemia due to type 2 diabetes mellitus ([facility] 2021    Hypertension     Psychotic disorder ([facility]      Social History     Tobacco Use    Smoking status: Former     Packs/day: 0.75     Years: 32.00     Pack years: 24.00     Types: Cigarettes     Quit date: 2021     Years since quittin.3    Smokeless tobacco: Never   Substance Use Topics    Alcohol use: Not Currently     Alcohol/week: 1.0 standard drink     Types: 1 Cans of beer per week     Comment: occ                 Objective:     Physical exam significant for the following:     Phone visit  There were no vitals taken for this visit. .   Assessment/Plan:     hypertension stable. ICD-10-CM ICD-9-CM    1. Chest pain, unspecified type  R07.9 786.50 REFERRAL TO CARDIOLOGY      2.  Abnormal cardiovascular stress test  R94.39 794.39 REFERRAL TO CARDIOLOGY      3. Hypercholesteremia  E78.00 272.0       4. Type 2 diabetes mellitus with hyperglycemia, without long-term current use of insulin (HCC)  E11.65 250.00      790.29       5. Moderate major depression ([facility]  F32.1 296.22           Orders Placed This Encounter    REFERRAL TO CARDIOLOGY     Referral Priority:   Routine     Referral Type:   Consultation     Referral Reason:   Specialty Services Required     Referred to Provider:   Varun Villanueva MD     Number of Visits Requested:   1    aspirin delayed-release 81 mg tablet     Sig: Take 1 Tablet by mouth daily. Dispense:  90 Tablet     Refill:  3       Follow-up and Dispositions    Return in about 3 months (around 2/3/2023). Shelba Cheadle is a 49 y.o. male who was phone evaluated on 11/3/2022. Consent:  He and/or his healthcare decision maker is aware that this patient-initiated Telehealth encounter is a billable service, with coverage as determined by his insurance carrier. He is aware that he may receive a bill and has provided verbal consent to proceed: Yes    I was at home while conducting this encounter. Assessment & Plan:   Diagnoses and all orders for this visit:    1. Chest pain, unspecified type  -     REFERRAL TO CARDIOLOGY    2. Abnormal cardiovascular stress test  -     REFERRAL TO CARDIOLOGY    3. Hypercholesteremia    4. Type 2 diabetes mellitus with hyperglycemia, without long-term current use of insulin (HCC)    5. Moderate major depression (HCC)    Other orders  -     aspirin delayed-release 81 mg tablet; Take 1 Tablet by mouth daily. Patient will go see cardiology      Follow-up and Dispositions    Return in about 3 months (around 2/3/2023). 712  Subjective:      11 minutes      We discussed the expected course, resolution and complications of the diagnosis(es) in detail. Medication risks, benefits, costs, interactions, and alternatives were discussed as indicated.   I advised him to contact the office if his condition worsens, changes or fails to improve as anticipated. He expressed understanding with the diagnosis(es) and plan. Pursuant to the emergency declaration under the [facility], [facility] waiver authority and the [employer] Act, this Virtual  Visit was conducted, with patient's consent, to reduce the patient's risk of exposure to COVID-19 and provide continuity of care for an established patient. Services were provided through a video synchronous discussion virtually to substitute for in-person clinic visit.     Didi Ramirez MD

## 2022-11-03 NOTE — PROGRESS NOTES
Chief Complaint   Patient presents with    Results     Patient is aware that this is a Virtual Visit or Phone Call Only doctor's visit. Patient has not been out of the country in (14 months), NO diarrhea, NO cough, NO chest conjestion, NO temp. Pt has not been around anyone with these symptoms. Health Maintenance reviewed. I have reviewed the patient's medical history in detail and updated the computerized patient record. Have you been to the ER, urgent care clinic since your last visit? No Hospitalized since your last visit? No     2. Have you seen or consulted any other health care providers outside of the [address] [place] [address] since your last visit? No Include any pap smears or colon screening. Encouraged pt to discuss pt's wishes with spouse/partner/family and bring them in the next appt to follow thru with the Advanced Directive      Fall Risk Assessment, last 12 mths 2/18/2021   Able to walk? Yes   Fall in past 12 months? 0   Do you feel unsteady?  0   Are you worried about falling 0       3 most recent PHQ Screens 2/23/2022   Little interest or pleasure in doing things Several days   Feeling down, depressed, irritable, or hopeless Several days   Total Score PHQ 2 2   Trouble falling or staying asleep, or sleeping too much -   Feeling tired or having little energy -   Poor appetite, weight loss, or overeating -   Feeling bad about yourself - or that you are a failure or have let yourself or your family down -   Trouble concentrating on things such as school, work, reading, or watching TV -   Moving or speaking so slowly that other people could have noticed; or the opposite being so fidgety that others notice -   Thoughts of being better off dead, or hurting yourself in some way -   PHQ 9 Score -   How difficult have these problems made it for you to do your work, take care of your home and get along with others -       Abuse Screening Questionnaire 9/1/2021   Do you ever feel afraid of your partner? N   Are you in a relationship with someone who physically or mentally threatens you? N   Is it safe for you to go home?  Y       ADL Assessment 9/1/2021   Feeding yourself No Help Needed   Getting from bed to chair No Help Needed   Getting dressed No Help Needed   Bathing or showering No Help Needed   Walk across the room (includes cane/walker) No Help Needed   Using the telphone -   Taking your medications No Help Needed   Preparing meals No Help Needed   Managing money (expenses/bills) No Help Needed   Moderately strenuous housework (laundry) No Help Needed   Shopping for personal items (toiletries/medicines) Help Needed   Shopping for groceries Help Needed   Driving Help Needed   Climbing a flight of stairs -   Getting to places beyond walking distances Help Needed

## 2022-12-06 RX ORDER — TRIAMCINOLONE ACETONIDE 1 MG/G
CREAM TOPICAL
Qty: 45 G | Refills: 0 | Status: SHIPPED | OUTPATIENT
Start: 2022-12-06

## 2022-12-07 ENCOUNTER — DOCUMENTATION ONLY (OUTPATIENT)
Dept: INTERNAL MEDICINE CLINIC | Age: 52
End: 2022-12-07

## 2022-12-07 NOTE — PROGRESS NOTES
KEY: [id number] Approved Drug: omeprazole  Strength: 40 mg  Quantity/Days: 30/30 [facility] pharmacy called approval Maurizio Boyd stated it went thru we will get it ready for him.   Duration: 1 year  12/07/2022 - 12/07/2023  Please inform your patient that the pharmacy may now fill the prescription

## 2022-12-07 NOTE — PROGRESS NOTES
Cleaning: [id number] - PA Case ID: [id number] - Rx #: [id number]  Status  Sent to Plan today. Please follow up as needed when outcome is received by fax.   Drug  Omeprazole 40MG dr capsules  Form  [facility] Electronic PA Form (1127 NCPDP)

## 2023-01-04 ENCOUNTER — OFFICE VISIT (OUTPATIENT)
Dept: INTERNAL MEDICINE CLINIC | Age: 53
End: 2023-01-04
Payer: COMMERCIAL

## 2023-01-04 VITALS
TEMPERATURE: 98.4 F | DIASTOLIC BLOOD PRESSURE: 72 MMHG | HEIGHT: 71 IN | BODY MASS INDEX: 32.9 KG/M2 | WEIGHT: 235 LBS | SYSTOLIC BLOOD PRESSURE: 118 MMHG | HEART RATE: 89 BPM | RESPIRATION RATE: 16 BRPM | OXYGEN SATURATION: 98 %

## 2023-01-04 DIAGNOSIS — M21.611 BUNION OF GREAT TOE OF RIGHT FOOT: ICD-10-CM

## 2023-01-04 DIAGNOSIS — M10.9 GOUT OF LEFT FOOT, UNSPECIFIED CAUSE, UNSPECIFIED CHRONICITY: ICD-10-CM

## 2023-01-04 DIAGNOSIS — E11.65 TYPE 2 DIABETES MELLITUS WITH HYPERGLYCEMIA, WITHOUT LONG-TERM CURRENT USE OF INSULIN (HCC): Primary | ICD-10-CM

## 2023-01-04 DIAGNOSIS — F32.1 MODERATE MAJOR DEPRESSION (HCC): ICD-10-CM

## 2023-01-04 DIAGNOSIS — I73.9 PERIPHERAL VASCULAR DISEASE (HCC): ICD-10-CM

## 2023-01-04 PROBLEM — F32.A DEPRESSION: Status: ACTIVE | Noted: 2023-01-04

## 2023-01-04 RX ORDER — NAPROXEN 500 MG/1
500 TABLET ORAL 2 TIMES DAILY WITH MEALS
Qty: 60 TABLET | Refills: 5 | Status: SHIPPED | OUTPATIENT
Start: 2023-01-04

## 2023-01-04 RX ORDER — PREDNISONE 20 MG/1
40 TABLET ORAL
Qty: 10 TABLET | Refills: 0 | Status: SHIPPED | OUTPATIENT
Start: 2023-01-04 | End: 2023-01-09

## 2023-01-04 NOTE — PROGRESS NOTES
Chief Complaint   Patient presents with    Foot Pain     L/top of foot pain x1 week      Patient has not been out of the country in (14 months), NO diarrhea, NO cough, NO chest conjestion, NO temp. Pt has not been around anyone with these symptoms. Health Maintenance reviewed. I have reviewed the patient's medical history in detail and updated the computerized patient record. 1. \"Have you been to the ER, urgent care clinic since your last visit? No  Hospitalized since your last visit? \" no    2. \"Have you seen or consulted any other health care providers outside of the [address] [place] [address] since your last visit? \" No      3. For patients aged 39-70: Has the patient had a colonoscopy / FIT/ Cologuard? Encouraged pt to discuss pt's wishes with spouse/partner/family and bring them in the next appt to follow thru with the Advanced Directive    @  [facility], last 12 mths 2/18/2021   Able to walk? Yes   Fall in past 12 months? 0   Do you feel unsteady?  0   Are you worried about falling 0       3 most recent PHQ Screens 1/4/2023   Little interest or pleasure in doing things Several days   Feeling down, depressed, irritable, or hopeless Several days   Total Score PHQ 2 2   Trouble falling or staying asleep, or sleeping too much -   Feeling tired or having little energy -   Poor appetite, weight loss, or overeating -   Feeling bad about yourself - or that you are a failure or have let yourself or your family down -   Trouble concentrating on things such as school, work, reading, or watching TV -   Moving or speaking so slowly that other people could have noticed; or the opposite being so fidgety that others notice -   Thoughts of being better off dead, or hurting yourself in some way -   PHQ 9 Score -   How difficult have these problems made it for you to do your work, take care of your home and get along with others -       Abuse Screening Questionnaire 9/1/2021   Do you ever feel afraid of your partner? N   Are you in a relationship with someone who physically or mentally threatens you? N   Is it safe for you to go home?  Y       ADL Assessment 9/1/2021   Feeding yourself No Help Needed   Getting from bed to chair No Help Needed   Getting dressed No Help Needed   Bathing or showering No Help Needed   Walk across the room (includes cane/walker) No Help Needed   Using the telphone -   Taking your medications No Help Needed   Preparing meals No Help Needed   Managing money (expenses/bills) No Help Needed   Moderately strenuous housework (laundry) No Help Needed   Shopping for personal items (toiletries/medicines) Help Needed   Shopping for groceries Help Needed   Driving Help Needed   Climbing a flight of stairs -   Getting to places beyond walking distances Help Needed

## 2023-01-04 NOTE — PROGRESS NOTES
Subjective:     Bigg Soto is a 47 y.o. male seen for follow-up of diabetes. He has had hypoglycemic attacks. .no  Blood sugar control has been ok    Lab Results   Component Value Date/Time    Hemoglobin A1c 6.8 (H) 2022 02:43 PM    Hemoglobin A1c 7.2 (H) 2021 03:07 PM    Hemoglobin A1c 6.0 (H) 2020 12:07 PM    Glucose 137 (H) 2022 11:29 AM    Microalbumin/Creat ratio (mg/g creat) 5 10/04/2022 10:00 AM    Microalbumin,urine random 1.06 10/04/2022 10:00 AM    LDL, calculated 186 (H) 2021 03:07 PM    Creatinine (POC) 1.0 09/15/2020 12:30 PM    Creatinine 1.12 2022 11:29 AM       He has diabetes, hypertension, and hyperlipidemia. Bigg Soto has the additional concern of left foot pain  Alsoo C/o his bunion hurting also left foot, shoes don't fit right    Reports taking blood pressure medications without side affects. No complaints of exertional chest pain, excessive shortness of breath or focal weakness. Minimal swelling in lower legs or dizziness with standing. Diet and Lifestyle: nonsmoker.     Patient Active Problem List    Diagnosis Date Noted    Depression 2023    Abnormal cardiovascular stress test 2022    Hyperglycemia due to type 2 diabetes mellitus ([address] 2021    Fatty liver     Helicobacter pylori infection 04/15/2020    Peripheral vascular disease ([address] 2020    Essential hypertension 10/01/2019    Hypercholesteremia 10/01/2019    Eczema 10/01/2019    Gout 10/01/2019    Moderate major depression ([address] 10/01/2019     No Known Allergies  Past Medical History:   Diagnosis Date    Depression     Hypercholesterolemia     Hyperglycemia due to type 2 diabetes mellitus ([address] 2021    Hypertension     Psychotic disorder ([address]      Social History     Tobacco Use    Smoking status: Former     Packs/day: 0.75     Years: 32.00     Pack years: 24.00     Types: Cigarettes     Quit date: 2021     Years since quittin.5    Smokeless tobacco: Never   Substance Use Topics    Alcohol use: Not Currently     Alcohol/week: 1.0 standard drink     Types: 1 Cans of beer per week     Comment: occ             Review of Systems  Pertinent items are noted in HPI. Previous naproxen prednisone worked. No trauma  Mood stable not suicidal    Objective:     Significant for the following: bunion right foot  Visit Vitals  /72 (BP 1 Location: Left arm, BP Patient Position: Sitting, BP Cuff Size: Adult)   Pulse 89   Temp 98.4 °F (36.9 °C) (Temporal)   Resp 16   Ht 5' 11\" (1.803 m)   Wt 235 lb (106.6 kg)   SpO2 98%   BMI 32.78 kg/m²     WD WN male NAD  Heart RRR without murmers clicks or rubs  Lungs CTA  Abdo soft nontender  Ext no edema  Dorsal swelling NV intact    Lab review: orders written for new lab studies as appropriate; see orders. Assessment/Plan:     Follow-up diabetes stable. Diabetic issues reviewed with him: all medications, side effects and compliance discussed carefully, foot care discussed and Podiatry visits discussed, glycohemoglobin and other lab monitoring discussed, and labs immediately prior to next visit. ICD-10-CM ICD-9-CM    1. Bunion of great toe of right foot  M21.611 727.1 REFERRAL TO PODIATRY      2. Peripheral vascular disease (HCC)  I73.9 443.9       3. Moderate major depression (HCC)  F32.1 296.22       4. Type 2 diabetes mellitus with hyperglycemia, without long-term current use of insulin ([facility])  X99.88 140.96 METABOLIC PANEL, BASIC     790.29 HEMOGLOBIN A1C WITH EAG      5.  Gout of left foot, unspecified cause, unspecified chronicity  M10.9 274.9 URIC ACID          Orders Placed This Encounter    METABOLIC PANEL, BASIC     Standing Status:   Future     Standing Expiration Date:   7/7/2023    URIC ACID     Standing Status:   Future     Standing Expiration Date:   7/4/2023    HEMOGLOBIN A1C WITH EAG     Standing Status:   Future     Standing Expiration Date:   1/4/2024    REFERRAL TO PODIATRY     Referral Priority: Routine     Referral Type:   Consultation     Referral Reason:   Specialty Services Required     Referred to Provider:   Audrey Alfaro DPM     Number of Visits Requested:   1    predniSONE (DELTASONE) 20 mg tablet     Sig: Take 2 Tablets by mouth daily (with breakfast) for 5 days. Dispense:  10 Tablet     Refill:  0    naproxen (NAPROSYN) 500 mg tablet     Sig: Take 1 Tablet by mouth two (2) times daily (with meals). Dispense:  60 Tablet     Refill:  5     Current Outpatient Medications   Medication Sig Dispense Refill    predniSONE (DELTASONE) 20 mg tablet Take 2 Tablets by mouth daily (with breakfast) for 5 days. 10 Tablet 0    naproxen (NAPROSYN) 500 mg tablet Take 1 Tablet by mouth two (2) times daily (with meals). 60 Tablet 5    triamcinolone acetonide (KENALOG) 0.1 % topical cream APPLY  CREAM EXTERNALLY TO AFFECTED AREA TWICE DAILY . USE  A  THIN  LAYER 45 g 0    aspirin delayed-release 81 mg tablet Take 1 Tablet by mouth daily. 90 Tablet 3    divalproex ER (DEPAKOTE ER) 500 mg ER tablet Take 500 mg by mouth nightly. allopurinoL (ZYLOPRIM) 300 mg tablet Take 1 Tablet by mouth daily. 90 Tablet 3    losartan (COZAAR) 100 mg tablet Take 1 Tablet by mouth daily. 90 Tablet 3    colchicine (MITIGARE) 0.6 mg capsule Take 1 Capsule by mouth daily. 90 Capsule 1    diclofenac (VOLTAREN) 1 % gel Apply  to affected area four (4) times daily. 100 g 5    rosuvastatin (CRESTOR) 40 mg tablet Take 1 Tablet by mouth nightly. 30 Tablet 5    omeprazole (PRILOSEC) 40 mg capsule Take 1 capsule by mouth once daily 90 Capsule 0    nortriptyline (PAMELOR) 25 mg capsule Take 2 Capsules by mouth nightly. Start 1 at night increase as tolerated 60 Capsule 2    haloperidoL (HALDOL) 2 mg tablet Take 1 mg by mouth daily. sildenafil citrate (VIAGRA) 100 mg tablet TAKE 1 TABLET BY MOUTH AS NEEDED FOR ERECTILE DYSFUNCTION 5 Tablet 5    DULoxetine (CYMBALTA) 20 mg capsule Take 20 mg by mouth daily.       magnesium 250 mg tab Take  by mouth. Discussed possible side affects, precautions, and drug interactions and possible benefits of the medication(s). Chronic Conditions Addressed Today       1. Hyperglycemia due to type 2 diabetes mellitus ([facility] - Primary     Relevant Orders     METABOLIC PANEL, BASIC     HEMOGLOBIN A1C WITH EAG    2. Peripheral vascular disease ([facility]    3. Moderate major depression ([facility]    4. Gout     Relevant Medications     predniSONE (DELTASONE) 20 mg tablet     naproxen (NAPROSYN) 500 mg tablet     Other Relevant Orders     URIC ACID     Acute Diagnoses Addressed Today       Bunion of great toe of right foot            Relevant Orders        REFERRAL TO PODIATRY          Follow-up and Dispositions    Return in about 6 weeks (around 2/15/2023).

## 2023-01-18 ENCOUNTER — OFFICE VISIT (OUTPATIENT)
Dept: INTERNAL MEDICINE CLINIC | Age: 53
End: 2023-01-18
Payer: COMMERCIAL

## 2023-01-18 VITALS
RESPIRATION RATE: 16 BRPM | TEMPERATURE: 98.6 F | SYSTOLIC BLOOD PRESSURE: 120 MMHG | WEIGHT: 235 LBS | OXYGEN SATURATION: 98 % | HEART RATE: 96 BPM | BODY MASS INDEX: 32.9 KG/M2 | HEIGHT: 71 IN | DIASTOLIC BLOOD PRESSURE: 85 MMHG

## 2023-01-18 DIAGNOSIS — Z87.891 PERSONAL HISTORY OF TOBACCO USE, PRESENTING HAZARDS TO HEALTH: ICD-10-CM

## 2023-01-18 DIAGNOSIS — F32.1 MODERATE MAJOR DEPRESSION (HCC): ICD-10-CM

## 2023-01-18 DIAGNOSIS — M19.90 ARTHRITIS: Primary | ICD-10-CM

## 2023-01-18 DIAGNOSIS — E11.65 TYPE 2 DIABETES MELLITUS WITH HYPERGLYCEMIA, WITHOUT LONG-TERM CURRENT USE OF INSULIN (HCC): ICD-10-CM

## 2023-01-18 DIAGNOSIS — M10.00 IDIOPATHIC GOUT, UNSPECIFIED CHRONICITY, UNSPECIFIED SITE: ICD-10-CM

## 2023-01-18 RX ORDER — PREDNISONE 20 MG/1
40 TABLET ORAL
Qty: 10 TABLET | Refills: 0 | Status: SHIPPED | OUTPATIENT
Start: 2023-01-18 | End: 2023-01-23

## 2023-01-18 NOTE — PROGRESS NOTES
Chief Complaint   Patient presents with    Foot Pain     L/R foot pain     Patient has not been out of the country in (14 months), NO diarrhea, NO cough, NO chest conjestion, NO temp. Pt has not been around anyone with these symptoms. Health Maintenance reviewed. I have reviewed the patient's medical history in detail and updated the computerized patient record. 1. \"Have you been to the ER, urgent care clinic since your last visit? No  Hospitalized since your last visit? \" no    2. \"Have you seen or consulted any other health care providers outside of the [address] [place] [address] since your last visit? \"  no    3. For patients aged 39-70: Has the patient had a colonoscopy / FIT/ Cologuard? No     Encouraged pt to discuss pt's wishes with spouse/partner/family and bring them in the next appt to follow thru with the Advanced Directive    @  Fall Risk Assessment, last 12 mths 2/18/2021   Able to walk? Yes   Fall in past 12 months? 0   Do you feel unsteady?  0   Are you worried about falling 0       3 most recent PHQ Screens 1/18/2023   Little interest or pleasure in doing things Several days   Feeling down, depressed, irritable, or hopeless Several days   Total Score PHQ 2 2   Trouble falling or staying asleep, or sleeping too much -   Feeling tired or having little energy -   Poor appetite, weight loss, or overeating -   Feeling bad about yourself - or that you are a failure or have let yourself or your family down -   Trouble concentrating on things such as school, work, reading, or watching TV -   Moving or speaking so slowly that other people could have noticed; or the opposite being so fidgety that others notice -   Thoughts of being better off dead, or hurting yourself in some way -   PHQ 9 Score -   How difficult have these problems made it for you to do your work, take care of your home and get along with others -       Abuse Screening Questionnaire 9/1/2021   Do you ever feel afraid of your partner? N   Are you in a relationship with someone who physically or mentally threatens you? N   Is it safe for you to go home?  Y       ADL Assessment 9/1/2021   Feeding yourself No Help Needed   Getting from bed to chair No Help Needed   Getting dressed No Help Needed   Bathing or showering No Help Needed   Walk across the room (includes cane/walker) No Help Needed   Using the telphone -   Taking your medications No Help Needed   Preparing meals No Help Needed   Managing money (expenses/bills) No Help Needed   Moderately strenuous housework (laundry) No Help Needed   Shopping for personal items (toiletries/medicines) Help Needed   Shopping for groceries Help Needed   Driving Help Needed   Climbing a flight of stairs -   Getting to places beyond walking distances Help Needed

## 2023-01-18 NOTE — PROGRESS NOTES
Mainly left foot painPROGRESS NOTE        SUBJECTIVE:  Diagnosis/Chief Complaint: Foot Pain (L/R foot pain)    He has used dronabinol from a friend with good relief. Doing well with pain no  Improvement in function: yes - naprosyn hekps a little as does prednisone  Pain levels /10   Usage of benzodiazapine or other sedatives no  Symptoms manily left foot pain both knees  Activities: Able to do activities of daily living.  yes - if dec pain  Side affects: no  States taking medications per medicine list.yes - as rx   queried no  Urine drug screen done no  Opiod Rx exceeds 50 MME/Ana  Hx of depression yes - mood so so  Hx of drug addiction: no  Safe storage of the opiods: NA  Narcotic contract reviewed and discussed: no    Patient Active Problem List    Diagnosis Date Noted    Depression 2023    Abnormal cardiovascular stress test 2022    Hyperglycemia due to type 2 diabetes mellitus ([facility] 2021    Fatty liver     Helicobacter pylori infection 04/15/2020    Peripheral vascular disease ([facility] 2020    Essential hypertension 10/01/2019    Hypercholesteremia 10/01/2019    Eczema 10/01/2019    Gout 10/01/2019    Moderate major depression ([facility] 10/01/2019     No Known Allergies  Past Medical History:   Diagnosis Date    Depression     Hypercholesterolemia     Hyperglycemia due to type 2 diabetes mellitus ([facility] 2021    Hypertension     Psychotic disorder ([facility]      Social History     Tobacco Use    Smoking status: Former     Packs/day: 0.75     Years: 32.00     Pack years: 24.00     Types: Cigarettes     Quit date: 2021     Years since quittin.5    Smokeless tobacco: Never   Substance Use Topics    Alcohol use: Not Currently     Alcohol/week: 1.0 standard drink     Types: 1 Cans of beer per week     Comment: occ        OBJECTIVE:    Visit Vitals  /85 (BP 1 Location: Left arm, BP Patient Position: Sitting, BP Cuff Size: Adult)   Pulse 96   Temp 98.6 °F (37 °C) (Temporal) Resp 16   Ht 5' 11\" (1.803 m)   Wt 235 lb (106.6 kg)   SpO2 98%   BMI 32.78 kg/m²     NAD    Reviewed: Medications, allergies, clinical lab test results and imaging results have been reviewed. Any abnormal findings have been addressed. ASSESSMENT:       ICD-10-CM ICD-9-CM    1. Arthritis  M19.90 716.90 REFERRAL TO BEHAVIORAL HEALTH      predniSONE (DELTASONE) 20 mg tablet      2. Personal history of tobacco use, presenting hazards to health  Z87.891 V15.82 COUNSELING VISIT TO DISCUSS NEED FOR LUNG CANCER SCREENING      CT LOW DOSE LUNG CANCER SCREENING      3. Idiopathic gout, unspecified chronicity, unspecified site  M10.00 274.9 predniSONE (DELTASONE) 20 mg tablet      4. Moderate major depression (HCC)  F32.1 296.22       5. Type 2 diabetes mellitus with hyperglycemia, without long-term current use of insulin (HCC)  E11.65 250.00      790.29             Patient is 47 y.o. with diagnosis of :   Patient Active Problem List   Diagnosis Code    Essential hypertension I10    Hypercholesteremia E78.00    Eczema L30.9    Gout M10.9    Moderate major depression ([address] F32.1    Peripheral vascular disease ([facility] H75.1    Helicobacter pylori infection A04.8    Fatty liver K76.0    Hyperglycemia due to type 2 diabetes mellitus ([facility] E11.65    Abnormal cardiovascular stress test R94.39    Depression F32. A       PLAN:     Orders Placed This Encounter    Low Dose CT for Lung Cancer Screening     Standing Status:   Future     Standing Expiration Date:   1/18/2024     Order Specific Question:   Is there documentation of shared decision making? Answer:   Yes     Order Specific Question:   Does the patient show any signs or symptoms of lung cancer? Answer:   No     Order Specific Question:   Is this the first (baseline) CT or an annual exam?     Answer:   Baseline [1]     Order Specific Question:   Is this a low dose CT or a routine CT?      Answer:   Low Dose CT [1]     Order Specific Question:   Smoking Status Answer: Former [4]     Order Specific Question:   Number of years since quit? Answer:   1     Order Specific Question:   Smoking packs per day? Answer:   0.75     Order Specific Question:   Years smoking? Answer:   28     Order Specific Question:   The patient was informed of the importance of adherence to annual screening, impact of comorbidities and ability/willingness to undergo diagnosis and treatment     Answer:   Yes     Order Specific Question:   The patient was informed of the importance of smoking cessation and/or maintaining smoking abstinence, including the offer of Medicare-covered tobacco cessation counseling services, if applicable     Answer:   Yes    REFERRAL TO BEHAVIORAL HEALTH     Referral Priority:   Routine     Referral Type:   Consultation     Referral Reason:   Specialty Services Required     Referral Location:   [facility] INTERNAL MEDICINE     Referred to Provider:   Veto Coombs MD     Number of Visits Requested:   1    COUNSELING VISIT TO DISCUSS NEED FOR LUNG CANCER SCREENING    predniSONE (DELTASONE) 20 mg tablet     Sig: Take 2 Tablets by mouth daily (with breakfast) for 5 days. Dispense:  10 Tablet     Refill:  0     Current Outpatient Medications   Medication Sig Dispense Refill    predniSONE (DELTASONE) 20 mg tablet Take 2 Tablets by mouth daily (with breakfast) for 5 days. 10 Tablet 0    triamcinolone acetonide (KENALOG) 0.1 % topical cream APPLY CREAM EXTERNALLY TO AFFECTED AREA TWICE DAILY. USE A THIN LAYER 45 g 1    omeprazole (PRILOSEC) 40 mg capsule Take 1 capsule by mouth once daily 90 Capsule 1    colchicine (MITIGARE) 0.6 mg capsule TAKE 1 CAPSULE BY MOUTH ONCE DAILY AS NEEDED 30 Capsule 5    naproxen (NAPROSYN) 500 mg tablet Take 1 Tablet by mouth two (2) times daily (with meals). 60 Tablet 5    aspirin delayed-release 81 mg tablet Take 1 Tablet by mouth daily.  90 Tablet 3    divalproex ER (DEPAKOTE ER) 500 mg ER tablet Take 500 mg by mouth nightly. allopurinoL (ZYLOPRIM) 300 mg tablet Take 1 Tablet by mouth daily. 90 Tablet 3    losartan (COZAAR) 100 mg tablet Take 1 Tablet by mouth daily. 90 Tablet 3    diclofenac (VOLTAREN) 1 % gel Apply  to affected area four (4) times daily. 100 g 5    rosuvastatin (CRESTOR) 40 mg tablet Take 1 Tablet by mouth nightly. 30 Tablet 5    nortriptyline (PAMELOR) 25 mg capsule Take 2 Capsules by mouth nightly. Start 1 at night increase as tolerated 60 Capsule 2    haloperidoL (HALDOL) 2 mg tablet Take 1 mg by mouth daily. sildenafil citrate (VIAGRA) 100 mg tablet TAKE 1 TABLET BY MOUTH AS NEEDED FOR ERECTILE DYSFUNCTION 5 Tablet 5    DULoxetine (CYMBALTA) 20 mg capsule Take 20 mg by mouth daily. magnesium 250 mg tab Take  by mouth. Discussed with the patient the current USPSTF guidelines released March 9, 2021 for screening for lung cancer. For adults aged 48 to [de-identified] years who have a 20 pack-year smoking history and currently smoke or have quit within the past 15 years the grade B recommendation is to:  Screen for lung cancer with low-dose computed tomography (LDCT) every year. Stop screening once a person has not smoked for 15 years or has a health problem that limits life expectancy or the ability to have lung surgery. The patient  reports that he quit smoking about 18 months ago. His smoking use included cigarettes. He has a 24.00 pack-year smoking history. He has never used smokeless tobacco..  Discussed with patient the risks and benefits of screening, including over-diagnosis, false positive rate, and total radiation exposure. The patient currently exhibits no signs or symptoms suggestive of lung cancer. Discussed with patient the importance of compliance with yearly annual lung cancer screenings and willingness to undergo diagnosis and treatment if screening scan is positive.   In addition, the patient was counseled regarding the importance of remaining smoke free and/or total smoking cessation. Also reviewed the following if the patient has Medicare that as of February 10, 2022, Medicare only covers LDCT screening in patients aged 51-72 with at least a 20 pack-year smoking history who currently smoke or have quit in the last 15 years        He will need somebody who can prescribe cannabinoids, can try  who I believe prescribes this.   Referral given

## 2023-01-18 NOTE — PATIENT INSTRUCTIONS

## 2023-02-08 DIAGNOSIS — M79.672 PAIN IN BOTH FEET: ICD-10-CM

## 2023-02-08 DIAGNOSIS — M79.671 PAIN IN BOTH FEET: ICD-10-CM

## 2023-02-09 LAB
BUN SERPL-MCNC: 10 MG/DL (ref 6–24)
BUN/CREAT SERPL: 10 (ref 9–20)
CALCIUM SERPL-MCNC: 9.2 MG/DL (ref 8.7–10.2)
CHLORIDE SERPL-SCNC: 104 MMOL/L (ref 96–106)
CO2 SERPL-SCNC: 20 MMOL/L (ref 20–29)
CREAT SERPL-MCNC: 0.98 MG/DL (ref 0.76–1.27)
EGFRCR SERPLBLD CKD-EPI 2021: 93 ML/MIN/1.73
EST. AVERAGE GLUCOSE BLD GHB EST-MCNC: 154 MG/DL
GLUCOSE SERPL-MCNC: 203 MG/DL (ref 70–99)
HBA1C MFR BLD: 7 % (ref 4.8–5.6)
POTASSIUM SERPL-SCNC: 4.2 MMOL/L (ref 3.5–5.2)
SODIUM SERPL-SCNC: 139 MMOL/L (ref 134–144)
URATE SERPL-MCNC: 7.6 MG/DL (ref 3.8–8.4)

## 2023-03-01 NOTE — PROGRESS NOTES
Subjective:     Elke Rodriguez is a 47 y.o. male who presents for follow up of DM    No hypos    Lab Results   Component Value Date/Time    Hemoglobin A1c 7.0 (H) 02/08/2023 03:12 PM    Hemoglobin A1c 6.8 (H) 02/28/2022 02:43 PM    Hemoglobin A1c 7.2 (H) 08/04/2021 03:07 PM    Hemoglobin A1c, External 6.4 08/29/2022 12:00 AM    Glucose 203 (H) 02/08/2023 03:12 PM    Microalbumin/Creat ratio (mg/g creat) 5 10/04/2022 10:00 AM    Microalbumin,urine random 1.06 10/04/2022 10:00 AM    LDL, calculated 186 (H) 08/04/2021 03:07 PM    Creatinine (POC) 1.0 09/15/2020 12:30 PM    Creatinine 0.98 02/08/2023 03:12 PM         Patient Active Problem List   Diagnosis Code    Essential hypertension I10    Hypercholesteremia E78.00    Eczema L30.9    Gout M10.9    Moderate major depression (HCC) F32.1    Peripheral vascular disease (HCC) Z74.2    Helicobacter pylori infection A04.8    Fatty liver K76.0    Hyperglycemia due to type 2 diabetes mellitus ([address] E11.65    Abnormal cardiovascular stress test R94.39    Depression F32. A       New concerns: min c/o gout but uric acid still elevated wants to see pods, missed [facility] appt. they will not see him so he needs a new podiatrist.  Phone issues. Missed his CT scan of the lungs as well. Cardiac ROS:   Reports taking cardiovascuar medications without side affects. No complaints of exertional chest pain, excessive shortness of breath or focal weakness. Minimal swelling in lower legs or dizziness with standing. Review of Systems, additional:  Pertinent items are noted in HPI.       Patient Active Problem List    Diagnosis Date Noted    Abnormal cardiovascular stress test 11/03/2022    Hyperglycemia due to type 2 diabetes mellitus ([address] 08/25/2021    Fatty liver 04/47/4085    Helicobacter pylori infection 04/15/2020    Peripheral vascular disease ([facility] 01/03/2020    Essential hypertension 10/01/2019    Hypercholesteremia 10/01/2019    Eczema 10/01/2019    Gout 10/01/2019 Moderate major depression ([facility] 10/01/2019     Allergies   Allergen Reactions    Depakote [Divalproex] Anxiety     Past Medical History:   Diagnosis Date    Depression     Hypercholesterolemia     Hyperglycemia due to type 2 diabetes mellitus ([facility] 2021    Hypertension     Psychotic disorder ([place] [address]      Social History     Tobacco Use    Smoking status: Former     Packs/day: 0.75     Years: 32.00     Pack years: 24.00     Types: Cigarettes     Quit date: 2021     Years since quittin.6    Smokeless tobacco: Never   Substance Use Topics    Alcohol use: Not Currently     Alcohol/week: 1.0 standard drink     Types: 1 Cans of beer per week     Comment: occ        Lab Results   Component Value Date/Time    GFR est non-AA 85 2021 03:07 PM    GFRNA, POC >60 09/15/2020 12:30 PM    GFR est AA 98 2021 03:07 PM    GFRAA, POC >60 09/15/2020 12:30 PM    Creatinine 0.98 2023 03:12 PM    Creatinine (POC) 1.0 09/15/2020 12:30 PM    BUN 10 2023 03:12 PM    Sodium 139 2023 03:12 PM    Potassium 4.2 2023 03:12 PM    Chloride 104 2023 03:12 PM    CO2 20 2023 03:12 PM    Magnesium 1.9 2022 11:29 AM     Lab Results   Component Value Date/Time    Glucose 203 (H) 2023 03:12 PM             Objective:     Physical exam significant for the following:     No acute distress  Visit Vitals  /85 (BP 1 Location: Left arm, BP Patient Position: Sitting, BP Cuff Size: Adult)   Pulse 89   Temp 98.6 °F (37 °C) (Temporal)   Resp 16   Ht 5' 11\" (1.803 m)   Wt 240 lb (108.9 kg)   SpO2 97%   BMI 33.47 kg/m²         . Assessment/Plan:     diabetes well controlled, stable. ICD-10-CM ICD-9-CM    1. Type 2 diabetes mellitus with hyperglycemia, without long-term current use of insulin (HCC)  E11.65 250.00      790.29       2. Moderate major depression ([facility])  F32.1 296.22       3. Hypercholesteremia  E78.00 272.0 rosuvastatin (CRESTOR) 40 mg tablet      4.  Idiopathic gout, unspecified chronicity, unspecified site  M10.00 274.9 REFERRAL TO PODIATRY      5. Essential hypertension  I10 401.9       6. Pain in both feet  M79.671 729.5     M79.672              Orders Placed This Encounter    REFERRAL TO PODIATRY     Referral Priority:   Routine     Referral Type:   Consultation     Referral Reason:   Specialty Services Required     Referred to Provider:   Amy Da Silva DPM     Number of Visits Requested:   1    allopurinoL (ZYLOPRIM) 100 mg tablet     Sig: Take 1 Tablet by mouth daily. Dispense:  90 Tablet     Refill:  1    rosuvastatin (CRESTOR) 40 mg tablet     Sig: Take 1 Tablet by mouth nightly.      Dispense:  90 Tablet     Refill:  3     Reschedule CT scan of the lungs    Follow-up 3 months

## 2023-03-02 ENCOUNTER — OFFICE VISIT (OUTPATIENT)
Dept: INTERNAL MEDICINE CLINIC | Age: 53
End: 2023-03-02

## 2023-03-02 ENCOUNTER — TRANSCRIBE ORDER (OUTPATIENT)
Dept: SCHEDULING | Age: 53
End: 2023-03-02

## 2023-03-02 VITALS
SYSTOLIC BLOOD PRESSURE: 124 MMHG | TEMPERATURE: 98.6 F | OXYGEN SATURATION: 97 % | HEIGHT: 71 IN | RESPIRATION RATE: 16 BRPM | BODY MASS INDEX: 33.6 KG/M2 | HEART RATE: 89 BPM | DIASTOLIC BLOOD PRESSURE: 85 MMHG | WEIGHT: 240 LBS

## 2023-03-02 DIAGNOSIS — E11.65 TYPE 2 DIABETES MELLITUS WITH HYPERGLYCEMIA, WITHOUT LONG-TERM CURRENT USE OF INSULIN (HCC): Primary | ICD-10-CM

## 2023-03-02 DIAGNOSIS — Z87.891 PERSONAL HISTORY OF TOBACCO USE, PRESENTING HAZARDS TO HEALTH: Primary | ICD-10-CM

## 2023-03-02 DIAGNOSIS — M79.671 PAIN IN BOTH FEET: ICD-10-CM

## 2023-03-02 DIAGNOSIS — F32.1 MODERATE MAJOR DEPRESSION (HCC): ICD-10-CM

## 2023-03-02 DIAGNOSIS — E78.00 HYPERCHOLESTEREMIA: ICD-10-CM

## 2023-03-02 DIAGNOSIS — M10.00 IDIOPATHIC GOUT, UNSPECIFIED CHRONICITY, UNSPECIFIED SITE: ICD-10-CM

## 2023-03-02 DIAGNOSIS — M79.672 PAIN IN BOTH FEET: ICD-10-CM

## 2023-03-02 DIAGNOSIS — I10 ESSENTIAL HYPERTENSION: ICD-10-CM

## 2023-03-02 PROBLEM — F32.A DEPRESSION: Status: RESOLVED | Noted: 2023-01-04 | Resolved: 2023-03-02

## 2023-03-02 RX ORDER — ALLOPURINOL 100 MG/1
100 TABLET ORAL DAILY
Qty: 90 TABLET | Refills: 1 | Status: SHIPPED | OUTPATIENT
Start: 2023-03-02

## 2023-03-02 RX ORDER — ROSUVASTATIN CALCIUM 40 MG/1
40 TABLET, COATED ORAL
Qty: 90 TABLET | Refills: 3 | Status: SHIPPED | OUTPATIENT
Start: 2023-03-02

## 2023-03-02 NOTE — PROGRESS NOTES
Chief Complaint   Patient presents with    Labs     FU     Patient has not been out of the country in (14 months), NO diarrhea, NO cough, NO chest conjestion, NO temp. Pt has not been around anyone with these symptoms. Health Maintenance reviewed. I have reviewed the patient's medical history in detail and updated the computerized patient record. 1. \"Have you been to the ER, urgent care clinic since your last visit? No  Hospitalized since your last visit? \" no    2. \"Have you seen or consulted any other health care providers outside of the [address] [place] [address] since your last visit? \" no     3. For patients aged 39-70: Has the patient had a colonoscopy / FIT/ Cologuard? no     Encouraged pt to discuss pt's wishes with spouse/partner/family and bring them in the next appt to follow thru with the Advanced Directive    @  Fall Risk Assessment, last 12 mths 2/18/2021   Able to walk? Yes   Fall in past 12 months? 0   Do you feel unsteady? 0   Are you worried about falling 0       3 most recent PHQ Screens 3/2/2023   Little interest or pleasure in doing things Several days   Feeling down, depressed, irritable, or hopeless Several days   Total Score PHQ 2 2   Trouble falling or staying asleep, or sleeping too much -   Feeling tired or having little energy -   Poor appetite, weight loss, or overeating -   Feeling bad about yourself - or that you are a failure or have let yourself or your family down -   Trouble concentrating on things such as school, work, reading, or watching TV -   Moving or speaking so slowly that other people could have noticed; or the opposite being so fidgety that others notice -   Thoughts of being better off dead, or hurting yourself in some way -   PHQ 9 Score -   How difficult have these problems made it for you to do your work, take care of your home and get along with others -       Abuse Screening Questionnaire 9/1/2021   Do you ever feel afraid of your partner?  N   Are you in a relationship with someone who physically or mentally threatens you? N   Is it safe for you to go home?  Y       ADL Assessment 9/1/2021   Feeding yourself No Help Needed   Getting from bed to chair No Help Needed   Getting dressed No Help Needed   Bathing or showering No Help Needed   Walk across the room (includes cane/walker) No Help Needed   Using the telphone -   Taking your medications No Help Needed   Preparing meals No Help Needed   Managing money (expenses/bills) No Help Needed   Moderately strenuous housework (laundry) No Help Needed   Shopping for personal items (toiletries/medicines) Help Needed   Shopping for groceries Help Needed   Driving Help Needed   Climbing a flight of stairs -   Getting to places beyond walking distances Help Needed

## 2023-03-22 ENCOUNTER — DOCUMENTATION ONLY (OUTPATIENT)
Dept: FAMILY MEDICINE CLINIC | Age: 53
End: 2023-03-22

## 2023-03-22 DIAGNOSIS — M10.00 IDIOPATHIC GOUT, UNSPECIFIED CHRONICITY, UNSPECIFIED SITE: ICD-10-CM

## 2023-03-22 RX ORDER — COLCHICINE 0.6 MG/1
0.6 CAPSULE ORAL DAILY
Qty: 30 CAPSULE | Refills: 2 | Status: SHIPPED | OUTPATIENT
Start: 2023-03-22

## 2023-03-22 NOTE — PROGRESS NOTES
Amie Espinosa - Rx #: [id number]  Status  Sent to Plan today. Please follow up as needed,Colchicine 0.6 mg capsules is the plan preferred. tablets is non preferred medication. If denial is received by fax,please cancel order for capsules and send new script if no contraindications.(FYI) Please note. Allopurinol is ready for pickup Ameena at [facility] stated text was sent,awaiting pickup for 6 days. Please advise patient. Thanks  Drug  Colchicine 0.6MG capsules  Form  [employer] Form (0404 NCPDP)  Original Claim Info  70,MR *[id number]* Preferred products are: [id number] - COLCHICINE TAB 0.6MG  Health Plans Preferred Product  COLCHICINE TAB 0.6MG [id number]

## 2023-04-17 DIAGNOSIS — M10.00 IDIOPATHIC GOUT, UNSPECIFIED CHRONICITY, UNSPECIFIED SITE: ICD-10-CM

## 2023-04-17 DIAGNOSIS — M19.90 ARTHRITIS: ICD-10-CM

## 2023-04-19 ENCOUNTER — DOCUMENTATION ONLY (OUTPATIENT)
Dept: FAMILY MEDICINE CLINIC | Age: 53
End: 2023-04-19

## 2023-04-19 RX ORDER — COLCHICINE 0.6 MG/1
0.6 TABLET ORAL DAILY
Qty: 10 TABLET | Refills: 5 | Status: SHIPPED | OUTPATIENT
Start: 2023-04-19

## 2023-04-19 RX ORDER — PREDNISONE 20 MG/1
TABLET ORAL
Qty: 10 TABLET | Refills: 0 | Status: SHIPPED | OUTPATIENT
Start: 2023-04-19

## 2023-04-19 NOTE — PROGRESS NOTES
Key: [id number] - Rx #: [id number]  Status  New(Not sent to plan) Please follow up as needed,please send in new script and cancel old script for capsules if no contraindications. Thank you.   Drug  Colchicine 0.6MG capsules  Form  [facility] Electronic PA Form (3334 NCPDP)  Original Claim Info  70,MR *[id number]* Preferred products are: [id number] - COLCHICINE TAB 0.6MG  Health Plans Preferred Product  COLCHICINE TAB 0.6MG [id number]

## 2023-04-26 DIAGNOSIS — R07.9 CHEST PAIN, UNSPECIFIED TYPE: ICD-10-CM

## 2023-05-20 RX ORDER — TRIAMCINOLONE ACETONIDE 1 MG/G
CREAM TOPICAL
Qty: 45 G | Refills: 0 | Status: SHIPPED | OUTPATIENT
Start: 2023-05-20

## 2023-05-20 RX ORDER — PREDNISONE 20 MG/1
TABLET ORAL
Qty: 10 TABLET | Refills: 0 | Status: SHIPPED | OUTPATIENT
Start: 2023-05-20

## 2023-05-24 RX ORDER — TRIAMCINOLONE ACETONIDE 1 MG/G
CREAM TOPICAL
COMMUNITY
Start: 2023-01-10 | End: 2023-06-01 | Stop reason: ALTCHOICE

## 2023-05-24 RX ORDER — HALOPERIDOL 2 MG/1
1 TABLET ORAL DAILY
COMMUNITY
Start: 2021-12-27

## 2023-05-24 RX ORDER — COLCHICINE 0.6 MG/1
0.6 TABLET ORAL AS NEEDED
COMMUNITY
Start: 2023-04-19

## 2023-05-24 RX ORDER — PREDNISONE 20 MG/1
TABLET ORAL
COMMUNITY
Start: 2023-04-19 | End: 2023-06-01 | Stop reason: SDUPTHER

## 2023-05-24 RX ORDER — OMEPRAZOLE 40 MG/1
1 CAPSULE, DELAYED RELEASE ORAL DAILY
COMMUNITY
Start: 2023-01-10 | End: 2023-06-01 | Stop reason: SDUPTHER

## 2023-05-24 RX ORDER — NAPROXEN 500 MG/1
500 TABLET ORAL AS NEEDED
COMMUNITY
Start: 2023-01-04 | End: 2023-06-01 | Stop reason: SDUPTHER

## 2023-05-24 RX ORDER — ALLOPURINOL 300 MG/1
300 TABLET ORAL DAILY
COMMUNITY
Start: 2022-10-20

## 2023-05-24 RX ORDER — SILDENAFIL 100 MG/1
1 TABLET, FILM COATED ORAL PRN
COMMUNITY
Start: 2021-07-12 | End: 2023-06-03 | Stop reason: ALTCHOICE

## 2023-05-24 RX ORDER — ALLOPURINOL 100 MG/1
100 TABLET ORAL DAILY
COMMUNITY
Start: 2023-03-02

## 2023-05-24 RX ORDER — DULOXETIN HYDROCHLORIDE 20 MG/1
20 CAPSULE, DELAYED RELEASE ORAL DAILY
COMMUNITY

## 2023-05-24 RX ORDER — ASPIRIN 81 MG/1
81 TABLET ORAL DAILY
COMMUNITY
Start: 2022-11-03

## 2023-05-24 RX ORDER — LOSARTAN POTASSIUM 100 MG/1
100 TABLET ORAL DAILY
COMMUNITY
Start: 2022-10-20 | End: 2023-06-01 | Stop reason: SDUPTHER

## 2023-05-24 RX ORDER — ROSUVASTATIN CALCIUM 40 MG/1
1 TABLET, COATED ORAL NIGHTLY
COMMUNITY
Start: 2023-03-02 | End: 2023-06-01 | Stop reason: SDUPTHER

## 2023-05-29 NOTE — PROGRESS NOTES
Subjective:     Lonnie Bernal is a 47 y.o. male who presents for follow up of gout DM, chest pain    Patient Active Problem List   Diagnosis    Peripheral vascular disease ([address]    Helicobacter pylori infection    Essential hypertension    Hyperglycemia due to type 2 diabetes mellitus (HCC)    Moderate major depression (HCC)    Hypercholesteremia    Eczema    Gout    Fatty liver    Abnormal cardiovascular stress test       New concerns: went to ER with CP says was not cardiac, he had a cardiac cath which was done last year. Showed no evidence of coronary disease we discussed this. Describes the chest pain is center left comes and goes. Mood continue so-so see psychiatry. Psychiatric ROS:   Reports taking psychiatric medications without side affects. No complaints of abnormal movements, suicidal ideation or focal weakness. Review of Systems, additional:  Pertinent items are noted in HPI.       Patient Active Problem List    Diagnosis Date Noted    Hx of cardiac cath 2023    Abnormal cardiovascular stress test 2022    Hyperglycemia due to type 2 diabetes mellitus ([address] 2021    Fatty liver     Helicobacter pylori infection 04/15/2020    Peripheral vascular disease ([address] 2020    Essential hypertension 10/01/2019    Moderate major depression ([address] 10/01/2019    Hypercholesteremia 10/01/2019    Eczema 10/01/2019    Gout 10/01/2019     Allergies   Allergen Reactions    Valproic Acid Anxiety     Past Medical History:   Diagnosis Date    Depression     Hypercholesterolemia     Hyperglycemia due to type 2 diabetes mellitus ([address] 2021    Hypertension     Psychotic disorder ([address]      Social History     Tobacco Use    Smoking status: Former     Packs/day: 0.33     Years: 30.00     Pack years: 9.90     Types: Cigarettes     Quit date: 2021     Years since quittin.9    Smokeless tobacco: Never   Substance Use Topics    Alcohol use: Not Currently     Alcohol/week: 1.0

## 2023-06-01 ENCOUNTER — OFFICE VISIT (OUTPATIENT)
Facility: CLINIC | Age: 53
End: 2023-06-01
Payer: MEDICAID

## 2023-06-01 VITALS
SYSTOLIC BLOOD PRESSURE: 125 MMHG | BODY MASS INDEX: 33.57 KG/M2 | TEMPERATURE: 98 F | WEIGHT: 239.8 LBS | HEIGHT: 71 IN | HEART RATE: 87 BPM | DIASTOLIC BLOOD PRESSURE: 86 MMHG | RESPIRATION RATE: 16 BRPM | OXYGEN SATURATION: 98 %

## 2023-06-01 DIAGNOSIS — Z13.220 SCREENING CHOLESTEROL LEVEL: ICD-10-CM

## 2023-06-01 DIAGNOSIS — Z11.59 ENCOUNTER FOR HEPATITIS C SCREENING TEST FOR LOW RISK PATIENT: ICD-10-CM

## 2023-06-01 DIAGNOSIS — E11.65 TYPE 2 DIABETES MELLITUS WITH HYPERGLYCEMIA, WITHOUT LONG-TERM CURRENT USE OF INSULIN (HCC): Primary | ICD-10-CM

## 2023-06-01 DIAGNOSIS — I10 ESSENTIAL HYPERTENSION: ICD-10-CM

## 2023-06-01 DIAGNOSIS — L03.211 CELLULITIS, FACE: ICD-10-CM

## 2023-06-01 DIAGNOSIS — M10.9 GOUT OF FOOT, UNSPECIFIED CAUSE, UNSPECIFIED CHRONICITY, UNSPECIFIED LATERALITY: ICD-10-CM

## 2023-06-01 DIAGNOSIS — Z98.890 HX OF CARDIAC CATH: ICD-10-CM

## 2023-06-01 DIAGNOSIS — E78.00 HYPERCHOLESTEREMIA: ICD-10-CM

## 2023-06-01 PROCEDURE — 99214 OFFICE O/P EST MOD 30 MIN: CPT | Performed by: FAMILY MEDICINE

## 2023-06-01 PROCEDURE — 3051F HG A1C>EQUAL 7.0%<8.0%: CPT | Performed by: FAMILY MEDICINE

## 2023-06-01 PROCEDURE — 3078F DIAST BP <80 MM HG: CPT | Performed by: FAMILY MEDICINE

## 2023-06-01 PROCEDURE — 3074F SYST BP LT 130 MM HG: CPT | Performed by: FAMILY MEDICINE

## 2023-06-01 RX ORDER — LOSARTAN POTASSIUM 100 MG/1
100 TABLET ORAL DAILY
Qty: 90 TABLET | Refills: 3 | Status: SHIPPED | OUTPATIENT
Start: 2023-06-01

## 2023-06-01 RX ORDER — TRAZODONE HYDROCHLORIDE 50 MG/1
TABLET ORAL
COMMUNITY
Start: 2023-05-19

## 2023-06-01 RX ORDER — AMLODIPINE BESYLATE 5 MG/1
5 TABLET ORAL DAILY
COMMUNITY
Start: 2023-05-11

## 2023-06-01 RX ORDER — PREDNISONE 20 MG/1
20 TABLET ORAL DAILY
Qty: 10 TABLET | Refills: 2 | Status: SHIPPED | OUTPATIENT
Start: 2023-06-01 | End: 2023-06-06

## 2023-06-01 RX ORDER — ROSUVASTATIN CALCIUM 40 MG/1
40 TABLET, COATED ORAL NIGHTLY
Qty: 90 TABLET | Refills: 1 | Status: SHIPPED | OUTPATIENT
Start: 2023-06-01

## 2023-06-01 RX ORDER — NAPROXEN 500 MG/1
500 TABLET ORAL 2 TIMES DAILY WITH MEALS
Qty: 180 TABLET | Refills: 3 | Status: SHIPPED | OUTPATIENT
Start: 2023-06-01

## 2023-06-01 RX ORDER — OMEPRAZOLE 40 MG/1
40 CAPSULE, DELAYED RELEASE ORAL DAILY
Qty: 90 CAPSULE | Refills: 3 | Status: SHIPPED | OUTPATIENT
Start: 2023-06-01

## 2023-06-01 ASSESSMENT — PATIENT HEALTH QUESTIONNAIRE - PHQ9
SUM OF ALL RESPONSES TO PHQ QUESTIONS 1-9: 2
1. LITTLE INTEREST OR PLEASURE IN DOING THINGS: 1
SUM OF ALL RESPONSES TO PHQ QUESTIONS 1-9: 2
SUM OF ALL RESPONSES TO PHQ QUESTIONS 1-9: 2
2. FEELING DOWN, DEPRESSED OR HOPELESS: 1
SUM OF ALL RESPONSES TO PHQ9 QUESTIONS 1 & 2: 2
SUM OF ALL RESPONSES TO PHQ QUESTIONS 1-9: 2

## 2023-06-01 NOTE — PROGRESS NOTES
Chief Complaint   Patient presents with    Hypertension    Diabetes    Cholesterol Problem     3 month f/u       1. Have you been to the ER, urgent care clinic since your last visit? Hospitalized since your last visit? Yes When: [facility] 1 month ago for pain in heart    2. Have you seen or consulted any other health care providers outside of the [address] [place] [address] since your last visit? Include any pap smears or colon screening.  Yes When: Cardiologist Dr. Brandin Sr 1 month ago    /86 (Site: Left Upper Arm, Position: Sitting)   Pulse 87   Temp 98 °F (36.7 °C) (Oral)   Resp 16   Ht 5' 11\" (1.803 m)   Wt 239 lb 12.8 oz (108.8 kg)   SpO2 98%   BMI 33.45 kg/m²

## 2023-07-16 PROBLEM — E11.65 TYPE 2 DIABETES MELLITUS WITH HYPERGLYCEMIA, WITHOUT LONG-TERM CURRENT USE OF INSULIN (HCC): Status: ACTIVE | Noted: 2021-08-25

## 2023-07-18 NOTE — PROGRESS NOTES
Subjective:     Gene Sales is a 47 y.o. male seen for follow-up of diabetes. He has had hypoglycemic attacks. .no  Blood sugar control has been ok    Hemoglobin A1C   Date Value Ref Range Status   02/08/2023 7.0 (H) 4.8 - 5.6 % Final     Comment:              Prediabetes: 5.7 - 6.4           Diabetes: >6.4           Glycemic control for adults with diabetes: <7.0       Lab Results   Component Value Date/Time     02/08/2023 03:12 PM    K 4.2 02/08/2023 03:12 PM     02/08/2023 03:12 PM    CO2 20 02/08/2023 03:12 PM    BUN 10 02/08/2023 03:12 PM    CREATININE 0.98 02/08/2023 03:12 PM    GLUCOSE 203 02/08/2023 03:12 PM    CALCIUM 9.2 02/08/2023 03:12 PM    LABGLOM 93 02/08/2023 03:12 PM          He has diabetes, hypertension, and hyperlipidemia. Gene Sales has the additional concern of went to ER with chest congestion says kidneys not working right, dizziness now on amlodipine chlorthalidone and losartran, Rx by cards    Reports taking blood pressure medications with dizziness. Stood up got very dizziness so went to ER. Diet and Lifestyle: generally follows a low sodium diet, and .   In general follows low carbohydrate minimal sweet food intake diet    Patient Active Problem List    Diagnosis Date Noted    Hx of cardiac cath 06/01/2023    Abnormal cardiovascular stress test 11/03/2022    Type 2 diabetes mellitus with hyperglycemia, without long-term current use of insulin ([address]) 08/25/2021    Fatty liver 09/03/5861    Helicobacter pylori infection 04/15/2020    Peripheral vascular disease ([address]) 01/03/2020    Essential hypertension 10/01/2019    Moderate major depression ([address]) 10/01/2019    Hypercholesteremia 10/01/2019    Eczema 10/01/2019    Gout 10/01/2019     Allergies   Allergen Reactions    Valproic Acid Anxiety     Past Medical History:   Diagnosis Date    Depression     Hypercholesterolemia     Hyperglycemia due to type 2 diabetes mellitus ([address]) 8/25/2021    Hypertension     Psychotic

## 2023-07-19 ENCOUNTER — OFFICE VISIT (OUTPATIENT)
Facility: CLINIC | Age: 53
End: 2023-07-19
Payer: MEDICAID

## 2023-07-19 VITALS
OXYGEN SATURATION: 97 % | RESPIRATION RATE: 18 BRPM | SYSTOLIC BLOOD PRESSURE: 92 MMHG | BODY MASS INDEX: 33.32 KG/M2 | HEIGHT: 71 IN | HEART RATE: 96 BPM | TEMPERATURE: 97.5 F | DIASTOLIC BLOOD PRESSURE: 64 MMHG | WEIGHT: 238 LBS

## 2023-07-19 DIAGNOSIS — I10 ESSENTIAL HYPERTENSION: ICD-10-CM

## 2023-07-19 DIAGNOSIS — E11.65 TYPE 2 DIABETES MELLITUS WITH HYPERGLYCEMIA, WITHOUT LONG-TERM CURRENT USE OF INSULIN (HCC): Primary | ICD-10-CM

## 2023-07-19 DIAGNOSIS — N18.30 CRI (CHRONIC RENAL INSUFFICIENCY), STAGE 3 (MODERATE) (HCC): ICD-10-CM

## 2023-07-19 DIAGNOSIS — F32.1 MODERATE MAJOR DEPRESSION (HCC): ICD-10-CM

## 2023-07-19 DIAGNOSIS — R05.2 SUBACUTE COUGH: ICD-10-CM

## 2023-07-19 PROCEDURE — 3078F DIAST BP <80 MM HG: CPT | Performed by: FAMILY MEDICINE

## 2023-07-19 PROCEDURE — 3051F HG A1C>EQUAL 7.0%<8.0%: CPT | Performed by: FAMILY MEDICINE

## 2023-07-19 PROCEDURE — 99214 OFFICE O/P EST MOD 30 MIN: CPT | Performed by: FAMILY MEDICINE

## 2023-07-19 PROCEDURE — 3074F SYST BP LT 130 MM HG: CPT | Performed by: FAMILY MEDICINE

## 2023-07-19 RX ORDER — AMLODIPINE BESYLATE 2.5 MG/1
2.5 TABLET ORAL DAILY
Qty: 30 TABLET | Refills: 5 | Status: SHIPPED | OUTPATIENT
Start: 2023-07-19

## 2023-07-19 RX ORDER — CHLORTHALIDONE 25 MG/1
25 TABLET ORAL DAILY
Qty: 30 TABLET | Refills: 11 | COMMUNITY
Start: 2022-11-21 | End: 2023-11-21

## 2023-07-19 RX ORDER — AZITHROMYCIN 250 MG/1
250 TABLET, FILM COATED ORAL SEE ADMIN INSTRUCTIONS
Qty: 6 TABLET | Refills: 0 | Status: SHIPPED | OUTPATIENT
Start: 2023-07-19 | End: 2023-07-24

## 2023-07-19 RX ORDER — LOSARTAN POTASSIUM 25 MG/1
25 TABLET ORAL DAILY
Qty: 30 TABLET | Refills: 5 | Status: SHIPPED | OUTPATIENT
Start: 2023-07-19

## 2023-07-19 ASSESSMENT — PATIENT HEALTH QUESTIONNAIRE - PHQ9
10. IF YOU CHECKED OFF ANY PROBLEMS, HOW DIFFICULT HAVE THESE PROBLEMS MADE IT FOR YOU TO DO YOUR WORK, TAKE CARE OF THINGS AT HOME, OR GET ALONG WITH OTHER PEOPLE: 0
8. MOVING OR SPEAKING SO SLOWLY THAT OTHER PEOPLE COULD HAVE NOTICED. OR THE OPPOSITE, BEING SO FIGETY OR RESTLESS THAT YOU HAVE BEEN MOVING AROUND A LOT MORE THAN USUAL: 0
4. FEELING TIRED OR HAVING LITTLE ENERGY: 0
6. FEELING BAD ABOUT YOURSELF - OR THAT YOU ARE A FAILURE OR HAVE LET YOURSELF OR YOUR FAMILY DOWN: 0
7. TROUBLE CONCENTRATING ON THINGS, SUCH AS READING THE NEWSPAPER OR WATCHING TELEVISION: 0
SUM OF ALL RESPONSES TO PHQ QUESTIONS 1-9: 0
2. FEELING DOWN, DEPRESSED OR HOPELESS: 0
SUM OF ALL RESPONSES TO PHQ QUESTIONS 1-9: 0
2. FEELING DOWN, DEPRESSED OR HOPELESS: 0
1. LITTLE INTEREST OR PLEASURE IN DOING THINGS: 0
9. THOUGHTS THAT YOU WOULD BE BETTER OFF DEAD, OR OF HURTING YOURSELF: 0
SUM OF ALL RESPONSES TO PHQ9 QUESTIONS 1 & 2: 0
3. TROUBLE FALLING OR STAYING ASLEEP: 0
3. TROUBLE FALLING OR STAYING ASLEEP: 0
8. MOVING OR SPEAKING SO SLOWLY THAT OTHER PEOPLE COULD HAVE NOTICED. OR THE OPPOSITE, BEING SO FIGETY OR RESTLESS THAT YOU HAVE BEEN MOVING AROUND A LOT MORE THAN USUAL: 0
SUM OF ALL RESPONSES TO PHQ QUESTIONS 1-9: 0
5. POOR APPETITE OR OVEREATING: 0
SUM OF ALL RESPONSES TO PHQ QUESTIONS 1-9: 0
9. THOUGHTS THAT YOU WOULD BE BETTER OFF DEAD, OR OF HURTING YOURSELF: 0
5. POOR APPETITE OR OVEREATING: 0
7. TROUBLE CONCENTRATING ON THINGS, SUCH AS READING THE NEWSPAPER OR WATCHING TELEVISION: 0
6. FEELING BAD ABOUT YOURSELF - OR THAT YOU ARE A FAILURE OR HAVE LET YOURSELF OR YOUR FAMILY DOWN: 0
4. FEELING TIRED OR HAVING LITTLE ENERGY: 0
SUM OF ALL RESPONSES TO PHQ QUESTIONS 1-9: 0
SUM OF ALL RESPONSES TO PHQ QUESTIONS 1-9: 0
10. IF YOU CHECKED OFF ANY PROBLEMS, HOW DIFFICULT HAVE THESE PROBLEMS MADE IT FOR YOU TO DO YOUR WORK, TAKE CARE OF THINGS AT HOME, OR GET ALONG WITH OTHER PEOPLE: 0
SUM OF ALL RESPONSES TO PHQ QUESTIONS 1-9: 0
SUM OF ALL RESPONSES TO PHQ9 QUESTIONS 1 & 2: 0
1. LITTLE INTEREST OR PLEASURE IN DOING THINGS: 0
SUM OF ALL RESPONSES TO PHQ QUESTIONS 1-9: 0

## 2023-07-19 NOTE — PROGRESS NOTES
Rajinder Oliva is a 47 y.o. male  Chief Complaint   Patient presents with    Follow-Up from Hospital    Dizziness     Chief Complaint   Patient presents with    Follow-Up from [address] [place] [address] Maintenance Due   Topic Date Due    COVID-19 Vaccine (1) Never done    Pneumococcal 0-64 years Vaccine (1 - PCV) Never done    HIV screen  Never done    Diabetic retinal exam  Never done    Hepatitis B vaccine (1 of 3 - Risk 3-dose series) Never done    DTaP/Tdap/Td vaccine (1 - Tdap) Never done    Shingles vaccine (1 of 2) Never done    Lipids  08/04/2022           1. \"Have you been to the ER, urgent care clinic since your last visit? Hospitalized since your last visit? \" Yes Where: [facility] Reason for visit: dizziness    2. \"Have you seen or consulted any other health care providers outside of the [address] [place] [address] Avenue since your last visit? \" Yes Where: [facility] Reason for visit: [facility] Sables      3. For patients aged 43-73: Has the patient had a colonoscopy / FIT/ Cologuard? Yes - Care Gap present. Most recent result on file      If the patient is female:    4. For patients aged 43-66: Has the patient had a mammogram within the past 2 years? NA - based on age or sex      11. For patients aged 21-65: Has the patient had a pap smear?  NA - based on age or sex   Health Maintenance   Topic Date Due    COVID-19 Vaccine (1) Never done    Pneumococcal 0-64 years Vaccine (1 - PCV) Never done    HIV screen  Never done    Diabetic retinal exam  Never done    Hepatitis B vaccine (1 of 3 - Risk 3-dose series) Never done    DTaP/Tdap/Td vaccine (1 - Tdap) Never done    Shingles vaccine (1 of 2) Never done    Lipids  08/04/2022    Flu vaccine (1) 08/01/2023    Diabetic foot exam  08/23/2023    Diabetic Alb to Cr ratio (uACR) test  10/04/2023    A1C test (Diabetic or Prediabetic)  02/08/2024    GFR test (Diabetes, CKD 3-4, OR last GFR 15-59)  02/08/2024    Depression Monitoring  06/01/2024    Colorectal

## 2023-07-25 RX ORDER — TRIAMCINOLONE ACETONIDE 1 MG/G
CREAM TOPICAL
Qty: 45 G | Refills: 2 | Status: SHIPPED | OUTPATIENT
Start: 2023-07-25

## 2023-07-26 ENCOUNTER — CLINICAL DOCUMENTATION (OUTPATIENT)
Age: 53
End: 2023-07-26

## 2023-07-26 NOTE — PROGRESS NOTES
Key: [id number] - PA Case ID: [id number] - Rx #: [id number] Outcome  PA Case: [id number], Status: Approved, Coverage Starts on: 7/26/2023 12:00:00 AM, Coverage Ends on: 7/25/2024 12:00:00 AM.  Drug  Omeprazole 40MG dr capsules  Form  [employer] Electronic PA Form (1897 NCPDP)

## 2023-08-11 LAB
ALBUMIN SERPL-MCNC: 4.7 G/DL (ref 3.8–4.9)
ALBUMIN/GLOB SERPL: 1.5 {RATIO} (ref 1.2–2.2)
ALP SERPL-CCNC: 47 IU/L (ref 44–121)
ALT SERPL-CCNC: 23 IU/L (ref 0–44)
AST SERPL-CCNC: 19 IU/L (ref 0–40)
BILIRUB SERPL-MCNC: 0.3 MG/DL (ref 0–1.2)
BUN SERPL-MCNC: 13 MG/DL (ref 6–24)
BUN/CREAT SERPL: 12 (ref 9–20)
CALCIUM SERPL-MCNC: 8.9 MG/DL (ref 8.7–10.2)
CHLORIDE SERPL-SCNC: 101 MMOL/L (ref 96–106)
CO2 SERPL-SCNC: 22 MMOL/L (ref 20–29)
CREAT SERPL-MCNC: 1.1 MG/DL (ref 0.76–1.27)
EGFRCR SERPLBLD CKD-EPI 2021: 81 ML/MIN/1.73
GLOBULIN SER CALC-MCNC: 3.2 G/DL (ref 1.5–4.5)
GLUCOSE SERPL-MCNC: 144 MG/DL (ref 70–99)
HBA1C MFR BLD: 7.4 % (ref 4.8–5.6)
POTASSIUM SERPL-SCNC: 3.8 MMOL/L (ref 3.5–5.2)
PROT SERPL-MCNC: 7.9 G/DL (ref 6–8.5)
SODIUM SERPL-SCNC: 140 MMOL/L (ref 134–144)

## 2023-08-20 NOTE — PROGRESS NOTES
Subjective:     Stanislaw Brooks is a 47 y.o. male who presents for follow up of   Patient Active Problem List   Diagnosis    Peripheral vascular disease ([address])    Helicobacter pylori infection    Essential hypertension    Type 2 diabetes mellitus with hyperglycemia, without long-term current use of insulin (HCC)    Moderate major depression (HCC)    Hypercholesteremia    Eczema    Gout    Fatty liver    Abnormal cardiovascular stress test    Hx of cardiac cath       New concerns: labs F/up  Arthritis tearing me up, worried about his kidneys    Hemoglobin A1C   Date Value Ref Range Status   08/10/2023 7.4 (H) 4.8 - 5.6 % Final     Comment:              Prediabetes: 5.7 - 6.4           Diabetes: >6.4           Glycemic control for adults with diabetes: <7.0       Lab Results   Component Value Date/Time     08/10/2023 10:42 AM    K 3.8 08/10/2023 10:42 AM     08/10/2023 10:42 AM    CO2 22 08/10/2023 10:42 AM    BUN 13 08/10/2023 10:42 AM    CREATININE 1.10 08/10/2023 10:42 AM    GLUCOSE 144 08/10/2023 10:42 AM    CALCIUM 8.9 08/10/2023 10:42 AM    LABGLOM 81 08/10/2023 10:42 AM          Cardiac ROS:   Reports taking cardiovascuar medications without side affects. No complaints of exertional chest pain, excessive shortness of breath or focal weakness. Minimal swelling in lower legs or dizziness with standing. Review of Systems, additional:  Pertinent items are noted in HPI.       Patient Active Problem List    Diagnosis Date Noted    Hx of cardiac cath 06/01/2023    Abnormal cardiovascular stress test 11/03/2022    Type 2 diabetes mellitus with hyperglycemia, without long-term current use of insulin ([address]) 08/25/2021    Fatty liver 23/42/5703    Helicobacter pylori infection 04/15/2020    Peripheral vascular disease ([address]) 01/03/2020    Essential hypertension 10/01/2019    Moderate major depression ([address]) 10/01/2019    Hypercholesteremia 10/01/2019    Eczema 10/01/2019    Gout 10/01/2019     Allergies

## 2023-08-21 ENCOUNTER — OFFICE VISIT (OUTPATIENT)
Facility: CLINIC | Age: 53
End: 2023-08-21
Payer: MEDICAID

## 2023-08-21 VITALS
HEIGHT: 71 IN | BODY MASS INDEX: 33.32 KG/M2 | SYSTOLIC BLOOD PRESSURE: 125 MMHG | OXYGEN SATURATION: 97 % | WEIGHT: 238 LBS | DIASTOLIC BLOOD PRESSURE: 88 MMHG | TEMPERATURE: 98.6 F | RESPIRATION RATE: 16 BRPM | HEART RATE: 103 BPM

## 2023-08-21 DIAGNOSIS — E11.65 TYPE 2 DIABETES MELLITUS WITH HYPERGLYCEMIA, WITHOUT LONG-TERM CURRENT USE OF INSULIN (HCC): Primary | ICD-10-CM

## 2023-08-21 DIAGNOSIS — I10 ESSENTIAL HYPERTENSION: ICD-10-CM

## 2023-08-21 DIAGNOSIS — E78.00 HYPERCHOLESTEREMIA: ICD-10-CM

## 2023-08-21 DIAGNOSIS — M10.00 IDIOPATHIC GOUT, UNSPECIFIED CHRONICITY, UNSPECIFIED SITE: ICD-10-CM

## 2023-08-21 DIAGNOSIS — I73.9 PERIPHERAL VASCULAR DISEASE (HCC): ICD-10-CM

## 2023-08-21 DIAGNOSIS — F32.1 MODERATE MAJOR DEPRESSION (HCC): ICD-10-CM

## 2023-08-21 PROCEDURE — 99214 OFFICE O/P EST MOD 30 MIN: CPT | Performed by: FAMILY MEDICINE

## 2023-08-21 PROCEDURE — 3079F DIAST BP 80-89 MM HG: CPT | Performed by: FAMILY MEDICINE

## 2023-08-21 PROCEDURE — 3074F SYST BP LT 130 MM HG: CPT | Performed by: FAMILY MEDICINE

## 2023-08-21 PROCEDURE — 3051F HG A1C>EQUAL 7.0%<8.0%: CPT | Performed by: FAMILY MEDICINE

## 2023-08-21 RX ORDER — ALLOPURINOL 300 MG/1
300 TABLET ORAL DAILY
Qty: 90 TABLET | Refills: 3 | Status: SHIPPED | OUTPATIENT
Start: 2023-08-21

## 2023-08-21 RX ORDER — ALLOPURINOL 100 MG/1
100 TABLET ORAL DAILY
Qty: 90 TABLET | Refills: 3 | Status: SHIPPED | OUTPATIENT
Start: 2023-08-21

## 2023-08-21 RX ORDER — CHLORTHALIDONE 25 MG/1
25 TABLET ORAL DAILY
Qty: 90 TABLET | Refills: 1 | Status: SHIPPED | OUTPATIENT
Start: 2023-08-21 | End: 2024-08-20

## 2023-08-21 ASSESSMENT — PATIENT HEALTH QUESTIONNAIRE - PHQ9
SUM OF ALL RESPONSES TO PHQ QUESTIONS 1-9: 2
SUM OF ALL RESPONSES TO PHQ QUESTIONS 1-9: 2
1. LITTLE INTEREST OR PLEASURE IN DOING THINGS: 1
SUM OF ALL RESPONSES TO PHQ9 QUESTIONS 1 & 2: 2
2. FEELING DOWN, DEPRESSED OR HOPELESS: 1
SUM OF ALL RESPONSES TO PHQ QUESTIONS 1-9: 2
SUM OF ALL RESPONSES TO PHQ QUESTIONS 1-9: 2

## 2023-08-21 NOTE — PROGRESS NOTES
Chief Complaint   Patient presents with    Discuss Labs     Patient has not been out of the country in (14 months), NO diarrhea, NO cough, NO chest conjestion, NO temp. Pt has not been around anyone with these symptoms. Health Maintenance reviewed. I have reviewed the patient's medical history in detail and updated the computerized patient record. 1. \"Have you been to the ER, urgent care clinic since your last visit? No  Hospitalized since your last visit? \" no    2. \"Have you seen or consulted any other health care providers outside of the [address] [place] [address] since your last visit? \" no     3. For patients aged 43-73: Has the patient had a colonoscopy / FIT/ Cologuard?  no

## 2023-11-01 ENCOUNTER — TELEPHONE (OUTPATIENT)
Age: 53
End: 2023-11-01

## 2023-11-01 NOTE — TELEPHONE ENCOUNTER
Key: [facility] - PA Case ID: [id number] - Rx #: [id number] Approved PA Case: [id number], Status: Approved, Coverage Starts on: 11/1/2023 12:00:00 AM, Coverage Ends on: 10/31/2024 12:00:00 AM.[facility] pharmacy called,no answer.   Colchicine 0.6MG capsules  Form  [employer] Electronic PA Form (106 NCPDP)

## 2023-11-28 DIAGNOSIS — L03.211 CELLULITIS, FACE: ICD-10-CM

## 2024-01-05 DIAGNOSIS — I10 ESSENTIAL HYPERTENSION: ICD-10-CM

## 2024-01-11 RX ORDER — CHLORTHALIDONE 25 MG/1
25 TABLET ORAL DAILY
Qty: 90 TABLET | Refills: 0 | Status: SHIPPED | OUTPATIENT
Start: 2024-01-11

## 2024-01-11 RX ORDER — TRIAMCINOLONE ACETONIDE 1 MG/G
CREAM TOPICAL
Qty: 45 G | Refills: 0 | Status: SHIPPED | OUTPATIENT
Start: 2024-01-11

## 2024-01-19 DIAGNOSIS — L03.211 CELLULITIS, FACE: ICD-10-CM

## 2024-01-19 DIAGNOSIS — I10 ESSENTIAL HYPERTENSION: ICD-10-CM

## 2024-01-19 RX ORDER — LOSARTAN POTASSIUM 25 MG/1
25 TABLET ORAL DAILY
Qty: 30 TABLET | Refills: 0 | Status: SHIPPED | OUTPATIENT
Start: 2024-01-19

## 2024-01-19 RX ORDER — HYDROGEN PEROXIDE 2.65 ML/100ML
81 LIQUID ORAL; TOPICAL DAILY
Qty: 30 TABLET | Refills: 0 | Status: SHIPPED | OUTPATIENT
Start: 2024-01-19

## 2024-01-25 ENCOUNTER — TELEPHONE (OUTPATIENT)
Facility: CLINIC | Age: 54
End: 2024-01-25

## 2024-01-25 NOTE — TELEPHONE ENCOUNTER
Insurance has denied MRI of L shoulder, [facility] Dr Mosqueda, another clinician or nurse needs to do a peer to peer review.     Please call [phone] case #[id number]

## 2024-01-31 LAB — HBA1C MFR BLD: 7.4 % (ref 4.8–5.6)

## 2024-02-09 ENCOUNTER — OFFICE VISIT (OUTPATIENT)
Facility: CLINIC | Age: 54
End: 2024-02-09
Payer: MEDICAID

## 2024-02-09 VITALS
BODY MASS INDEX: 33.6 KG/M2 | WEIGHT: 240 LBS | HEART RATE: 112 BPM | RESPIRATION RATE: 20 BRPM | TEMPERATURE: 98.4 F | DIASTOLIC BLOOD PRESSURE: 78 MMHG | OXYGEN SATURATION: 96 % | HEIGHT: 71 IN | SYSTOLIC BLOOD PRESSURE: 130 MMHG

## 2024-02-09 DIAGNOSIS — M10.00 IDIOPATHIC GOUT, UNSPECIFIED CHRONICITY, UNSPECIFIED SITE: ICD-10-CM

## 2024-02-09 DIAGNOSIS — E11.65 TYPE 2 DIABETES MELLITUS WITH HYPERGLYCEMIA, WITHOUT LONG-TERM CURRENT USE OF INSULIN (HCC): ICD-10-CM

## 2024-02-09 DIAGNOSIS — F32.1 MODERATE MAJOR DEPRESSION (HCC): ICD-10-CM

## 2024-02-09 DIAGNOSIS — I10 ESSENTIAL HYPERTENSION: Primary | ICD-10-CM

## 2024-02-09 DIAGNOSIS — I73.9 PERIPHERAL VASCULAR DISEASE (HCC): ICD-10-CM

## 2024-02-09 DIAGNOSIS — E78.00 HYPERCHOLESTEREMIA: ICD-10-CM

## 2024-02-09 PROCEDURE — 3075F SYST BP GE 130 - 139MM HG: CPT | Performed by: FAMILY MEDICINE

## 2024-02-09 PROCEDURE — 3051F HG A1C>EQUAL 7.0%<8.0%: CPT | Performed by: FAMILY MEDICINE

## 2024-02-09 PROCEDURE — 99214 OFFICE O/P EST MOD 30 MIN: CPT | Performed by: FAMILY MEDICINE

## 2024-02-09 PROCEDURE — 3078F DIAST BP <80 MM HG: CPT | Performed by: FAMILY MEDICINE

## 2024-02-09 RX ORDER — POTASSIUM CHLORIDE 750 MG/1
10 TABLET, EXTENDED RELEASE ORAL DAILY
Qty: 90 TABLET | Refills: 1 | Status: SHIPPED | OUTPATIENT
Start: 2024-02-09

## 2024-02-09 RX ORDER — POTASSIUM CHLORIDE 750 MG/1
10 TABLET, EXTENDED RELEASE ORAL DAILY
COMMUNITY
Start: 2024-01-22 | End: 2024-02-09 | Stop reason: SDUPTHER

## 2024-02-09 RX ORDER — MULTIVITAMIN WITH IRON
250 TABLET ORAL DAILY
COMMUNITY
End: 2024-02-09 | Stop reason: SDUPTHER

## 2024-02-09 RX ORDER — MULTIVITAMIN WITH IRON
250 TABLET ORAL DAILY
Qty: 90 TABLET | Refills: 1 | Status: SHIPPED | OUTPATIENT
Start: 2024-02-09

## 2024-02-09 NOTE — PROGRESS NOTES
Chief Complaint   Patient presents with    Discuss Labs     Review results of labs       
1 tablet by mouth as needed      DULoxetine (CYMBALTA) 20 MG extended release capsule Take 1 capsule by mouth daily      haloperidol (HALDOL) 2 MG tablet Take 0.5 tablets by mouth daily       No current facility-administered medications for this visit.     3 mo

## 2024-02-21 DIAGNOSIS — I10 ESSENTIAL HYPERTENSION: ICD-10-CM

## 2024-02-21 DIAGNOSIS — L03.211 CELLULITIS, FACE: ICD-10-CM

## 2024-02-22 RX ORDER — TRIAMCINOLONE ACETONIDE 1 MG/G
CREAM TOPICAL
Qty: 45 G | Refills: 0 | Status: SHIPPED | OUTPATIENT
Start: 2024-02-22

## 2024-02-22 RX ORDER — LOSARTAN POTASSIUM 25 MG/1
25 TABLET ORAL DAILY
Qty: 30 TABLET | Refills: 3 | Status: SHIPPED | OUTPATIENT
Start: 2024-02-22

## 2024-03-13 DIAGNOSIS — L03.211 CELLULITIS, FACE: ICD-10-CM

## 2024-03-14 RX ORDER — TRIAMCINOLONE ACETONIDE 1 MG/G
CREAM TOPICAL
Qty: 45 G | Refills: 0 | Status: SHIPPED | OUTPATIENT
Start: 2024-03-14

## 2024-03-14 RX ORDER — HYDROGEN PEROXIDE 2.65 ML/100ML
81 LIQUID ORAL; TOPICAL DAILY
Qty: 30 TABLET | Refills: 0 | Status: SHIPPED | OUTPATIENT
Start: 2024-03-14

## 2024-03-20 RX ORDER — PREDNISONE 20 MG/1
TABLET ORAL
Qty: 10 TABLET | Refills: 0 | Status: SHIPPED | OUTPATIENT
Start: 2024-03-20

## 2024-03-26 ENCOUNTER — TELEPHONE (OUTPATIENT)
Facility: CLINIC | Age: 54
End: 2024-03-26

## 2024-04-04 ENCOUNTER — OFFICE VISIT (OUTPATIENT)
Facility: CLINIC | Age: 54
End: 2024-04-04
Payer: MEDICAID

## 2024-04-04 VITALS
RESPIRATION RATE: 16 BRPM | HEIGHT: 71 IN | DIASTOLIC BLOOD PRESSURE: 85 MMHG | OXYGEN SATURATION: 98 % | HEART RATE: 102 BPM | WEIGHT: 221 LBS | SYSTOLIC BLOOD PRESSURE: 115 MMHG | BODY MASS INDEX: 30.94 KG/M2 | TEMPERATURE: 98.1 F

## 2024-04-04 DIAGNOSIS — D17.1 LIPOMA OF TORSO: ICD-10-CM

## 2024-04-04 DIAGNOSIS — M17.11 ARTHRITIS OF RIGHT KNEE: ICD-10-CM

## 2024-04-04 DIAGNOSIS — E11.65 TYPE 2 DIABETES MELLITUS WITH HYPERGLYCEMIA, WITHOUT LONG-TERM CURRENT USE OF INSULIN (HCC): Primary | ICD-10-CM

## 2024-04-04 DIAGNOSIS — I10 ESSENTIAL HYPERTENSION: ICD-10-CM

## 2024-04-04 DIAGNOSIS — E78.00 HYPERCHOLESTEREMIA: ICD-10-CM

## 2024-04-04 PROCEDURE — 99214 OFFICE O/P EST MOD 30 MIN: CPT | Performed by: FAMILY MEDICINE

## 2024-04-04 PROCEDURE — 3051F HG A1C>EQUAL 7.0%<8.0%: CPT | Performed by: FAMILY MEDICINE

## 2024-04-04 PROCEDURE — 3079F DIAST BP 80-89 MM HG: CPT | Performed by: FAMILY MEDICINE

## 2024-04-04 PROCEDURE — 3074F SYST BP LT 130 MM HG: CPT | Performed by: FAMILY MEDICINE

## 2024-04-04 RX ORDER — MELOXICAM 15 MG/1
TABLET ORAL
COMMUNITY
Start: 2024-03-19

## 2024-04-04 ASSESSMENT — PATIENT HEALTH QUESTIONNAIRE - PHQ9
SUM OF ALL RESPONSES TO PHQ9 QUESTIONS 1 & 2: 2
2. FEELING DOWN, DEPRESSED OR HOPELESS: SEVERAL DAYS
SUM OF ALL RESPONSES TO PHQ QUESTIONS 1-9: 2
SUM OF ALL RESPONSES TO PHQ QUESTIONS 1-9: 2
1. LITTLE INTEREST OR PLEASURE IN DOING THINGS: SEVERAL DAYS
SUM OF ALL RESPONSES TO PHQ QUESTIONS 1-9: 2
SUM OF ALL RESPONSES TO PHQ QUESTIONS 1-9: 2

## 2024-04-04 NOTE — PROGRESS NOTES
Subjective:     Marco Oliver is a 53 y.o. male seen for follow-up of diabetes.     He has had hypoglycemic attacks. .no  Blood sugar control has been ok    Hemoglobin A1C   Date Value Ref Range Status   01/30/2024 7.4 (H) 4.8 - 5.6 % Final     Comment:                 Prediabetes: 5.7 - 6.4           Diabetes: >6.4           Glycemic control for adults with diabetes: <7.0       Lab Results   Component Value Date/Time     08/10/2023 10:42 AM    K 3.8 08/10/2023 10:42 AM     08/10/2023 10:42 AM    CO2 22 08/10/2023 10:42 AM    BUN 13 08/10/2023 10:42 AM    CREATININE 1.10 08/10/2023 10:42 AM    GLUCOSE 144 08/10/2023 10:42 AM    CALCIUM 8.9 08/10/2023 10:42 AM    LABGLOM 81 08/10/2023 10:42 AM          He has diabetes, hypertension, and hyperlipidemia.    Marco Oliver has the additional concern of went to ER for right knee pain, Dx with arthritis, ate pickles and shrimp.  ? Gout but pain is somewhat consistent    Reports taking blood pressure medications without side affects. No complaints of exertional chest pain, excessive shortness of breath or focal weakness. Minimal swelling in lower legs or dizziness with standing.      Diet and Lifestyle:  no rec drugs .    Patient Active Problem List    Diagnosis Date Noted    Hx of cardiac cath 06/01/2023    Abnormal cardiovascular stress test 11/03/2022    Type 2 diabetes mellitus with hyperglycemia, without long-term current use of insulin (HCC) 08/25/2021    Fatty liver 01/26/2021    Helicobacter pylori infection 04/15/2020    Peripheral vascular disease (HCC) 01/03/2020    Essential hypertension 10/01/2019    Moderate major depression (HCC) 10/01/2019    Hypercholesteremia 10/01/2019    Eczema 10/01/2019    Gout 10/01/2019     Allergies   Allergen Reactions    Valproic Acid Anxiety     Past Medical History:   Diagnosis Date    Depression     Hypercholesterolemia     Hyperglycemia due to type 2 diabetes mellitus (HCC) 8/25/2021    Hypertension     Psychotic

## 2024-04-05 LAB
BUN SERPL-MCNC: 10 MG/DL (ref 6–24)
BUN/CREAT SERPL: 11 (ref 9–20)
CALCIUM SERPL-MCNC: 9.8 MG/DL (ref 8.7–10.2)
CHLORIDE SERPL-SCNC: 99 MMOL/L (ref 96–106)
CHOLEST SERPL-MCNC: 156 MG/DL (ref 100–199)
CO2 SERPL-SCNC: 19 MMOL/L (ref 20–29)
CREAT SERPL-MCNC: 0.91 MG/DL (ref 0.76–1.27)
EGFRCR SERPLBLD CKD-EPI 2021: 101 ML/MIN/1.73
GLUCOSE SERPL-MCNC: 117 MG/DL (ref 70–99)
HBA1C MFR BLD: 7.6 % (ref 4.8–5.6)
HDLC SERPL-MCNC: 68 MG/DL
IMP & REVIEW OF LAB RESULTS: NORMAL
LDLC SERPL CALC-MCNC: 67 MG/DL (ref 0–99)
Lab: NORMAL
POTASSIUM SERPL-SCNC: 4.3 MMOL/L (ref 3.5–5.2)
SODIUM SERPL-SCNC: 138 MMOL/L (ref 134–144)
TRIGL SERPL-MCNC: 118 MG/DL (ref 0–149)
VLDLC SERPL CALC-MCNC: 21 MG/DL (ref 5–40)

## 2024-04-16 DIAGNOSIS — L03.211 CELLULITIS, FACE: ICD-10-CM

## 2024-04-17 DIAGNOSIS — E78.00 HYPERCHOLESTEREMIA: ICD-10-CM

## 2024-04-17 RX ORDER — PREDNISONE 20 MG/1
TABLET ORAL
Qty: 10 TABLET | Refills: 0 | Status: SHIPPED | OUTPATIENT
Start: 2024-04-17

## 2024-04-17 RX ORDER — TRIAMCINOLONE ACETONIDE 1 MG/G
CREAM TOPICAL
Qty: 45 G | Refills: 2 | Status: SHIPPED | OUTPATIENT
Start: 2024-04-17

## 2024-04-17 RX ORDER — HYDROGEN PEROXIDE 2.65 ML/100ML
81 LIQUID ORAL; TOPICAL DAILY
Qty: 30 TABLET | Refills: 11 | Status: SHIPPED | OUTPATIENT
Start: 2024-04-17

## 2024-04-19 RX ORDER — ROSUVASTATIN CALCIUM 40 MG/1
40 TABLET, COATED ORAL NIGHTLY
Qty: 90 TABLET | Refills: 0 | Status: SHIPPED | OUTPATIENT
Start: 2024-04-19

## 2024-05-28 DIAGNOSIS — L03.211 CELLULITIS, FACE: ICD-10-CM

## 2024-06-01 LAB
BUN SERPL-MCNC: 14 MG/DL (ref 6–24)
BUN/CREAT SERPL: 13 (ref 9–20)
CALCIUM SERPL-MCNC: 9.5 MG/DL (ref 8.7–10.2)
CHLORIDE SERPL-SCNC: 102 MMOL/L (ref 96–106)
CHOLEST SERPL-MCNC: 183 MG/DL (ref 100–199)
CO2 SERPL-SCNC: 21 MMOL/L (ref 20–29)
CREAT SERPL-MCNC: 1.07 MG/DL (ref 0.76–1.27)
EGFRCR SERPLBLD CKD-EPI 2021: 83 ML/MIN/1.73
GLUCOSE SERPL-MCNC: 123 MG/DL (ref 70–99)
HBA1C MFR BLD: 7 % (ref 4.8–5.6)
IMP & REVIEW OF LAB RESULTS: NORMAL
LDLC SERPL CALC-MCNC: 85 MG/DL (ref 0–99)
Lab: NORMAL
MAGNESIUM SERPL-MCNC: 1.9 MG/DL (ref 1.6–2.3)
POTASSIUM SERPL-SCNC: 4.3 MMOL/L (ref 3.5–5.2)
SODIUM SERPL-SCNC: 139 MMOL/L (ref 134–144)
TRIGL SERPL-MCNC: 207 MG/DL (ref 0–149)
URATE SERPL-MCNC: 10.1 MG/DL (ref 3.8–8.4)
VLDLC SERPL CALC-MCNC: 34 MG/DL (ref 5–40)

## 2024-06-02 NOTE — PROGRESS NOTES
Assessment/Plan:        Assessment & Plan  1. Gout.  The patient's diabetes is well-managed, as evidenced by his hemoglobin A1c level of 7.0 on 05/31/2024. However, his uric acid level is elevated at 10.1, which could potentially increase his susceptibility to gout. His cholesterol levels are satisfactory, with an LDL cholesterol level of 85. The dosage of allopurinol will be increased to 600 mg, while the 100 mg dosage will be discontinued. All of his medications will be refilled.    2. Suspected sleep apnea.  A sleep disorder symptom questionnaire has been completed. Flonase nasal spray has been prescribed. A referral to Dr. Tobar has been made.    Follow-up  A follow-up appointment is scheduled for 3 months from now.    Results  Laboratory Studies  Hemoglobin A1c was 7.0 on May 31, 2024. Uric acid level was 10.1. LDL cholesterol was 85.  1. Moderate major depression (HCC)  2. Peripheral vascular disease (HCC)  3. Type 2 diabetes mellitus with hyperglycemia, without long-term current use of insulin (HCC)  4. Essential hypertension  -     magnesium (MAGNESIUM-OXIDE) 250 MG TABS tablet; Take 1 tablet by mouth daily, Disp-90 tablet, R-2Normal  -     losartan (COZAAR) 25 MG tablet; Take 1 tablet by mouth daily, Disp-90 tablet, R-2Normal  -     amLODIPine (NORVASC) 5 MG tablet; Take 1 tablet by mouth daily, Disp-90 tablet, R-2Normal  5. Hypercholesteremia  -     rosuvastatin (CRESTOR) 40 MG tablet; Take 1 tablet by mouth nightly, Disp-90 tablet, R-2Normal  6. Idiopathic gout, unspecified chronicity, unspecified site  -     colchicine (COLCRYS) 0.6 MG tablet; Take 1 tablet by mouth as needed for Pain, Disp-30 tablet, R-5Normal  -     allopurinol (ZYLOPRIM) 300 MG tablet; Take 1 tablet by mouth 2 times daily, Disp-180 tablet, R-3Normal  7. Cellulitis, face  -     mupirocin (BACTROBAN) 2 % ointment; Apply topically 2 times daily Apply topically 3 times daily., Topical, 2 TIMES DAILY Starting Tue 6/4/2024, Disp-22

## 2024-06-04 ENCOUNTER — OFFICE VISIT (OUTPATIENT)
Facility: CLINIC | Age: 54
End: 2024-06-04
Payer: MEDICAID

## 2024-06-04 VITALS
TEMPERATURE: 98.1 F | OXYGEN SATURATION: 98 % | BODY MASS INDEX: 31.72 KG/M2 | HEIGHT: 71 IN | HEART RATE: 84 BPM | WEIGHT: 226.6 LBS | RESPIRATION RATE: 18 BRPM | SYSTOLIC BLOOD PRESSURE: 137 MMHG | DIASTOLIC BLOOD PRESSURE: 88 MMHG

## 2024-06-04 DIAGNOSIS — I10 ESSENTIAL HYPERTENSION: ICD-10-CM

## 2024-06-04 DIAGNOSIS — F32.1 MODERATE MAJOR DEPRESSION (HCC): Primary | ICD-10-CM

## 2024-06-04 DIAGNOSIS — E78.00 HYPERCHOLESTEREMIA: ICD-10-CM

## 2024-06-04 DIAGNOSIS — L03.211 CELLULITIS, FACE: ICD-10-CM

## 2024-06-04 DIAGNOSIS — R06.83 SNORING: ICD-10-CM

## 2024-06-04 DIAGNOSIS — I73.9 PERIPHERAL VASCULAR DISEASE (HCC): ICD-10-CM

## 2024-06-04 DIAGNOSIS — E11.65 TYPE 2 DIABETES MELLITUS WITH HYPERGLYCEMIA, WITHOUT LONG-TERM CURRENT USE OF INSULIN (HCC): ICD-10-CM

## 2024-06-04 DIAGNOSIS — R09.82 POST-NASAL DRIP: ICD-10-CM

## 2024-06-04 DIAGNOSIS — M10.00 IDIOPATHIC GOUT, UNSPECIFIED CHRONICITY, UNSPECIFIED SITE: ICD-10-CM

## 2024-06-04 PROCEDURE — 3079F DIAST BP 80-89 MM HG: CPT | Performed by: FAMILY MEDICINE

## 2024-06-04 PROCEDURE — 99214 OFFICE O/P EST MOD 30 MIN: CPT | Performed by: FAMILY MEDICINE

## 2024-06-04 PROCEDURE — 3051F HG A1C>EQUAL 7.0%<8.0%: CPT | Performed by: FAMILY MEDICINE

## 2024-06-04 PROCEDURE — 3075F SYST BP GE 130 - 139MM HG: CPT | Performed by: FAMILY MEDICINE

## 2024-06-04 RX ORDER — TRIAMCINOLONE ACETONIDE 1 MG/G
CREAM TOPICAL
Qty: 45 G | Refills: 5 | Status: SHIPPED | OUTPATIENT
Start: 2024-06-04

## 2024-06-04 RX ORDER — OMEPRAZOLE 40 MG/1
40 CAPSULE, DELAYED RELEASE ORAL DAILY
Qty: 90 CAPSULE | Refills: 2 | Status: SHIPPED | OUTPATIENT
Start: 2024-06-04

## 2024-06-04 RX ORDER — MULTIVITAMIN WITH IRON
250 TABLET ORAL DAILY
Qty: 90 TABLET | Refills: 2 | Status: SHIPPED | OUTPATIENT
Start: 2024-06-04

## 2024-06-04 RX ORDER — SPIRONOLACTONE 25 MG/1
25 TABLET ORAL DAILY
COMMUNITY

## 2024-06-04 RX ORDER — FLUTICASONE PROPIONATE 50 MCG
2 SPRAY, SUSPENSION (ML) NASAL DAILY
Qty: 16 G | Refills: 2 | Status: SHIPPED | OUTPATIENT
Start: 2024-06-04

## 2024-06-04 RX ORDER — ALLOPURINOL 300 MG/1
300 TABLET ORAL 2 TIMES DAILY
Qty: 180 TABLET | Refills: 3 | Status: SHIPPED | OUTPATIENT
Start: 2024-06-04

## 2024-06-04 RX ORDER — EZETIMIBE 10 MG/1
10 TABLET ORAL DAILY
COMMUNITY
End: 2024-06-04 | Stop reason: SDUPTHER

## 2024-06-04 RX ORDER — LOSARTAN POTASSIUM 25 MG/1
25 TABLET ORAL DAILY
Qty: 90 TABLET | Refills: 2 | Status: SHIPPED | OUTPATIENT
Start: 2024-06-04

## 2024-06-04 RX ORDER — ROSUVASTATIN CALCIUM 40 MG/1
40 TABLET, COATED ORAL NIGHTLY
Qty: 90 TABLET | Refills: 2 | Status: SHIPPED | OUTPATIENT
Start: 2024-06-04

## 2024-06-04 RX ORDER — AMLODIPINE BESYLATE 5 MG/1
5 TABLET ORAL DAILY
Qty: 90 TABLET | Refills: 2 | Status: SHIPPED | OUTPATIENT
Start: 2024-06-04

## 2024-06-04 RX ORDER — EZETIMIBE 10 MG/1
10 TABLET ORAL DAILY
Qty: 90 TABLET | Refills: 2 | Status: SHIPPED | OUTPATIENT
Start: 2024-06-04

## 2024-06-04 RX ORDER — POTASSIUM CHLORIDE 750 MG/1
10 TABLET, EXTENDED RELEASE ORAL DAILY
Qty: 90 TABLET | Refills: 2 | Status: SHIPPED | OUTPATIENT
Start: 2024-06-04 | End: 2024-06-04 | Stop reason: ALTCHOICE

## 2024-06-04 RX ORDER — COLCHICINE 0.6 MG/1
0.6 TABLET ORAL AS NEEDED
Qty: 30 TABLET | Refills: 5 | Status: SHIPPED | OUTPATIENT
Start: 2024-06-04

## 2024-06-04 SDOH — ECONOMIC STABILITY: HOUSING INSECURITY
IN THE LAST 12 MONTHS, WAS THERE A TIME WHEN YOU DID NOT HAVE A STEADY PLACE TO SLEEP OR SLEPT IN A SHELTER (INCLUDING NOW)?: PATIENT DECLINED

## 2024-06-04 SDOH — ECONOMIC STABILITY: FOOD INSECURITY: WITHIN THE PAST 12 MONTHS, THE FOOD YOU BOUGHT JUST DIDN'T LAST AND YOU DIDN'T HAVE MONEY TO GET MORE.: PATIENT DECLINED

## 2024-06-04 SDOH — ECONOMIC STABILITY: FOOD INSECURITY: WITHIN THE PAST 12 MONTHS, YOU WORRIED THAT YOUR FOOD WOULD RUN OUT BEFORE YOU GOT MONEY TO BUY MORE.: PATIENT DECLINED

## 2024-06-04 SDOH — ECONOMIC STABILITY: INCOME INSECURITY: HOW HARD IS IT FOR YOU TO PAY FOR THE VERY BASICS LIKE FOOD, HOUSING, MEDICAL CARE, AND HEATING?: PATIENT DECLINED

## 2024-06-04 NOTE — PROGRESS NOTES
Chief Complaint   Patient presents with    Discuss Labs         Health Maintenance Due   Topic Date Due    Hepatitis B vaccine (1 of 3 - 3-dose series) Never done    COVID-19 Vaccine (1) Never done    Pneumococcal 0-64 years Vaccine (1 of 2 - PCV) Never done    HIV screen  Never done    Diabetic retinal exam  Never done    DTaP/Tdap/Td vaccine (1 - Tdap) Never done    Shingles vaccine (1 of 2) Never done    Diabetic foot exam  08/23/2023         \"Have you been to the ER, urgent care clinic since your last visit?  Hospitalized since your last visit?\"    YES - When: approximately 1  weeks ago.  Where and Why: [facility].    “Have you seen or consulted any other health care providers outside of [facility] since your last visit?”    NO

## 2024-06-05 ENCOUNTER — TELEPHONE (OUTPATIENT)
Age: 54
End: 2024-06-05

## 2024-06-05 NOTE — TELEPHONE ENCOUNTER
Phoned the patient to schedule a sleep consult per     Hank Mosqueda MD.  VM full, unable to leave a message.

## 2024-08-19 RX ORDER — PREDNISONE 20 MG/1
TABLET ORAL
Qty: 10 TABLET | Refills: 0 | Status: SHIPPED | OUTPATIENT
Start: 2024-08-19

## 2024-08-19 NOTE — TELEPHONE ENCOUNTER
Requested Prescriptions     Pending Prescriptions Disp Refills    predniSONE (DELTASONE) 20 MG tablet [Pharmacy Med Name: predniSONE 20 MG Oral Tablet] 10 tablet 0     Sig: TAKE 2 TABLETS BY MOUTH ONCE DAILY WITH BREAKFAST FOR 5 DAYS     Last fill 4/17/2024 for #10 w/ no addtnl  Last OV 6/4/2024  Next OV 9/3/2024    Charley Kauffman LPN

## 2024-08-29 ENCOUNTER — TELEMEDICINE (OUTPATIENT)
Age: 54
End: 2024-08-29
Payer: MEDICAID

## 2024-08-29 DIAGNOSIS — G47.33 OBSTRUCTIVE SLEEP APNEA (ADULT) (PEDIATRIC): Primary | ICD-10-CM

## 2024-08-29 DIAGNOSIS — E11.9 TYPE 2 DIABETES MELLITUS WITHOUT COMPLICATION, WITHOUT LONG-TERM CURRENT USE OF INSULIN (HCC): ICD-10-CM

## 2024-08-29 DIAGNOSIS — I10 ESSENTIAL HYPERTENSION: ICD-10-CM

## 2024-08-29 PROCEDURE — 99204 OFFICE O/P NEW MOD 45 MIN: CPT | Performed by: INTERNAL MEDICINE

## 2024-08-29 PROCEDURE — 3051F HG A1C>EQUAL 7.0%<8.0%: CPT | Performed by: INTERNAL MEDICINE

## 2024-08-29 ASSESSMENT — SLEEP AND FATIGUE QUESTIONNAIRES
ESS TOTAL SCORE: 15
HOW LIKELY ARE YOU TO NOD OFF OR FALL ASLEEP IN A CAR, WHILE STOPPED FOR A FEW MINUTES IN TRAFFIC: WOULD NEVER DOZE
HOW LIKELY ARE YOU TO NOD OFF OR FALL ASLEEP WHILE SITTING INACTIVE IN A PUBLIC PLACE: MODERATE CHANCE OF DOZING
HOW LIKELY ARE YOU TO NOD OFF OR FALL ASLEEP WHILE SITTING AND READING: HIGH CHANCE OF DOZING
HOW LIKELY ARE YOU TO NOD OFF OR FALL ASLEEP WHILE SITTING QUIETLY AFTER LUNCH WITHOUT ALCOHOL: SLIGHT CHANCE OF DOZING
HOW LIKELY ARE YOU TO NOD OFF OR FALL ASLEEP WHEN YOU ARE A PASSENGER IN A CAR FOR AN HOUR WITHOUT A BREAK: HIGH CHANCE OF DOZING
HOW LIKELY ARE YOU TO NOD OFF OR FALL ASLEEP WHILE SITTING AND TALKING TO SOMEONE: WOULD NEVER DOZE
HOW LIKELY ARE YOU TO NOD OFF OR FALL ASLEEP WHILE LYING DOWN TO REST IN THE AFTERNOON WHEN CIRCUMSTANCES PERMIT: HIGH CHANCE OF DOZING
HOW LIKELY ARE YOU TO NOD OFF OR FALL ASLEEP WHILE WATCHING TV: HIGH CHANCE OF DOZING

## 2024-08-29 NOTE — PATIENT INSTRUCTIONS
[address]  [place] [address]  Tel.  [phone]  Fax. [phone] [address]  [place] [address]  Tel.  [phone]  Fax. [phone] [address] [place] [address]  Tel.  [phone]  Fax. [phone]     Sleep Apnea: After Your Visit  Your Care Instructions  Sleep apnea occurs when you frequently stop breathing for 10 seconds or longer during sleep. It can be mild to severe, based on the number of times per hour that you stop breathing or have slowed breathing. Blocked or narrowed airways in your nose, mouth, or throat can cause sleep apnea. Your airway can become blocked when your throat muscles and tongue relax during sleep.  Sleep apnea is common, occurring in 1 out of 20 individuals.  Individuals having any of the following characteristics should be evaluated and treated right away due to high risk and detrimental consequences from untreated sleep apnea:  Obesity  Congestive Heart failure  Atrial Fibrillation  Uncontrolled Hypertension  Type II Diabetes  Night-time Arrhythmias  Stroke  Pulmonary Hypertension  High-risk Driving Populations (pilots, truck drivers, etc.)  Patients Considering Weight-loss Surgery    How do you know you have sleep apnea?  You probably have sleep apnea if you answer 'yes' to 3 or more of the following questions:  S - Have you been told that you Snore?   T - Are you often Tired during the day?  O - Has anyone Observed you stop breathing while sleeping?  P- Do you have (or are being treated for) high blood Pressure?    B - Are you obese (Body Mass Index > 35)?  A - Is your Age 50 years old or older?  N - Is your Neck size greater than 16 inches?  G - Are you male Gender?  A sleep physician can prescribe a breathing device that prevents tissues in the throat from blocking your airway. Or your doctor may recommend using a dental device (oral breathing device) to help keep your airway open. In some cases, surgery may  label. Alcohol naturally makes you sleepy and on its own can cause accidents. Combined with excessive drowsiness its effects are amplified.   Signs of Drowsy Driving:   * You don't remember driving the last few miles   * You may drift out of your kesha   * You are unable to focus and your thoughts wander   * You may yawn more often than normal   * You have difficulty keeping your eyes open / nodding off   * Missing traffic signs, speeding, or tailgating  Prevention-   Good sleep hygiene, lifestyle and behavioral choices have the most impact on drowsy driving. There is no substitute for sleep and the average person requires 8 hours nightly. If you find yourself driving drowsy, stop and sleep. Consider the sleep hygiene tips provided during your visit as well.     Medication Refill Policy: Refills for all medications require 1 week advance notice. Please have your pharmacy fax a refill request. We are unable to fax, or call in \"controled substance\" medications and you will need to pick these prescriptions up from our office.

## 2024-08-29 NOTE — PROGRESS NOTES
Marco Oliver, was evaluated through a synchronous (real-time) audio-video encounter. The patient (or guardian if applicable) is aware that this is a billable service, which includes applicable co-pays. This Virtual Visit was conducted with patient's (and/or legal guardian's) consent. Patient identification was verified, and a caregiver was present when appropriate.   The patient was located at Home: [facility] [address]  [facility] [address]  Provider was located at Home (Appt Dept State): [place]  Confirm you are appropriately licensed, registered, or certified to deliver care in the state where the patient is located as indicated above. If you are not or unsure, please re-schedule the visit: Yes, I confirm.      Total time spent for this encounter: Not billed by time    --Alma Monet MD on 8/29/2024 at 3:56 PM    An electronic signature was used to authenticate this note.         Patient called and identity confirmed with 2 patient identifers     Marco Oliver is a 53 y.o. male who was seen by synchronous (real-time) audio-video technology on 8/29/2024.           --Alma Monet MD on 8/29/2024 at 3:56 PM                                 [address]  [place] [address]  Tel.  [phone]  Fax. [phone] [address]  [place] [address]  Tel.  [phone]  Fax. [phone] [address] [place] [address]  Tel.  [phone]  Fax. [phone]         Subjective:             Marco Oliver is an 53 y.o. male self-referred for evaluation for a sleep disorder.       He complains of snoring, snorting, choking, periods of not breathing associated with excessive daytime sleepiness, frequent night time awakenings.  Symptoms began several years ago, gradually worsening since that time. He usually can fall asleep in hours.  Family or house members note snoring, periods of not breathing. He denies falling asleep while driving.  .  Marco Oliver does wake up frequently at night. He is bothered by  MD  Diplomate in Sleep Medicine  [employer]    (Parts of this dictation were completed with voice recognition software. Quite often unanticipated grammatical, syntax, homophones, and other interpretive errors are inadvertently transcribed by the computer software.Please excuse any errors that have escaped final proofreading.)

## 2024-09-01 NOTE — PROGRESS NOTES
Assessment/Plan:        Assessment & Plan  1. Hypertension.  Blood pressure readings today were slightly low at 100/64 mmHg. He is not experiencing dizziness or side effects from his medications. It was recommended to discontinue spironolactone due to its potential to exacerbate gout symptoms and contribute to low blood pressure. He can resume it as needed for swelling.     2. Hyperlipidemia.  No specific concerns were discussed during this visit. Continue current management.    3. Gout.  He reported arthritis in his feet a couple of weeks ago, with pain on the top of his foot. He is currently taking allopurinol once a day, although it was prescribed twice daily. He was instructed to maintain his allopurinol regimen of at least once daily. A refill of colchicine was provided to manage acute gout flares. Prednisone was previously taken for arthritis and was effective; a refill was requested during this visit.    4. Anxiety.  He mentioned that his psychiatrist prescribed hydroxyzine, which he finds strong but helpful for sleep. He was advised to take it as needed.    5. Abdominal pain.  He experienced pain in the top of his stomach a few weeks ago and was treated at the ER with milk of magnesia, which resolved the issue. No current abdominal pain was reported.    6. Health Maintenance.  He was offered a flu shot and a pneumonia shot but declined both. He was reminded to complete blood work before his next visit.    Follow-up  A follow-up visit is scheduled in 3 months.    Results  Laboratory Studies  Uric acid level was high. Hemoglobin A1c was 7.0.  1. Type 2 diabetes mellitus with hyperglycemia, without long-term current use of insulin (HCC)  -      DIABETES FOOT EXAM  -     Hemoglobin A1C; Future  2. Moderate major depression (HCC)  3. Peripheral vascular disease (HCC)  4. Essential hypertension  -     Basic Metabolic Panel; Future  5. Hypercholesteremia  6. Drug-induced gout of foot, unspecified

## 2024-09-03 ENCOUNTER — OFFICE VISIT (OUTPATIENT)
Facility: CLINIC | Age: 54
End: 2024-09-03
Payer: MEDICAID

## 2024-09-03 VITALS
DIASTOLIC BLOOD PRESSURE: 69 MMHG | OXYGEN SATURATION: 97 % | TEMPERATURE: 97.8 F | RESPIRATION RATE: 16 BRPM | HEIGHT: 71 IN | BODY MASS INDEX: 32.34 KG/M2 | HEART RATE: 90 BPM | WEIGHT: 231 LBS | SYSTOLIC BLOOD PRESSURE: 94 MMHG

## 2024-09-03 DIAGNOSIS — M10.279 DRUG-INDUCED GOUT OF FOOT, UNSPECIFIED CHRONICITY, UNSPECIFIED LATERALITY: ICD-10-CM

## 2024-09-03 DIAGNOSIS — I10 ESSENTIAL HYPERTENSION: ICD-10-CM

## 2024-09-03 DIAGNOSIS — E11.65 TYPE 2 DIABETES MELLITUS WITH HYPERGLYCEMIA, WITHOUT LONG-TERM CURRENT USE OF INSULIN (HCC): Primary | ICD-10-CM

## 2024-09-03 DIAGNOSIS — I73.9 PERIPHERAL VASCULAR DISEASE (HCC): ICD-10-CM

## 2024-09-03 DIAGNOSIS — R60.9 EDEMA, UNSPECIFIED TYPE: ICD-10-CM

## 2024-09-03 DIAGNOSIS — E78.00 HYPERCHOLESTEREMIA: ICD-10-CM

## 2024-09-03 DIAGNOSIS — F32.1 MODERATE MAJOR DEPRESSION (HCC): ICD-10-CM

## 2024-09-03 PROCEDURE — 99214 OFFICE O/P EST MOD 30 MIN: CPT | Performed by: FAMILY MEDICINE

## 2024-09-03 PROCEDURE — 3074F SYST BP LT 130 MM HG: CPT | Performed by: FAMILY MEDICINE

## 2024-09-03 PROCEDURE — 3078F DIAST BP <80 MM HG: CPT | Performed by: FAMILY MEDICINE

## 2024-09-03 PROCEDURE — 3051F HG A1C>EQUAL 7.0%<8.0%: CPT | Performed by: FAMILY MEDICINE

## 2024-09-03 RX ORDER — HYDROXYZINE HYDROCHLORIDE 50 MG/1
25 TABLET, FILM COATED ORAL EVERY 8 HOURS PRN
COMMUNITY
Start: 2024-08-05

## 2024-09-03 RX ORDER — SPIRONOLACTONE 25 MG/1
25 TABLET ORAL DAILY PRN
Qty: 30 TABLET | Refills: 2 | Status: SHIPPED | OUTPATIENT
Start: 2024-09-03

## 2024-09-03 RX ORDER — PREDNISONE 20 MG/1
20 TABLET ORAL DAILY PRN
Qty: 10 TABLET | Refills: 1 | Status: SHIPPED | OUTPATIENT
Start: 2024-09-03

## 2024-09-03 ASSESSMENT — PATIENT HEALTH QUESTIONNAIRE - PHQ9
SUM OF ALL RESPONSES TO PHQ QUESTIONS 1-9: 2
1. LITTLE INTEREST OR PLEASURE IN DOING THINGS: SEVERAL DAYS
SUM OF ALL RESPONSES TO PHQ QUESTIONS 1-9: 2
SUM OF ALL RESPONSES TO PHQ QUESTIONS 1-9: 2
2. FEELING DOWN, DEPRESSED OR HOPELESS: SEVERAL DAYS
SUM OF ALL RESPONSES TO PHQ9 QUESTIONS 1 & 2: 2
SUM OF ALL RESPONSES TO PHQ QUESTIONS 1-9: 2

## 2024-09-03 NOTE — PROGRESS NOTES
Chief Complaint   Patient presents with    Diabetes     4 mth FU     Patient has not been out of the country in (14 months), NO diarrhea, NO cough, NO chest conjestion, NO temp.  Pt has not been around anyone with these symptoms.     Health Maintenance reviewed.    I have reviewed the patient's medical history in detail and updated the computerized patient record.    \"Have you been to the ER, urgent care clinic since your last visit? No  Hospitalized since your last visit?\"    no    “Have you seen or consulted any other health care providers outside of [facility] since your last visit?”    no

## 2024-09-12 ENCOUNTER — OFFICE VISIT (OUTPATIENT)
Facility: CLINIC | Age: 54
End: 2024-09-12
Payer: MEDICAID

## 2024-09-12 VITALS
SYSTOLIC BLOOD PRESSURE: 110 MMHG | HEART RATE: 92 BPM | HEIGHT: 71 IN | WEIGHT: 232 LBS | TEMPERATURE: 98.1 F | BODY MASS INDEX: 32.48 KG/M2 | RESPIRATION RATE: 16 BRPM | DIASTOLIC BLOOD PRESSURE: 72 MMHG | OXYGEN SATURATION: 97 %

## 2024-09-12 DIAGNOSIS — E11.65 TYPE 2 DIABETES MELLITUS WITH HYPERGLYCEMIA, WITHOUT LONG-TERM CURRENT USE OF INSULIN (HCC): ICD-10-CM

## 2024-09-12 DIAGNOSIS — M19.90 ARTHRITIS: ICD-10-CM

## 2024-09-12 DIAGNOSIS — I10 ESSENTIAL HYPERTENSION: ICD-10-CM

## 2024-09-12 DIAGNOSIS — N63.10 MASS OF RIGHT BREAST, UNSPECIFIED QUADRANT: Primary | ICD-10-CM

## 2024-09-12 PROCEDURE — 3051F HG A1C>EQUAL 7.0%<8.0%: CPT | Performed by: FAMILY MEDICINE

## 2024-09-12 PROCEDURE — 3078F DIAST BP <80 MM HG: CPT | Performed by: FAMILY MEDICINE

## 2024-09-12 PROCEDURE — 99214 OFFICE O/P EST MOD 30 MIN: CPT | Performed by: FAMILY MEDICINE

## 2024-09-12 PROCEDURE — 3074F SYST BP LT 130 MM HG: CPT | Performed by: FAMILY MEDICINE

## 2024-09-12 ASSESSMENT — PATIENT HEALTH QUESTIONNAIRE - PHQ9
1. LITTLE INTEREST OR PLEASURE IN DOING THINGS: SEVERAL DAYS
SUM OF ALL RESPONSES TO PHQ QUESTIONS 1-9: 2
SUM OF ALL RESPONSES TO PHQ9 QUESTIONS 1 & 2: 2
SUM OF ALL RESPONSES TO PHQ QUESTIONS 1-9: 2
SUM OF ALL RESPONSES TO PHQ QUESTIONS 1-9: 2
2. FEELING DOWN, DEPRESSED OR HOPELESS: SEVERAL DAYS
SUM OF ALL RESPONSES TO PHQ QUESTIONS 1-9: 2

## 2024-10-04 ENCOUNTER — TELEPHONE (OUTPATIENT)
Facility: CLINIC | Age: 54
End: 2024-10-04

## 2024-10-04 DIAGNOSIS — R92.8 ABNORMALITY OF RIGHT BREAST ON SCREENING MAMMOGRAM: Primary | ICD-10-CM

## 2024-10-04 NOTE — TELEPHONE ENCOUNTER
Pt needs an order for US of both breasts and a mammogram for R since a mammogram has never been performed on this pt. Per wife, [facility] won't do this and insurance won't cover unless an US is performed before mammogram

## 2024-10-07 ENCOUNTER — TELEPHONE (OUTPATIENT)
Age: 54
End: 2024-10-07

## 2024-10-07 NOTE — TELEPHONE ENCOUNTER
React form had been sent back on 08/30/2024 for patient.  Spoke with [employer] who stated patient was already scheduled for delivery.  Patient indicated no knowledge of this.  [employer] will reach out to patient to coordinate.    Informed Patient's wife (on PHI) as he was unavailable.

## 2024-10-08 NOTE — TELEPHONE ENCOUNTER
Please call [facility] at [place] to verify orders. Pt and wife are getting very frustrated that these orders have not been right since his visit on 9/12/2024

## 2024-10-12 DIAGNOSIS — M10.00 IDIOPATHIC GOUT, UNSPECIFIED CHRONICITY, UNSPECIFIED SITE: ICD-10-CM

## 2024-10-14 RX ORDER — ALLOPURINOL 300 MG/1
300 TABLET ORAL DAILY
Qty: 30 TABLET | Refills: 5 | Status: SHIPPED | OUTPATIENT
Start: 2024-10-14

## 2024-10-28 DIAGNOSIS — N63.10 MASS OF RIGHT BREAST, UNSPECIFIED QUADRANT: ICD-10-CM

## 2024-10-30 DIAGNOSIS — R09.82 POST-NASAL DRIP: ICD-10-CM

## 2024-10-30 DIAGNOSIS — M10.00 IDIOPATHIC GOUT, UNSPECIFIED CHRONICITY, UNSPECIFIED SITE: ICD-10-CM

## 2024-10-30 DIAGNOSIS — L03.211 CELLULITIS, FACE: ICD-10-CM

## 2024-10-31 RX ORDER — FLUTICASONE PROPIONATE 50 MCG
SPRAY, SUSPENSION (ML) NASAL
Qty: 16 G | Refills: 5 | Status: SHIPPED | OUTPATIENT
Start: 2024-10-31

## 2024-10-31 RX ORDER — MUPIROCIN 20 MG/G
OINTMENT TOPICAL
Qty: 22 G | Refills: 1 | Status: SHIPPED | OUTPATIENT
Start: 2024-10-31

## 2024-10-31 RX ORDER — ALLOPURINOL 100 MG/1
100 TABLET ORAL DAILY
Qty: 100 TABLET | Refills: 1 | Status: SHIPPED | OUTPATIENT
Start: 2024-10-31

## 2024-11-07 ENCOUNTER — TELEPHONE (OUTPATIENT)
Age: 54
End: 2024-11-07

## 2024-11-08 NOTE — TELEPHONE ENCOUNTER
Francisca report in chart    Lead tech to convey results to patient    The home test report was received and reviewed. It was positive for significant apnea.  AHI was 12/hour.  Mild oxygen desaturations and significant snoring.         Along with weight loss, treatment options include      CPAP would take care of all apneas and snoring regardless of position of sleep. If he  would like to use this at night when sleeping , I will order it  and see him after he  has used it a month or so. CPAP is the gold standard for treatment of sleep apnea.   Mouthpiece/dental device-can reduce apneas but they do not work well if sleeping on back so the positioning device is needed with this option in addition to the mouthpiece. Generally, snoring will be reduced but not eliminated.   Avoiding all sleeping on back. I would recommend he  do this with assistance of a positioning device which looks like a big fannypack that is worn to bed to keep off back position (Slumber bump or sleep noodle). These can be purchased online or can be made by taking a fannypack and stuffing it with 2-3 tennis balls.

## 2024-11-13 ENCOUNTER — TELEPHONE (OUTPATIENT)
Age: 54
End: 2024-11-13

## 2024-11-17 NOTE — PROGRESS NOTES
mg dose. He last used colchicine about 2 weeks ago.    He is also on Crestor for cholesterol management and duloxetine. He has not taken haloperidol for a couple of weeks due to his chest symptoms.    Social History     Socioeconomic History    Marital status:      Spouse name: Not on file    Number of children: Not on file    Years of education: Not on file    Highest education level: Not on file   Occupational History    Not on file   Tobacco Use    Smoking status: Former     Current packs/day: 0.00     Average packs/day: 0.3 packs/day for 30.0 years (9.9 ttl pk-yrs)     Types: Cigarettes     Start date: 7/1/1991     Quit date: 7/1/2021     Years since quitting: 3.3    Smokeless tobacco: Never   Substance and Sexual Activity    Alcohol use: Not Currently     Alcohol/week: 1.0 standard drink of alcohol    Drug use: Never    Sexual activity: Not on file   Other Topics Concern    Not on file   Social History Narrative    Lives with wife, getting disability     Social Determinants of Health     Financial Resource Strain: Patient Declined (6/4/2024)    Overall Financial Resource Strain (CARDIA)     Difficulty of Paying Living Expenses: Patient declined   Food Insecurity: Patient Declined (6/4/2024)    Hunger Vital Sign     Worried About Running Out of Food in the Last Year: Patient declined     Ran Out of Food in the Last Year: Patient declined   Transportation Needs: Unknown (6/4/2024)    PRAPARE - Transportation     Lack of Transportation (Medical): Not on file     Lack of Transportation (Non-Medical): Patient declined   Physical Activity: Not on file   Stress: Not on file   Social Connections: Not on file   Intimate Partner Violence: Not on file   Housing Stability: Unknown (6/4/2024)    Housing Stability Vital Sign     Unable to Pay for Housing in the Last Year: Not on file     Number of Places Lived in the Last Year: Not on file     Unstable Housing in the Last Year: Patient declined         Objective:

## 2024-11-19 ENCOUNTER — OFFICE VISIT (OUTPATIENT)
Facility: CLINIC | Age: 54
End: 2024-11-19
Payer: MEDICAID

## 2024-11-19 VITALS
BODY MASS INDEX: 32.76 KG/M2 | DIASTOLIC BLOOD PRESSURE: 62 MMHG | TEMPERATURE: 98.3 F | WEIGHT: 234 LBS | HEIGHT: 71 IN | SYSTOLIC BLOOD PRESSURE: 106 MMHG | OXYGEN SATURATION: 98 % | RESPIRATION RATE: 16 BRPM | HEART RATE: 97 BPM

## 2024-11-19 DIAGNOSIS — E78.00 HYPERCHOLESTEREMIA: ICD-10-CM

## 2024-11-19 DIAGNOSIS — I10 ESSENTIAL HYPERTENSION: ICD-10-CM

## 2024-11-19 DIAGNOSIS — I73.9 PERIPHERAL VASCULAR DISEASE (HCC): ICD-10-CM

## 2024-11-19 DIAGNOSIS — M10.00 IDIOPATHIC GOUT, UNSPECIFIED CHRONICITY, UNSPECIFIED SITE: ICD-10-CM

## 2024-11-19 DIAGNOSIS — E11.65 TYPE 2 DIABETES MELLITUS WITH HYPERGLYCEMIA, WITHOUT LONG-TERM CURRENT USE OF INSULIN (HCC): ICD-10-CM

## 2024-11-19 DIAGNOSIS — F32.1 MODERATE MAJOR DEPRESSION (HCC): ICD-10-CM

## 2024-11-19 DIAGNOSIS — N62 GYNECOMASTIA, MALE: Primary | ICD-10-CM

## 2024-11-19 PROCEDURE — 99214 OFFICE O/P EST MOD 30 MIN: CPT | Performed by: FAMILY MEDICINE

## 2024-11-19 PROCEDURE — 3051F HG A1C>EQUAL 7.0%<8.0%: CPT | Performed by: FAMILY MEDICINE

## 2024-11-19 PROCEDURE — 3074F SYST BP LT 130 MM HG: CPT | Performed by: FAMILY MEDICINE

## 2024-11-19 PROCEDURE — 3078F DIAST BP <80 MM HG: CPT | Performed by: FAMILY MEDICINE

## 2024-11-19 RX ORDER — AMLODIPINE BESYLATE 5 MG/1
5 TABLET ORAL DAILY
Qty: 100 TABLET | Refills: 2 | Status: SHIPPED | OUTPATIENT
Start: 2024-11-19

## 2024-11-19 RX ORDER — ALLOPURINOL 300 MG/1
300 TABLET ORAL DAILY
Qty: 100 TABLET | Refills: 2 | Status: SHIPPED | OUTPATIENT
Start: 2024-11-19

## 2024-11-26 ENCOUNTER — CLINICAL DOCUMENTATION (OUTPATIENT)
Age: 54
End: 2024-11-26

## 2024-12-05 LAB
BUN SERPL-MCNC: 14 MG/DL (ref 6–24)
BUN/CREAT SERPL: 14 (ref 9–20)
CALCIUM SERPL-MCNC: 9.7 MG/DL (ref 8.7–10.2)
CHLORIDE SERPL-SCNC: 103 MMOL/L (ref 96–106)
CO2 SERPL-SCNC: 23 MMOL/L (ref 20–29)
CREAT SERPL-MCNC: 1.01 MG/DL (ref 0.76–1.27)
EGFRCR SERPLBLD CKD-EPI 2021: 89 ML/MIN/1.73
GLUCOSE SERPL-MCNC: 123 MG/DL (ref 70–99)
HBA1C MFR BLD: 6.8 % (ref 4.8–5.6)
POTASSIUM SERPL-SCNC: 4.5 MMOL/L (ref 3.5–5.2)
SODIUM SERPL-SCNC: 141 MMOL/L (ref 134–144)
URATE SERPL-MCNC: 6.6 MG/DL (ref 3.8–8.4)

## 2024-12-09 ENCOUNTER — OFFICE VISIT (OUTPATIENT)
Facility: CLINIC | Age: 54
End: 2024-12-09
Payer: MEDICAID

## 2024-12-09 VITALS
WEIGHT: 239 LBS | HEIGHT: 71 IN | HEART RATE: 101 BPM | TEMPERATURE: 97.9 F | OXYGEN SATURATION: 95 % | RESPIRATION RATE: 16 BRPM | DIASTOLIC BLOOD PRESSURE: 77 MMHG | BODY MASS INDEX: 33.46 KG/M2 | SYSTOLIC BLOOD PRESSURE: 114 MMHG

## 2024-12-09 DIAGNOSIS — E78.00 HYPERCHOLESTEREMIA: ICD-10-CM

## 2024-12-09 DIAGNOSIS — F32.1 MODERATE MAJOR DEPRESSION (HCC): ICD-10-CM

## 2024-12-09 DIAGNOSIS — E11.65 TYPE 2 DIABETES MELLITUS WITH HYPERGLYCEMIA, WITHOUT LONG-TERM CURRENT USE OF INSULIN (HCC): ICD-10-CM

## 2024-12-09 DIAGNOSIS — I73.9 PERIPHERAL VASCULAR DISEASE (HCC): ICD-10-CM

## 2024-12-09 DIAGNOSIS — I10 ESSENTIAL HYPERTENSION: Primary | ICD-10-CM

## 2024-12-09 DIAGNOSIS — K21.9 GASTROESOPHAGEAL REFLUX DISEASE, UNSPECIFIED WHETHER ESOPHAGITIS PRESENT: ICD-10-CM

## 2024-12-09 DIAGNOSIS — M10.00 IDIOPATHIC GOUT, UNSPECIFIED CHRONICITY, UNSPECIFIED SITE: ICD-10-CM

## 2024-12-09 DIAGNOSIS — T50.905A DRUG-INDUCED GYNECOMASTIA: ICD-10-CM

## 2024-12-09 DIAGNOSIS — N62 DRUG-INDUCED GYNECOMASTIA: ICD-10-CM

## 2024-12-09 PROCEDURE — 3044F HG A1C LEVEL LT 7.0%: CPT | Performed by: FAMILY MEDICINE

## 2024-12-09 PROCEDURE — 3078F DIAST BP <80 MM HG: CPT | Performed by: FAMILY MEDICINE

## 2024-12-09 PROCEDURE — 99214 OFFICE O/P EST MOD 30 MIN: CPT | Performed by: FAMILY MEDICINE

## 2024-12-09 PROCEDURE — 3074F SYST BP LT 130 MM HG: CPT | Performed by: FAMILY MEDICINE

## 2024-12-09 RX ORDER — ROSUVASTATIN CALCIUM 40 MG/1
40 TABLET, COATED ORAL NIGHTLY
Qty: 100 TABLET | Refills: 2 | Status: SHIPPED | OUTPATIENT
Start: 2024-12-09

## 2024-12-09 RX ORDER — EZETIMIBE 10 MG/1
10 TABLET ORAL DAILY
Qty: 100 TABLET | Refills: 2 | Status: SHIPPED | OUTPATIENT
Start: 2024-12-09

## 2024-12-09 RX ORDER — OMEPRAZOLE 40 MG/1
40 CAPSULE, DELAYED RELEASE ORAL DAILY
Qty: 100 CAPSULE | Refills: 2 | Status: SHIPPED | OUTPATIENT
Start: 2024-12-09

## 2024-12-09 RX ORDER — NAPROXEN 500 MG/1
500 TABLET ORAL 2 TIMES DAILY WITH MEALS
Qty: 60 TABLET | Refills: 1 | Status: SHIPPED | OUTPATIENT
Start: 2024-12-09

## 2024-12-09 RX ORDER — COLCHICINE 0.6 MG/1
0.6 TABLET ORAL AS NEEDED
Qty: 30 TABLET | Refills: 5 | Status: SHIPPED | OUTPATIENT
Start: 2024-12-09

## 2024-12-09 RX ORDER — TAMOXIFEN CITRATE 20 MG/1
20 TABLET ORAL DAILY
Qty: 30 TABLET | Refills: 2 | Status: SHIPPED | OUTPATIENT
Start: 2024-12-09

## 2024-12-09 ASSESSMENT — PATIENT HEALTH QUESTIONNAIRE - PHQ9
SUM OF ALL RESPONSES TO PHQ QUESTIONS 1-9: 2
SUM OF ALL RESPONSES TO PHQ QUESTIONS 1-9: 2
SUM OF ALL RESPONSES TO PHQ9 QUESTIONS 1 & 2: 2
SUM OF ALL RESPONSES TO PHQ QUESTIONS 1-9: 2
1. LITTLE INTEREST OR PLEASURE IN DOING THINGS: SEVERAL DAYS
SUM OF ALL RESPONSES TO PHQ QUESTIONS 1-9: 2
10. IF YOU CHECKED OFF ANY PROBLEMS, HOW DIFFICULT HAVE THESE PROBLEMS MADE IT FOR YOU TO DO YOUR WORK, TAKE CARE OF THINGS AT HOME, OR GET ALONG WITH OTHER PEOPLE: NOT DIFFICULT AT ALL
2. FEELING DOWN, DEPRESSED OR HOPELESS: SEVERAL DAYS

## 2024-12-09 NOTE — PROGRESS NOTES
Assessment/Plan:        Assessment & Plan  1. Gynecomastia.  He reports pain associated with gynecomastia, which developed after taking spironolactone. Naproxen will be prescribed for pain management. Additionally, tamoxifen will be prescribed to block estrogen and reduce breast tissue size. He is advised to take tamoxifen daily for 3 months. If there is no noticeable improvement after a month, he should call for a potential referral to a surgeon. The potential side effect of hot flashes was discussed.    2. Diabetes Mellitus.  He has early-stage diabetes, which could potentially impact his renal function. A urine test will be conducted to assess the impact of diabetes on his kidneys. He is advised to monitor his diet and continue losing weight through exercise.    3. Gout.  He experiences occasional gout flare-ups. He is advised to take allopurinol daily and colchicine as needed for gout flare-ups. Prednisone can be taken if gout flares up.    4. Hypertension.  His blood pressure is within normal range. He should continue monitoring his blood pressure regularly.    5. Arthritis.  He takes prednisone occasionally for arthritis. He should continue this regimen as needed.    6. Eczema.  He reports redness on his palms. He is instructed to apply triamcinolone cream on his hands.    7. Gastroesophageal Reflux Disease (GERD).  He experiences occasional heartburn and takes omeprazole as needed. His omeprazole prescription will be refilled.    8. Hyperlipidemia.  His cholesterol medication will be refilled.    Medication Management.  Refills for omeprazole and cholesterol medication will be provided. He gets his medications from [facility].    Follow-up  Return in 3 months for follow up.    Results    1. Essential hypertension  2. Hypercholesteremia  -     ezetimibe (ZETIA) 10 MG tablet; Take 1 tablet by mouth daily, Disp-100 tablet, R-2Normal  -     rosuvastatin (CRESTOR) 40 MG tablet; Take 1 tablet by mouth nightly,

## 2024-12-09 NOTE — PROGRESS NOTES
Chief Complaint   Patient presents with    Discuss Labs     Patient has not been out of the country in (14 months), NO diarrhea, NO cough, NO chest conjestion, NO temp.  Pt has not been around anyone with these symptoms.     Health Maintenance reviewed.    I have reviewed the patient's medical history in detail and updated the computerized patient record.    \"Have you been to the ER, urgent care clinic since your last visit? No  Hospitalized since your last visit?\"    no    “Have you seen or consulted any other health care providers outside of [facility] since your last visit?”    no

## 2024-12-10 LAB
ALBUMIN/CREAT UR: <2 MG/G CREAT (ref 0–29)
CREAT UR-MCNC: 153.8 MG/DL
MICROALBUMIN UR-MCNC: <3 UG/ML

## 2024-12-11 LAB — Lab: NORMAL

## 2024-12-19 ENCOUNTER — TELEPHONE (OUTPATIENT)
Age: 54
End: 2024-12-19

## 2024-12-20 DIAGNOSIS — M10.279 DRUG-INDUCED GOUT OF FOOT, UNSPECIFIED CHRONICITY, UNSPECIFIED LATERALITY: ICD-10-CM

## 2024-12-24 RX ORDER — PREDNISONE 20 MG/1
TABLET ORAL
Qty: 10 TABLET | Refills: 0 | Status: SHIPPED | OUTPATIENT
Start: 2024-12-24

## 2025-02-04 NOTE — PROGRESS NOTES
Assessment/Plan:        Assessment & Plan  1. Gynecomastia.  He has been on tamoxifen for 3 months with one more month to go. The gynecomastia is not very pronounced but is causing him distress. He is advised to continue tamoxifen for another month. If there is no improvement and he remains dissatisfied, a referral to a surgeon will be considered.    2. Diabetes Mellitus.  His diabetes is well-controlled, and he is not on any medications for it. He is advised to complete his tomoxifen course and undergo blood work before his next visit in March 2025.    3. Gout.  He experiences pain on the top of his foot, which may be due to gout. He is currently taking allopurinol daily and has been prescribed prednisone for flare-ups. A refill for prednisone has been provided. He is also advised to take naproxen as needed.    4. Depression.  He reports mood fluctuations and occasional depression, particularly related to his chest condition. He is currently on Cymbalta and haloperidol, which is causing him to feel like he is vibrating and cold. He is advised to discuss these side effects with his psychiatrist, who may consider switching him to Seroquel or another medication.    5. Vision issues.  Cannot afford glasses recommended by optometry.  He is advised to take his prescription over to [facility] and see whether it is cheaper.    Follow-up  The patient will follow up in April 2025.    Results    1. Type 2 diabetes mellitus with hyperglycemia, without long-term current use of insulin (HCC)  -      DIABETES FOOT EXAM  2. Moderate major depression (HCC)  3. Peripheral vascular disease (HCC)  4. Essential hypertension  5. Fatty liver  6. Hypercholesteremia  7. Drug-induced gout of foot, unspecified chronicity, unspecified laterality  -     predniSONE (DELTASONE) 20 MG tablet; Take 1 tablet by mouth daily As needed for gout, Disp-10 tablet, R-1Normal  8. Idiopathic chronic gout of right foot without tophus           Orders

## 2025-02-05 ENCOUNTER — OFFICE VISIT (OUTPATIENT)
Facility: CLINIC | Age: 55
End: 2025-02-05

## 2025-02-05 VITALS
OXYGEN SATURATION: 98 % | HEART RATE: 94 BPM | WEIGHT: 242.4 LBS | BODY MASS INDEX: 33.94 KG/M2 | DIASTOLIC BLOOD PRESSURE: 86 MMHG | TEMPERATURE: 98.4 F | HEIGHT: 71 IN | RESPIRATION RATE: 16 BRPM | SYSTOLIC BLOOD PRESSURE: 129 MMHG

## 2025-02-05 DIAGNOSIS — I10 ESSENTIAL HYPERTENSION: ICD-10-CM

## 2025-02-05 DIAGNOSIS — K76.0 FATTY LIVER: ICD-10-CM

## 2025-02-05 DIAGNOSIS — E78.00 HYPERCHOLESTEREMIA: ICD-10-CM

## 2025-02-05 DIAGNOSIS — M10.279 DRUG-INDUCED GOUT OF FOOT, UNSPECIFIED CHRONICITY, UNSPECIFIED LATERALITY: ICD-10-CM

## 2025-02-05 DIAGNOSIS — M1A.0710 IDIOPATHIC CHRONIC GOUT OF RIGHT FOOT WITHOUT TOPHUS: ICD-10-CM

## 2025-02-05 DIAGNOSIS — F32.1 MODERATE MAJOR DEPRESSION (HCC): ICD-10-CM

## 2025-02-05 DIAGNOSIS — E11.65 TYPE 2 DIABETES MELLITUS WITH HYPERGLYCEMIA, WITHOUT LONG-TERM CURRENT USE OF INSULIN (HCC): Primary | ICD-10-CM

## 2025-02-05 DIAGNOSIS — I73.9 PERIPHERAL VASCULAR DISEASE (HCC): ICD-10-CM

## 2025-02-05 RX ORDER — PREDNISONE 20 MG/1
20 TABLET ORAL DAILY
Qty: 10 TABLET | Refills: 1 | Status: SHIPPED | OUTPATIENT
Start: 2025-02-05

## 2025-02-05 SDOH — ECONOMIC STABILITY: TRANSPORTATION INSECURITY
IN THE PAST 12 MONTHS, HAS THE LACK OF TRANSPORTATION KEPT YOU FROM MEDICAL APPOINTMENTS OR FROM GETTING MEDICATIONS?: NO

## 2025-02-05 SDOH — ECONOMIC STABILITY: INCOME INSECURITY: IN THE LAST 12 MONTHS, WAS THERE A TIME WHEN YOU WERE NOT ABLE TO PAY THE MORTGAGE OR RENT ON TIME?: NO

## 2025-02-05 SDOH — ECONOMIC STABILITY: TRANSPORTATION INSECURITY
IN THE PAST 12 MONTHS, HAS LACK OF TRANSPORTATION KEPT YOU FROM MEETINGS, WORK, OR FROM GETTING THINGS NEEDED FOR DAILY LIVING?: NO

## 2025-02-05 SDOH — ECONOMIC STABILITY: FOOD INSECURITY: WITHIN THE PAST 12 MONTHS, YOU WORRIED THAT YOUR FOOD WOULD RUN OUT BEFORE YOU GOT MONEY TO BUY MORE.: SOMETIMES TRUE

## 2025-02-05 SDOH — ECONOMIC STABILITY: FOOD INSECURITY: WITHIN THE PAST 12 MONTHS, THE FOOD YOU BOUGHT JUST DIDN'T LAST AND YOU DIDN'T HAVE MONEY TO GET MORE.: SOMETIMES TRUE

## 2025-02-05 ASSESSMENT — PATIENT HEALTH QUESTIONNAIRE - PHQ9
6. FEELING BAD ABOUT YOURSELF - OR THAT YOU ARE A FAILURE OR HAVE LET YOURSELF OR YOUR FAMILY DOWN: SEVERAL DAYS
SUM OF ALL RESPONSES TO PHQ QUESTIONS 1-9: 13
5. POOR APPETITE OR OVEREATING: MORE THAN HALF THE DAYS
SUM OF ALL RESPONSES TO PHQ QUESTIONS 1-9: 13
SUM OF ALL RESPONSES TO PHQ QUESTIONS 1-9: 13
4. FEELING TIRED OR HAVING LITTLE ENERGY: SEVERAL DAYS
3. TROUBLE FALLING OR STAYING ASLEEP: SEVERAL DAYS
10. IF YOU CHECKED OFF ANY PROBLEMS, HOW DIFFICULT HAVE THESE PROBLEMS MADE IT FOR YOU TO DO YOUR WORK, TAKE CARE OF THINGS AT HOME, OR GET ALONG WITH OTHER PEOPLE: SOMEWHAT DIFFICULT
9. THOUGHTS THAT YOU WOULD BE BETTER OFF DEAD, OR OF HURTING YOURSELF: SEVERAL DAYS
7. TROUBLE CONCENTRATING ON THINGS, SUCH AS READING THE NEWSPAPER OR WATCHING TELEVISION: NEARLY EVERY DAY
SUM OF ALL RESPONSES TO PHQ9 QUESTIONS 1 & 2: 2
1. LITTLE INTEREST OR PLEASURE IN DOING THINGS: SEVERAL DAYS
8. MOVING OR SPEAKING SO SLOWLY THAT OTHER PEOPLE COULD HAVE NOTICED. OR THE OPPOSITE, BEING SO FIGETY OR RESTLESS THAT YOU HAVE BEEN MOVING AROUND A LOT MORE THAN USUAL: MORE THAN HALF THE DAYS
2. FEELING DOWN, DEPRESSED OR HOPELESS: SEVERAL DAYS
SUM OF ALL RESPONSES TO PHQ QUESTIONS 1-9: 12

## 2025-02-05 ASSESSMENT — COLUMBIA-SUICIDE SEVERITY RATING SCALE - C-SSRS
6. HAVE YOU EVER DONE ANYTHING, STARTED TO DO ANYTHING, OR PREPARED TO DO ANYTHING TO END YOUR LIFE?: NO
4. HAVE YOU HAD THESE THOUGHTS AND HAD SOME INTENTION OF ACTING ON THEM?: NO
5. HAVE YOU STARTED TO WORK OUT OR WORKED OUT THE DETAILS OF HOW TO KILL YOURSELF? DO YOU INTEND TO CARRY OUT THIS PLAN?: NO
3. HAVE YOU BEEN THINKING ABOUT HOW YOU MIGHT KILL YOURSELF?: YES
1. WITHIN THE PAST MONTH, HAVE YOU WISHED YOU WERE DEAD OR WISHED YOU COULD GO TO SLEEP AND NOT WAKE UP?: YES
2. HAVE YOU ACTUALLY HAD ANY THOUGHTS OF KILLING YOURSELF?: YES

## 2025-02-05 NOTE — PROGRESS NOTES
Health Maintenance Due   Topic Date Due    Pneumococcal 0-64 years Vaccine (1 of 2 - PCV) Never done    HIV screen  Never done    Diabetic retinal exam  Never done    Hepatitis B vaccine (1 of 3 - 19+ 3-dose series) Never done    DTaP/Tdap/Td vaccine (1 - Tdap) Never done    Shingles vaccine (1 of 2) Never done    COVID-19 Vaccine (1 - 2023-24 season) Never done     Dr. Mosquead notified of phq-9 results.

## 2025-04-05 LAB
BUN SERPL-MCNC: 11 MG/DL (ref 6–24)
BUN/CREAT SERPL: 10 (ref 9–20)
CALCIUM SERPL-MCNC: 9.2 MG/DL (ref 8.7–10.2)
CHLORIDE SERPL-SCNC: 101 MMOL/L (ref 96–106)
CO2 SERPL-SCNC: 18 MMOL/L (ref 20–29)
CREAT SERPL-MCNC: 1.06 MG/DL (ref 0.76–1.27)
EGFRCR SERPLBLD CKD-EPI 2021: 84 ML/MIN/1.73
GLUCOSE SERPL-MCNC: 136 MG/DL (ref 70–99)
HBA1C MFR BLD: 7.3 % (ref 4.8–5.6)
POTASSIUM SERPL-SCNC: 4.1 MMOL/L (ref 3.5–5.2)
SODIUM SERPL-SCNC: 137 MMOL/L (ref 134–144)
URATE SERPL-MCNC: 6.2 MG/DL (ref 3.8–8.4)

## 2025-04-06 NOTE — PROGRESS NOTES
Assessment/Plan:        Assessment & Plan  1. Health maintenance.  - Blood pressure is well controlled on current medication regimen.  - Advised to continue current antihypertensive medications.  - Standard blood work ordered before the next follow-up appointment.  - Follow-up scheduled in 4 months.    2. Gynecomastia.  - Has been on tamoxifen for 3 months with slight improvement reported.  - Will continue tamoxifen for another 3 months to complete a 6-month course.  - If symptoms persist, a referral to a breast surgeon will be considered.  - No significant side effects from tamoxifen reported, except for minor dry spots.    3. Gout.  - Uric acid level was 6.2 in 04/2025, indicating well-controlled gout.  - Advised to continue colchicine as needed for flare-ups.  - Referral to a podiatrist provided for further evaluation of the knot on his toe.  - Reports occasional use of prednisone and naproxen for arthritis flare-ups.    4. Diabetes Mellitus.  - A1c is 7.3, slightly elevated but still considered well-controlled.  - Not currently on any medication for diabetes.  - Advised to maintain a balanced diet and monitor blood sugar levels.  - Medication may be considered if the condition worsens.    5. Arthritis.  - Reports occasional use of prednisone and naproxen for arthritis flare-ups.  - Advised to continue these medications as needed.  - Follow-up with primary care physician for ongoing management recommended.    6. Sleep apnea.  - Appointment with Dr. Monet scheduled later this month for evaluation of sleep apnea.    Results  Labs   - Uric acid level: 04/2025, 6.2 mg/dL   - A1c: 04/2025, 7.3%      ICD-10-CM    1. Moderate major depression (HCC)  F32.1       2. Type 2 diabetes mellitus with hyperglycemia, without long-term current use of insulin (HCC)  E11.65       3. Essential hypertension  I10 amLODIPine (NORVASC) 5 MG tablet     aspirin (EQ ASPIRIN ADULT LOW DOSE) 81 MG EC tablet     magnesium

## 2025-04-07 ENCOUNTER — OFFICE VISIT (OUTPATIENT)
Facility: CLINIC | Age: 55
End: 2025-04-07
Payer: MEDICAID

## 2025-04-07 VITALS
HEIGHT: 71 IN | SYSTOLIC BLOOD PRESSURE: 126 MMHG | BODY MASS INDEX: 32.93 KG/M2 | OXYGEN SATURATION: 95 % | RESPIRATION RATE: 14 BRPM | HEART RATE: 101 BPM | DIASTOLIC BLOOD PRESSURE: 82 MMHG | WEIGHT: 235.2 LBS | TEMPERATURE: 98.2 F

## 2025-04-07 DIAGNOSIS — M1A.0710 IDIOPATHIC CHRONIC GOUT OF RIGHT FOOT WITHOUT TOPHUS: ICD-10-CM

## 2025-04-07 DIAGNOSIS — M10.00 IDIOPATHIC GOUT, UNSPECIFIED CHRONICITY, UNSPECIFIED SITE: ICD-10-CM

## 2025-04-07 DIAGNOSIS — T50.905A DRUG-INDUCED GYNECOMASTIA: ICD-10-CM

## 2025-04-07 DIAGNOSIS — E11.65 TYPE 2 DIABETES MELLITUS WITH HYPERGLYCEMIA, WITHOUT LONG-TERM CURRENT USE OF INSULIN (HCC): ICD-10-CM

## 2025-04-07 DIAGNOSIS — N62 DRUG-INDUCED GYNECOMASTIA: ICD-10-CM

## 2025-04-07 DIAGNOSIS — I10 ESSENTIAL HYPERTENSION: ICD-10-CM

## 2025-04-07 DIAGNOSIS — E78.00 HYPERCHOLESTEREMIA: ICD-10-CM

## 2025-04-07 DIAGNOSIS — R09.82 POST-NASAL DRIP: ICD-10-CM

## 2025-04-07 DIAGNOSIS — K21.9 GASTROESOPHAGEAL REFLUX DISEASE, UNSPECIFIED WHETHER ESOPHAGITIS PRESENT: ICD-10-CM

## 2025-04-07 DIAGNOSIS — M10.279 DRUG-INDUCED GOUT OF FOOT, UNSPECIFIED CHRONICITY, UNSPECIFIED LATERALITY: ICD-10-CM

## 2025-04-07 DIAGNOSIS — F32.1 MODERATE MAJOR DEPRESSION (HCC): Primary | ICD-10-CM

## 2025-04-07 PROCEDURE — 3079F DIAST BP 80-89 MM HG: CPT | Performed by: FAMILY MEDICINE

## 2025-04-07 PROCEDURE — 99214 OFFICE O/P EST MOD 30 MIN: CPT | Performed by: FAMILY MEDICINE

## 2025-04-07 PROCEDURE — 3051F HG A1C>EQUAL 7.0%<8.0%: CPT | Performed by: FAMILY MEDICINE

## 2025-04-07 PROCEDURE — 3074F SYST BP LT 130 MM HG: CPT | Performed by: FAMILY MEDICINE

## 2025-04-07 RX ORDER — FLUTICASONE PROPIONATE 50 MCG
1 SPRAY, SUSPENSION (ML) NASAL DAILY
Qty: 16 G | Refills: 5 | Status: SHIPPED | OUTPATIENT
Start: 2025-04-07

## 2025-04-07 RX ORDER — PREDNISONE 20 MG/1
20 TABLET ORAL DAILY
Qty: 10 TABLET | Refills: 1 | Status: SHIPPED | OUTPATIENT
Start: 2025-04-07

## 2025-04-07 RX ORDER — ALLOPURINOL 300 MG/1
300 TABLET ORAL DAILY
Qty: 100 TABLET | Refills: 2 | Status: SHIPPED | OUTPATIENT
Start: 2025-04-07

## 2025-04-07 RX ORDER — ROSUVASTATIN CALCIUM 40 MG/1
40 TABLET, COATED ORAL NIGHTLY
Qty: 100 TABLET | Refills: 2 | Status: SHIPPED | OUTPATIENT
Start: 2025-04-07

## 2025-04-07 RX ORDER — AMLODIPINE BESYLATE 5 MG/1
5 TABLET ORAL DAILY
Qty: 100 TABLET | Refills: 2 | Status: SHIPPED | OUTPATIENT
Start: 2025-04-07

## 2025-04-07 RX ORDER — EZETIMIBE 10 MG/1
10 TABLET ORAL DAILY
Qty: 100 TABLET | Refills: 2 | Status: SHIPPED | OUTPATIENT
Start: 2025-04-07

## 2025-04-07 RX ORDER — MULTIVITAMIN WITH IRON
250 TABLET ORAL DAILY
Qty: 100 TABLET | Refills: 2 | Status: SHIPPED | OUTPATIENT
Start: 2025-04-07

## 2025-04-07 RX ORDER — OMEPRAZOLE 40 MG/1
40 CAPSULE, DELAYED RELEASE ORAL DAILY
Qty: 100 CAPSULE | Refills: 2 | Status: SHIPPED | OUTPATIENT
Start: 2025-04-07

## 2025-04-07 RX ORDER — ASPIRIN 81 MG/1
81 TABLET ORAL DAILY
Qty: 100 TABLET | Refills: 2 | Status: SHIPPED | OUTPATIENT
Start: 2025-04-07

## 2025-04-07 RX ORDER — TAMOXIFEN CITRATE 20 MG/1
20 TABLET ORAL DAILY
Qty: 100 TABLET | Refills: 1 | Status: SHIPPED | OUTPATIENT
Start: 2025-04-07

## 2025-04-07 RX ORDER — COLCHICINE 0.6 MG/1
0.6 TABLET ORAL AS NEEDED
Qty: 100 TABLET | Refills: 2 | Status: SHIPPED | OUTPATIENT
Start: 2025-04-07

## 2025-04-07 SDOH — ECONOMIC STABILITY: FOOD INSECURITY: WITHIN THE PAST 12 MONTHS, THE FOOD YOU BOUGHT JUST DIDN'T LAST AND YOU DIDN'T HAVE MONEY TO GET MORE.: NEVER TRUE

## 2025-04-07 SDOH — ECONOMIC STABILITY: FOOD INSECURITY: WITHIN THE PAST 12 MONTHS, YOU WORRIED THAT YOUR FOOD WOULD RUN OUT BEFORE YOU GOT MONEY TO BUY MORE.: NEVER TRUE

## 2025-04-07 ASSESSMENT — PATIENT HEALTH QUESTIONNAIRE - PHQ9
10. IF YOU CHECKED OFF ANY PROBLEMS, HOW DIFFICULT HAVE THESE PROBLEMS MADE IT FOR YOU TO DO YOUR WORK, TAKE CARE OF THINGS AT HOME, OR GET ALONG WITH OTHER PEOPLE: VERY DIFFICULT
9. THOUGHTS THAT YOU WOULD BE BETTER OFF DEAD, OR OF HURTING YOURSELF: NOT AT ALL
7. TROUBLE CONCENTRATING ON THINGS, SUCH AS READING THE NEWSPAPER OR WATCHING TELEVISION: MORE THAN HALF THE DAYS
4. FEELING TIRED OR HAVING LITTLE ENERGY: NEARLY EVERY DAY
SUM OF ALL RESPONSES TO PHQ QUESTIONS 1-9: 8
6. FEELING BAD ABOUT YOURSELF - OR THAT YOU ARE A FAILURE OR HAVE LET YOURSELF OR YOUR FAMILY DOWN: SEVERAL DAYS
SUM OF ALL RESPONSES TO PHQ QUESTIONS 1-9: 8
SUM OF ALL RESPONSES TO PHQ QUESTIONS 1-9: 8
5. POOR APPETITE OR OVEREATING: NOT AT ALL
2. FEELING DOWN, DEPRESSED OR HOPELESS: SEVERAL DAYS
1. LITTLE INTEREST OR PLEASURE IN DOING THINGS: SEVERAL DAYS
8. MOVING OR SPEAKING SO SLOWLY THAT OTHER PEOPLE COULD HAVE NOTICED. OR THE OPPOSITE, BEING SO FIGETY OR RESTLESS THAT YOU HAVE BEEN MOVING AROUND A LOT MORE THAN USUAL: NOT AT ALL
SUM OF ALL RESPONSES TO PHQ QUESTIONS 1-9: 8
3. TROUBLE FALLING OR STAYING ASLEEP: NOT AT ALL

## 2025-04-07 ASSESSMENT — ANXIETY QUESTIONNAIRES
GAD7 TOTAL SCORE: 7
1. FEELING NERVOUS, ANXIOUS, OR ON EDGE: MORE THAN HALF THE DAYS
5. BEING SO RESTLESS THAT IT IS HARD TO SIT STILL: NOT AT ALL
7. FEELING AFRAID AS IF SOMETHING AWFUL MIGHT HAPPEN: MORE THAN HALF THE DAYS
IF YOU CHECKED OFF ANY PROBLEMS ON THIS QUESTIONNAIRE, HOW DIFFICULT HAVE THESE PROBLEMS MADE IT FOR YOU TO DO YOUR WORK, TAKE CARE OF THINGS AT HOME, OR GET ALONG WITH OTHER PEOPLE: VERY DIFFICULT
3. WORRYING TOO MUCH ABOUT DIFFERENT THINGS: NOT AT ALL
6. BECOMING EASILY ANNOYED OR IRRITABLE: MORE THAN HALF THE DAYS
2. NOT BEING ABLE TO STOP OR CONTROL WORRYING: NOT AT ALL
4. TROUBLE RELAXING: SEVERAL DAYS

## 2025-04-07 NOTE — PROGRESS NOTES
\"Have you been to the ER, urgent care clinic since your last visit?  Hospitalized since your last visit?\"    NO    “Have you seen or consulted any other health care providers outside our system since your last visit?”    YES - When: approximately 1 months ago.  Where and Why: Dr Patel . Notes in chart    “Have you had a diabetic eye exam?”    NO     No diabetic eye exam on file            Chief Complaint   Patient presents with    Diabetes     Saw cardiologist dr trevizo last month, notes in chart

## 2025-04-10 ENCOUNTER — TELEPHONE (OUTPATIENT)
Age: 55
End: 2025-04-10

## 2025-04-10 NOTE — TELEPHONE ENCOUNTER
Spoke with [employer] regarding an updated download for his upcoming visit scheduled on 4/22/25. Mr. Oliver has not received his PAP device yet. He scheduled on 04/21/25 to pickup his machine.

## 2025-06-03 ASSESSMENT — SLEEP AND FATIGUE QUESTIONNAIRES
HOW LIKELY ARE YOU TO NOD OFF OR FALL ASLEEP WHILE SITTING QUIETLY AFTER LUNCH WITHOUT ALCOHOL: SLIGHT CHANCE OF DOZING
DO YOU HAVE DIFFICULTY VISITING YOUR FAMILY OR FRIENDS IN THEIR HOME BECAUSE YOU BECOME SLEEPY OR TIRED: NO
DO YOU HAVE DIFFICULTY BEING AS ACTIVE AS YOU WANT TO BE IN THE EVENING BECAUSE YOU ARE SLEEPY OR TIRED: YES, MODERATE
DO YOU HAVE DIFFICULTY OPERATING A MOTOR VEHICLE FOR LONG DISTANCES (GREATER THAN 100 MILES) BECAUSE YOU BECOME SLEEPY: YES, EXTREME
ESS TOTAL SCORE: 13
DO YOU HAVE DIFFICULTY WATCHING A MOVIE OR VIDEO BECAUSE YOU BECOME SLEEPY OR TIRED: YES, MODERATE
HAS YOUR RELATIONSHIP WITH FAMILY, FRIENDS OR WORK COLLEAGUES BEEN AFFECTED BECAUSE YOU ARE SLEEPY OR TIRED: YES, A LITTLE
HOW LIKELY ARE YOU TO NOD OFF OR FALL ASLEEP WHILE LYING DOWN TO REST IN THE AFTERNOON WHEN CIRCUMSTANCES PERMIT: SLIGHT CHANCE OF DOZING
DO YOU HAVE DIFFICULTY CONCENTRATING ON THE THINGS YOU DO BECAUSE YOU ARE SLEEPY OR TIRED: YES, A LITTLE
HOW LIKELY ARE YOU TO NOD OFF OR FALL ASLEEP WHILE SITTING AND READING: MODERATE CHANCE OF DOZING
HOW LIKELY ARE YOU TO NOD OFF OR FALL ASLEEP WHILE SITTING AND TALKING TO SOMEONE: SLIGHT CHANCE OF DOZING
HOW LIKELY ARE YOU TO NOD OFF OR FALL ASLEEP WHILE SITTING AND READING: MODERATE CHANCE OF DOZING
HOW LIKELY ARE YOU TO NOD OFF OR FALL ASLEEP WHEN YOU ARE A PASSENGER IN A CAR FOR AN HOUR WITHOUT A BREAK: HIGH CHANCE OF DOZING
HOW LIKELY ARE YOU TO NOD OFF OR FALL ASLEEP WHEN YOU ARE A PASSENGER IN A CAR FOR AN HOUR WITHOUT A BREAK: HIGH CHANCE OF DOZING
DO YOU GENERALLY HAVE DIFFICULTY REMEMBERING THINGS BECAUSE YOU ARE SLEEPY OR TIRED: YES, A LITTLE
HOW LIKELY ARE YOU TO NOD OFF OR FALL ASLEEP WHILE SITTING INACTIVE IN A PUBLIC PLACE: HIGH CHANCE OF DOZING
HOW LIKELY ARE YOU TO NOD OFF OR FALL ASLEEP WHILE SITTING QUIETLY AFTER LUNCH WITHOUT ALCOHOL: SLIGHT CHANCE OF DOZING
HOW LIKELY ARE YOU TO NOD OFF OR FALL ASLEEP IN A CAR, WHILE STOPPED FOR A FEW MINUTES IN TRAFFIC: SLIGHT CHANCE OF DOZING
HOW LIKELY ARE YOU TO NOD OFF OR FALL ASLEEP IN A CAR, WHILE STOPPED FOR A FEW MINUTES IN TRAFFIC: SLIGHT CHANCE OF DOZING
HOW LIKELY ARE YOU TO NOD OFF OR FALL ASLEEP WHILE LYING DOWN TO REST IN THE AFTERNOON WHEN CIRCUMSTANCES PERMIT: SLIGHT CHANCE OF DOZING
DO YOU HAVE DIFFICULTY OPERATING A MOTOR VEHICLE FOR SHORT DISTANCES (LESS THAN 100 MILES) BECAUSE YOU BECOME SLEEPY: YES, EXTREME
HOW LIKELY ARE YOU TO NOD OFF OR FALL ASLEEP WHILE SITTING AND TALKING TO SOMEONE: SLIGHT CHANCE OF DOZING
HAS YOUR MOOD BEEN AFFECTED BECAUSE YOU ARE SLEEPY OR TIRED: YES, MODERATE
FOSQ SCORE: 11
HOW LIKELY ARE YOU TO NOD OFF OR FALL ASLEEP WHILE WATCHING TV: SLIGHT CHANCE OF DOZING
HOW LIKELY ARE YOU TO NOD OFF OR FALL ASLEEP WHILE WATCHING TV: SLIGHT CHANCE OF DOZING
DO YOU HAVE DIFFICULTY BEING AS ACTIVE AS YOU WANT TO BE IN THE MORNING BECAUSE YOU ARE SLEEPY OR TIRED: YES, EXTREME
HOW LIKELY ARE YOU TO NOD OFF OR FALL ASLEEP WHILE SITTING INACTIVE IN A PUBLIC PLACE: HIGH CHANCE OF DOZING

## 2025-06-04 ENCOUNTER — CLINICAL DOCUMENTATION (OUTPATIENT)
Age: 55
End: 2025-06-04

## 2025-06-04 ENCOUNTER — TELEMEDICINE (OUTPATIENT)
Age: 55
End: 2025-06-04
Payer: MEDICAID

## 2025-06-04 DIAGNOSIS — I10 ESSENTIAL HYPERTENSION: ICD-10-CM

## 2025-06-04 DIAGNOSIS — G47.33 OBSTRUCTIVE SLEEP APNEA (ADULT) (PEDIATRIC): Primary | ICD-10-CM

## 2025-06-04 PROCEDURE — 99214 OFFICE O/P EST MOD 30 MIN: CPT | Performed by: NURSE PRACTITIONER

## 2025-06-04 NOTE — PROGRESS NOTES
Marco Oliver, was evaluated through a synchronous (real-time) audio-video encounter. The patient (or guardian if applicable) is aware that this is a billable service, which includes applicable co-pays. This Virtual Visit was conducted with patient's (and/or legal guardian's) consent. Patient identification was verified, and a caregiver was present when appropriate.   The patient was located at Home: [facility] [address]  [facility] [address]  Provider was located at Facility (Appt Dept): [address] [address] [facility] [address]  [place] [address]  Confirm you are appropriately licensed, registered, or certified to deliver care in the state where the patient is located as indicated above. If you are not or unsure, please re-schedule the visit: Yes, I confirm.      Total time spent for this encounter: Not billed by time    --VELVET Shankar on 6/4/2025 at 1:51 PM    An electronic signature was used to authenticate this note.'                    [address]  [place] [address]  Tel.  [phone]  Fax. [phone] [address] [place] [address]  Tel.  [phone]  Fax. [phone] [address] [place] [address]  Tel.  [phone]  Fax. [phone]       Telemedicine visit performed with verbal consent of the patient.  Patient called and identity confirmed with 2 patient identifers          S>Marco Oliver is a 54 y.o. male seen at this telemedicine visit for a positive airway pressure follow-up. He was last seen 8/2024 by Dr Monet. The home  sleep test 11/2-24 was positive for significant apnea.  AHI was 12/hour.  Mild oxygen desaturations and significant snoring. He goes to bed 8:30 pm and rises at 5-6 am. Sleep is much more restorative on PAP device. Pre PAP,  he woke himself up snoring multiple times throughout the night and this has stopped.     He  states he is on disability for depression, arthritis and gout.      He reports some problems using the device- the full face mask (nasal

## 2025-06-04 NOTE — PATIENT INSTRUCTIONS
[address]  [place] [address]  Tel.  [phone]  Fax. [phone] [address]  [place] [address]  Tel.  [phone]  Fax. [phone] [address] [place] [address]  Tel.  [phone]  Fax. [phone]     Learning About CPAP for Sleep Apnea  What is CPAP?              CPAP is a small machine that you use at home every night while you sleep. It increases air pressure in your throat to keep your airway open. When you have sleep apnea, this can help you sleep better so you feel much better. CPAP stands for \"continuous positive airway pressure.\"  The CPAP machine will have one of the following:  A mask that covers your nose and mouth  Prongs that fit into your nose  A mask that covers your nose only, the most common type. This type is called NCPAP. The N stands for \"nasal.\"  Why is it done?  CPAP is usually the best treatment for obstructive sleep apnea. It is the first treatment choice and the most widely used. Your doctor may suggest CPAP if you have:  Moderate to severe sleep apnea.  Sleep apnea and coronary artery disease (CAD) or heart failure.  How does it help?  CPAP can help you have more normal sleep, so you feel less sleepy and more alert during the daytime.  CPAP may help keep heart failure or other heart problems from getting worse.  NCPAP may help lower your blood pressure.  If you use CPAP, your bed partner may also sleep better because you are not snoring or restless.  What are the side effects?  Some people who use CPAP have:  A dry or stuffy nose and a sore throat.  Irritated skin on the face.  Sore eyes.  Bloating.  If you have any of these problems, work with your doctor to fix them. Here are some things you can try:  Be sure the mask or nasal prongs fit well.  See if your doctor can adjust the pressure of your CPAP.  If your nose is dry, try a humidifier.  If your nose is runny or stuffy, try decongestant medicine or a steroid

## 2025-06-09 LAB
HBA1C MFR BLD HPLC: 7.1 %
LDL CHOLESTEROL, EXTERNAL: 105

## 2025-06-10 ENCOUNTER — OFFICE VISIT (OUTPATIENT)
Facility: CLINIC | Age: 55
End: 2025-06-10
Payer: MEDICAID

## 2025-06-10 VITALS
HEIGHT: 71 IN | SYSTOLIC BLOOD PRESSURE: 135 MMHG | DIASTOLIC BLOOD PRESSURE: 86 MMHG | BODY MASS INDEX: 32.65 KG/M2 | OXYGEN SATURATION: 98 % | HEART RATE: 100 BPM | TEMPERATURE: 97.9 F | WEIGHT: 233.2 LBS | RESPIRATION RATE: 16 BRPM

## 2025-06-10 DIAGNOSIS — Z01.818 PREOPERATIVE EXAMINATION: Primary | ICD-10-CM

## 2025-06-10 DIAGNOSIS — I10 ESSENTIAL HYPERTENSION: ICD-10-CM

## 2025-06-10 DIAGNOSIS — M1A.0710 IDIOPATHIC CHRONIC GOUT OF RIGHT FOOT WITHOUT TOPHUS: ICD-10-CM

## 2025-06-10 DIAGNOSIS — F32.1 MODERATE MAJOR DEPRESSION (HCC): ICD-10-CM

## 2025-06-10 DIAGNOSIS — E11.65 TYPE 2 DIABETES MELLITUS WITH HYPERGLYCEMIA, WITHOUT LONG-TERM CURRENT USE OF INSULIN (HCC): ICD-10-CM

## 2025-06-10 DIAGNOSIS — N62 GYNECOMASTIA, MALE: ICD-10-CM

## 2025-06-10 PROCEDURE — 99214 OFFICE O/P EST MOD 30 MIN: CPT | Performed by: FAMILY MEDICINE

## 2025-06-10 PROCEDURE — 3051F HG A1C>EQUAL 7.0%<8.0%: CPT | Performed by: FAMILY MEDICINE

## 2025-06-10 PROCEDURE — 3075F SYST BP GE 130 - 139MM HG: CPT | Performed by: FAMILY MEDICINE

## 2025-06-10 PROCEDURE — 3079F DIAST BP 80-89 MM HG: CPT | Performed by: FAMILY MEDICINE

## 2025-06-10 SDOH — ECONOMIC STABILITY: FOOD INSECURITY: WITHIN THE PAST 12 MONTHS, THE FOOD YOU BOUGHT JUST DIDN'T LAST AND YOU DIDN'T HAVE MONEY TO GET MORE.: NEVER TRUE

## 2025-06-10 SDOH — ECONOMIC STABILITY: FOOD INSECURITY: WITHIN THE PAST 12 MONTHS, YOU WORRIED THAT YOUR FOOD WOULD RUN OUT BEFORE YOU GOT MONEY TO BUY MORE.: NEVER TRUE

## 2025-06-10 ASSESSMENT — PATIENT HEALTH QUESTIONNAIRE - PHQ9
1. LITTLE INTEREST OR PLEASURE IN DOING THINGS: NOT AT ALL
9. THOUGHTS THAT YOU WOULD BE BETTER OFF DEAD, OR OF HURTING YOURSELF: NOT AT ALL
4. FEELING TIRED OR HAVING LITTLE ENERGY: NOT AT ALL
SUM OF ALL RESPONSES TO PHQ QUESTIONS 1-9: 0
5. POOR APPETITE OR OVEREATING: NOT AT ALL
2. FEELING DOWN, DEPRESSED OR HOPELESS: NOT AT ALL
SUM OF ALL RESPONSES TO PHQ QUESTIONS 1-9: 0
3. TROUBLE FALLING OR STAYING ASLEEP: NOT AT ALL
SUM OF ALL RESPONSES TO PHQ QUESTIONS 1-9: 0
7. TROUBLE CONCENTRATING ON THINGS, SUCH AS READING THE NEWSPAPER OR WATCHING TELEVISION: NOT AT ALL
8. MOVING OR SPEAKING SO SLOWLY THAT OTHER PEOPLE COULD HAVE NOTICED. OR THE OPPOSITE, BEING SO FIGETY OR RESTLESS THAT YOU HAVE BEEN MOVING AROUND A LOT MORE THAN USUAL: NOT AT ALL
SUM OF ALL RESPONSES TO PHQ QUESTIONS 1-9: 0
10. IF YOU CHECKED OFF ANY PROBLEMS, HOW DIFFICULT HAVE THESE PROBLEMS MADE IT FOR YOU TO DO YOUR WORK, TAKE CARE OF THINGS AT HOME, OR GET ALONG WITH OTHER PEOPLE: NOT DIFFICULT AT ALL
6. FEELING BAD ABOUT YOURSELF - OR THAT YOU ARE A FAILURE OR HAVE LET YOURSELF OR YOUR FAMILY DOWN: NOT AT ALL

## 2025-06-10 NOTE — PROGRESS NOTES
Subjective:      Marco Oliver is a 54 y.o. male who presents to the office today for a preoperative consultation at the request of surgeon Gonzalo who plans on performing bunion trim on July 11.  This consultation is requested for the specific conditions prompting preoperative evaluation (i.e. because of potential affect on operative risk): MI CVA.  Planned anesthesia is [facility].  The patient has the following known anesthesia issues:  none   Patient has a bleeding risk of : no recent abnormal bleeding, no remote history of abnormal bleeding  Patient does not have objection to receiving blood products if needed.    Continues to complain of breast pain breast enlargement after being prescribed spironolactone.  Attempts to shrink his breasts with tamoxifen have been unsuccessful.    Past Medical History:   Diagnosis Date    Depression     Hypercholesterolemia     Hyperglycemia due to type 2 diabetes mellitus (HCC) 8/25/2021    Hypertension     Psychotic disorder (HCC)     Rash     Sleep apnea      Patient Active Problem List    Diagnosis Date Noted    Hx of cardiac cath 06/01/2023    Abnormal cardiovascular stress test 11/03/2022    Type 2 diabetes mellitus with hyperglycemia, without long-term current use of insulin (HCC) 08/25/2021    Fatty liver 01/26/2021    Helicobacter pylori infection 04/15/2020    Peripheral vascular disease 01/03/2020    Essential hypertension 10/01/2019    Moderate major depression (HCC) 10/01/2019    Hypercholesteremia 10/01/2019    Eczema 10/01/2019    Idiopathic chronic gout of right foot without tophus 10/01/2019     Past Surgical History:   Procedure Laterality Date    CARDIAC CATHETERIZATION  12/20/2022    No CAD    CT BIOPSY SOFT TISSUE NECK  5/19/2020    CT BIOPSY SOFT TISSUE NECK [facility] HISTORICAL     Family History   Problem Relation Age of Onset    Stroke Maternal Aunt     Migraines Sister     Cancer Mother     Stroke Maternal Grandmother      Social History     Socioeconomic

## 2025-06-10 NOTE — PROGRESS NOTES
Have you been to the ER, urgent care clinic since your last visit?  Hospitalized since your last visit?   NO    Have you seen or consulted any other health care providers outside our system since your last visit?   NO    “Have you had a diabetic eye exam?”    NO     No diabetic eye exam on file            Chief Complaint   Patient presents with    Pre-op Exam     Foot surgery. L foot

## 2025-06-20 ENCOUNTER — OFFICE VISIT (OUTPATIENT)
Age: 55
End: 2025-06-20
Payer: MEDICAID

## 2025-06-20 VITALS
TEMPERATURE: 98.7 F | BODY MASS INDEX: 32.62 KG/M2 | OXYGEN SATURATION: 98 % | HEART RATE: 90 BPM | SYSTOLIC BLOOD PRESSURE: 139 MMHG | HEIGHT: 71 IN | WEIGHT: 233 LBS | DIASTOLIC BLOOD PRESSURE: 86 MMHG | RESPIRATION RATE: 16 BRPM

## 2025-06-20 DIAGNOSIS — N62 GYNECOMASTIA: Primary | ICD-10-CM

## 2025-06-20 PROCEDURE — 99203 OFFICE O/P NEW LOW 30 MIN: CPT | Performed by: SURGERY

## 2025-06-20 PROCEDURE — 3079F DIAST BP 80-89 MM HG: CPT | Performed by: SURGERY

## 2025-06-20 PROCEDURE — 3075F SYST BP GE 130 - 139MM HG: CPT | Performed by: SURGERY

## 2025-06-20 RX ORDER — PNV NO.95/FERROUS FUM/FOLIC AC 28MG-0.8MG
1 TABLET ORAL DAILY
COMMUNITY
Start: 2025-05-23

## 2025-06-20 RX ORDER — AMLODIPINE AND VALSARTAN 10; 320 MG/1; MG/1
1 TABLET ORAL DAILY
COMMUNITY
Start: 2025-05-22

## 2025-06-20 ASSESSMENT — ENCOUNTER SYMPTOMS
SHORTNESS OF BREATH: 0
WHEEZING: 0
BLOOD IN STOOL: 0
EYE PAIN: 0
CONSTIPATION: 0
ABDOMINAL PAIN: 0
BACK PAIN: 0
DIARRHEA: 0
APNEA: 1
COUGH: 0
SORE THROAT: 0
STRIDOR: 0
NAUSEA: 0
VOMITING: 0

## 2025-06-20 NOTE — PROGRESS NOTES
Identified pt with two pt identifiers (name and ). Reviewed chart in preparation for visit and have obtained necessary documentation.    Marco Oliver is a 54 y.o. male  Chief Complaint   Patient presents with    New Patient     NP, gynescomastia/breast pain, trang [place], notes in , [employer], arriving early for pw     /86 (BP Site: Left Upper Arm, Patient Position: Sitting, BP Cuff Size: Large Adult) Comment: patient reports not taking his meds this morning  Pulse 90   Temp 98.7 °F (37.1 °C) (Oral)   Resp 16   Ht 1.803 m (5' 11\")   Wt 105.7 kg (233 lb)   SpO2 98%   BMI 32.50 kg/m²     1. Have you been to the ER, urgent care clinic since your last visit?  Hospitalized since your last visit?no    2. Have you seen or consulted any other health care providers outside of the [facility] since your last visit?  Include any pap smears or colon screening. no  
diaphoresis, fatigue, fever and unexpected weight change.   HENT:  Negative for congestion, ear pain and sore throat.    Eyes:  Negative for pain.   Respiratory:  Positive for apnea. Negative for cough, shortness of breath, wheezing and stridor.    Cardiovascular:  Negative for chest pain, palpitations and leg swelling.   Gastrointestinal:  Negative for abdominal pain, blood in stool, constipation, diarrhea, nausea and vomiting.        Indigestion   Endocrine: Negative for polydipsia.   Genitourinary:  Negative for dysuria, flank pain, frequency, hematuria and urgency.   Musculoskeletal:  Positive for arthralgias. Negative for back pain, gait problem and myalgias.   Neurological:  Negative for dizziness, seizures, weakness, numbness and headaches.   Psychiatric/Behavioral:  Positive for dysphoric mood. Negative for behavioral problems. The patient is not nervous/anxious.          /86 (BP Site: Left Upper Arm, Patient Position: Sitting, BP Cuff Size: Large Adult) Comment: patient reports not taking his meds this morning  Pulse 90   Temp 98.7 °F (37.1 °C) (Oral)   Resp 16   Ht 1.803 m (5' 11\")   Wt 105.7 kg (233 lb)   SpO2 98%   BMI 32.50 kg/m²     Objective:   Physical Exam  Vitals and nursing note reviewed.   Constitutional:       General: He is not in acute distress.     Appearance: Normal appearance. He is well-developed. He is not ill-appearing or diaphoretic.   HENT:      Head: Normocephalic and atraumatic.   Eyes:      General: No scleral icterus.     Extraocular Movements: Extraocular movements intact.      Conjunctiva/sclera: Conjunctivae normal.      Pupils: Pupils are equal, round, and reactive to light.   Neck:      Thyroid: No thyroid mass or thyromegaly.      Trachea: Trachea and phonation normal. No tracheal deviation.   Cardiovascular:      Rate and Rhythm: Normal rate and regular rhythm.      Heart sounds: Normal heart sounds. No murmur heard.     No friction rub. No gallop.   Pulmonary:

## 2025-07-10 DIAGNOSIS — I10 ESSENTIAL HYPERTENSION: ICD-10-CM

## 2025-07-10 RX ORDER — POTASSIUM CHLORIDE 750 MG/1
10 TABLET, EXTENDED RELEASE ORAL DAILY
Qty: 90 TABLET | Refills: 0 | OUTPATIENT
Start: 2025-07-10

## 2025-07-30 DIAGNOSIS — M10.279 DRUG-INDUCED GOUT OF FOOT, UNSPECIFIED CHRONICITY, UNSPECIFIED LATERALITY: ICD-10-CM

## 2025-08-07 DIAGNOSIS — M10.279 DRUG-INDUCED GOUT OF FOOT, UNSPECIFIED CHRONICITY, UNSPECIFIED LATERALITY: ICD-10-CM

## 2025-08-07 RX ORDER — PREDNISONE 20 MG/1
20 TABLET ORAL DAILY
Qty: 10 TABLET | Refills: 1 | OUTPATIENT
Start: 2025-08-07

## 2025-08-07 RX ORDER — PREDNISONE 20 MG/1
20 TABLET ORAL DAILY
Qty: 10 TABLET | Refills: 0 | Status: SHIPPED | OUTPATIENT
Start: 2025-08-07

## 2025-08-07 RX ORDER — PREDNISONE 20 MG/1
TABLET ORAL
Qty: 10 TABLET | Refills: 0 | OUTPATIENT
Start: 2025-08-07

## 2025-08-21 ENCOUNTER — OFFICE VISIT (OUTPATIENT)
Facility: CLINIC | Age: 55
End: 2025-08-21
Payer: MEDICAID

## 2025-08-21 VITALS
BODY MASS INDEX: 32.94 KG/M2 | DIASTOLIC BLOOD PRESSURE: 89 MMHG | SYSTOLIC BLOOD PRESSURE: 138 MMHG | HEART RATE: 79 BPM | WEIGHT: 235.3 LBS | RESPIRATION RATE: 14 BRPM | HEIGHT: 71 IN | OXYGEN SATURATION: 98 % | TEMPERATURE: 98.2 F

## 2025-08-21 DIAGNOSIS — I10 ESSENTIAL HYPERTENSION: ICD-10-CM

## 2025-08-21 DIAGNOSIS — E78.00 HYPERCHOLESTEREMIA: ICD-10-CM

## 2025-08-21 DIAGNOSIS — F32.1 MODERATE MAJOR DEPRESSION (HCC): ICD-10-CM

## 2025-08-21 DIAGNOSIS — I73.9 PERIPHERAL VASCULAR DISEASE: ICD-10-CM

## 2025-08-21 DIAGNOSIS — R10.30 LOWER ABDOMINAL PAIN: ICD-10-CM

## 2025-08-21 DIAGNOSIS — E11.65 TYPE 2 DIABETES MELLITUS WITH HYPERGLYCEMIA, WITHOUT LONG-TERM CURRENT USE OF INSULIN (HCC): Primary | ICD-10-CM

## 2025-08-21 LAB
BILIRUBIN, URINE, POC: NEGATIVE
BLOOD URINE, POC: NEGATIVE
GLUCOSE URINE, POC: NEGATIVE
KETONES, URINE, POC: NEGATIVE
LEUKOCYTE ESTERASE, URINE, POC: NEGATIVE
NITRITE, URINE, POC: NEGATIVE
PH, URINE, POC: 6 (ref 4.6–8)
PROTEIN,URINE, POC: NEGATIVE
SPECIFIC GRAVITY, URINE, POC: 1.02 (ref 1–1.03)
URINALYSIS CLARITY, POC: CLEAR
URINALYSIS COLOR, POC: YELLOW
UROBILINOGEN, POC: NORMAL

## 2025-08-21 PROCEDURE — 3075F SYST BP GE 130 - 139MM HG: CPT | Performed by: FAMILY MEDICINE

## 2025-08-21 PROCEDURE — 81003 URINALYSIS AUTO W/O SCOPE: CPT | Performed by: FAMILY MEDICINE

## 2025-08-21 PROCEDURE — 3079F DIAST BP 80-89 MM HG: CPT | Performed by: FAMILY MEDICINE

## 2025-08-21 PROCEDURE — 99214 OFFICE O/P EST MOD 30 MIN: CPT | Performed by: FAMILY MEDICINE

## 2025-08-21 PROCEDURE — 3051F HG A1C>EQUAL 7.0%<8.0%: CPT | Performed by: FAMILY MEDICINE

## 2025-08-21 RX ORDER — ESCITALOPRAM OXALATE 10 MG/1
10 TABLET ORAL DAILY
COMMUNITY
Start: 2025-07-29

## 2025-08-21 RX ORDER — AMLODIPINE AND VALSARTAN 10; 320 MG/1; MG/1
1 TABLET ORAL DAILY
Qty: 100 TABLET | Refills: 1 | Status: SHIPPED | OUTPATIENT
Start: 2025-08-21

## 2025-08-21 ASSESSMENT — PATIENT HEALTH QUESTIONNAIRE - PHQ9
8. MOVING OR SPEAKING SO SLOWLY THAT OTHER PEOPLE COULD HAVE NOTICED. OR THE OPPOSITE, BEING SO FIGETY OR RESTLESS THAT YOU HAVE BEEN MOVING AROUND A LOT MORE THAN USUAL: SEVERAL DAYS
SUM OF ALL RESPONSES TO PHQ QUESTIONS 1-9: 5
SUM OF ALL RESPONSES TO PHQ QUESTIONS 1-9: 5
4. FEELING TIRED OR HAVING LITTLE ENERGY: SEVERAL DAYS
10. IF YOU CHECKED OFF ANY PROBLEMS, HOW DIFFICULT HAVE THESE PROBLEMS MADE IT FOR YOU TO DO YOUR WORK, TAKE CARE OF THINGS AT HOME, OR GET ALONG WITH OTHER PEOPLE: SOMEWHAT DIFFICULT
6. FEELING BAD ABOUT YOURSELF - OR THAT YOU ARE A FAILURE OR HAVE LET YOURSELF OR YOUR FAMILY DOWN: SEVERAL DAYS
2. FEELING DOWN, DEPRESSED OR HOPELESS: SEVERAL DAYS
7. TROUBLE CONCENTRATING ON THINGS, SUCH AS READING THE NEWSPAPER OR WATCHING TELEVISION: SEVERAL DAYS
3. TROUBLE FALLING OR STAYING ASLEEP: NOT AT ALL
9. THOUGHTS THAT YOU WOULD BE BETTER OFF DEAD, OR OF HURTING YOURSELF: NOT AT ALL
SUM OF ALL RESPONSES TO PHQ QUESTIONS 1-9: 5
SUM OF ALL RESPONSES TO PHQ QUESTIONS 1-9: 5
1. LITTLE INTEREST OR PLEASURE IN DOING THINGS: NOT AT ALL
5. POOR APPETITE OR OVEREATING: NOT AT ALL

## 2025-08-21 ASSESSMENT — LIFESTYLE VARIABLES
HOW OFTEN DO YOU HAVE A DRINK CONTAINING ALCOHOL: 2-3 TIMES A WEEK
HOW MANY STANDARD DRINKS CONTAINING ALCOHOL DO YOU HAVE ON A TYPICAL DAY: 1 OR 2